# Patient Record
Sex: MALE | Race: WHITE | NOT HISPANIC OR LATINO | Employment: UNEMPLOYED | URBAN - METROPOLITAN AREA
[De-identification: names, ages, dates, MRNs, and addresses within clinical notes are randomized per-mention and may not be internally consistent; named-entity substitution may affect disease eponyms.]

---

## 2017-01-04 ENCOUNTER — OFFICE VISIT (OUTPATIENT)
Dept: BEHAVIORAL HEALTH | Facility: CLINIC | Age: 32
End: 2017-01-04
Payer: COMMERCIAL

## 2017-01-04 ENCOUNTER — OFFICE VISIT (OUTPATIENT)
Dept: FAMILY MEDICINE | Facility: CLINIC | Age: 32
End: 2017-01-04
Payer: COMMERCIAL

## 2017-01-04 VITALS
SYSTOLIC BLOOD PRESSURE: 138 MMHG | DIASTOLIC BLOOD PRESSURE: 94 MMHG | WEIGHT: 216 LBS | OXYGEN SATURATION: 100 % | TEMPERATURE: 98.5 F | HEART RATE: 78 BPM | BODY MASS INDEX: 26.86 KG/M2 | HEIGHT: 75 IN | RESPIRATION RATE: 20 BRPM

## 2017-01-04 DIAGNOSIS — F51.01 PRIMARY INSOMNIA: ICD-10-CM

## 2017-01-04 DIAGNOSIS — F43.22 ADJUSTMENT DISORDER WITH ANXIOUS MOOD: Primary | ICD-10-CM

## 2017-01-04 DIAGNOSIS — F11.20 UNCOMPLICATED OPIOID DEPENDENCE (H): ICD-10-CM

## 2017-01-04 DIAGNOSIS — K85.20 ALCOHOL-INDUCED ACUTE PANCREATITIS WITHOUT INFECTION OR NECROSIS: Primary | ICD-10-CM

## 2017-01-04 DIAGNOSIS — F33.1 MODERATE EPISODE OF RECURRENT MAJOR DEPRESSIVE DISORDER (H): ICD-10-CM

## 2017-01-04 PROCEDURE — 99215 OFFICE O/P EST HI 40 MIN: CPT | Performed by: FAMILY MEDICINE

## 2017-01-04 PROCEDURE — 90832 PSYTX W PT 30 MINUTES: CPT | Performed by: SOCIAL WORKER

## 2017-01-04 RX ORDER — BUPRENORPHINE AND NALOXONE 8; 2 MG/1; MG/1
2 FILM, SOLUBLE BUCCAL; SUBLINGUAL 2 TIMES DAILY
Qty: 84 FILM | Refills: 0 | Status: SHIPPED | OUTPATIENT
Start: 2017-01-04 | End: 2017-01-16

## 2017-01-04 RX ORDER — MIRTAZAPINE 7.5 MG/1
7.5 TABLET, FILM COATED ORAL AT BEDTIME
Qty: 30 TABLET | Refills: 3 | Status: SHIPPED | OUTPATIENT
Start: 2017-01-04 | End: 2017-08-05

## 2017-01-04 NOTE — PROGRESS NOTES
"  SUBJECTIVE:                                                    Frank Arguello is a 31 year old male who presents to clinic today for the following health issues:    Chronic Pain Follow-Up       Type / Location of Pain: pancreas  Analgesia/pain control:       Recent changes:  Same. Reports he has not had any drinks since the beer he had with his brother prior to last visit.       Overall control: poor  Activity level/function:      Daily activities:  Depends on \"how much I want to hurt,\" states it \"definitely flares up after doing things\"    Work:  Yes,states he is working when he can  Adverse effects:  day to day activity  Adherance    Taking medication as directed?  Yes    Participating in other treatments: had tried PT  Risk Factors:    Sleep:  Poor.      Mood/anxiety:  Worsened, states his anxiety is high. Pt states he has not had any panic attacks since last visit, but has experienced 5 or 6 panic attacks overall. Reports he has not noticed the metoprolol has helped with his anxiety. Pt states he avoids places and people to try to deal with his anxiety. Reports he tries to stay optimistic about the future.     Recent family or social stressors:  none noted    Other aggravating factors: physical activity  PHQ-9 SCORE 11/25/2016   Total Score 21   Some encounter information is confidential and restricted. Go to Review Flowsheets activity to see all data.     No flowsheet data found.  Encounter-Level CSA:     There are no encounter-level csa.             Amount of exercise or physical activity: depends on how patient wants to feel afterwards    Problems taking medications regularly: No    Medication side effects: none    Diet: eating small, healthy meals. Trying to eat 8 small meals a day.    Suboxone followup     Status since last visit:    Since last visit patient has been: struggling. Pt states nothing has improved with the increased dose. Reports same dull, annoying pain that radiates to his back that is " "always there. States activity wise he is able to do things, but experiences flares in pain when being active. Pt states he knows he can live with the chronic pain and is not expecting it to ever go away, however reports that it is affecting his daily life.   Intensity:     There has been: no craving.  N/A    Suboxone Dose: reports current dose is not effective for pain control. Reports he would like to get on a taper if the increased suboxone dose to 32 MG daily is still not helping his pain when pt returns for his next visit in 3 weeks.  Progression of Symptoms:     Cues to use and relapse None    Recovery program has been: active.  Pt states he is still talking to his sponsor and is trying to attend meetings. Pt reports he has sober friends that he can be around. States he is \"just trying to get into a routine\" to be able to deal with his pancreatitis and sobriety.   Accompanying Signs & Symptoms:    Side Effects: none.    Sobriety:   Status: no use since last visit.     Drug Screen: Not done, needs to be completed at next visit.  Precipitating factors:    Triggers have been: non-existent per pt  Alleviating factors:    Contact with sponsor has been: regular. Pt states he talks to a chemical dependency counselor as well.    Family and support system has been: neutral.   Other Therapies Tried :     Patient has been going to recovery meetings: sporadically ([facility] meetings).     Insomnia    Duration: Ongoing    Description (location/character/radiation): States pain is waking him up at night. States he is getting 2-4 hours of sleep a night. Reports the 100 MG seroquel is not helping his sleep very much. Pt reports he was on Remeron in the past, but did not find it helpful. Agrees to try Remeron and Seroquel together.    Intensity:  moderate    Accompanying signs and symptoms: None    History (similar episodes/previous evaluation): Pt has ongoing hx of insomnia associated with alcohol-induced pancreatitis and chronic " "pain    Precipitating or alleviating factors: Chronic pain wakes pt up as above    Therapies tried and outcome: Pt is currently on seroquel, but states it hasn't been effective. Agrees to try seroquel and remeron together    Depression Followup    Status since last visit: Stable. Pt states his depression goes up and down, but doesn't feel he is \"too depressed\". Pt reports he does have the energy and effort to have fun and be active. Pt states his dead brother's birthday was on [place]; reports his mother always takes it hard, but states overall it went fine. Reports recently there have been more days where he has felt down than normal. States sometimes he notices himself isolating more than usual.     See PHQ-9 for current symptoms.  Other associated symptoms: None    Complicating factors:   Significant life event:  Yes-   dead brother's birthday was on [place], as above   Current substance abuse:  None  Anxiety or Panic symptoms:  Yes-  As above    PHQ-9  English PHQ-9   Any Language        Problem list and histories reviewed & adjusted, as indicated.  Additional history: as documented    Patient Active Problem List   Diagnosis     Acute pancreatitis     Alcohol dependence (H)     Hepatic steatosis     Chronic abdominal pain     Opioid dependence s/p methadone treatment     Abnormal transaminases     Pancreatitis     Alcohol intoxication in active alcoholic (H)     Opioid use disorder, severe, dependence (H)     Opioid dependence with withdrawal (H)     Severe alcohol use disorder (H)     Drug abuse     Alcohol withdrawal (H)     Other secondary hypertension     Adjustment disorder with anxious mood     Past Surgical History   Procedure Laterality Date     Endoscopic retrograde cholangiopancreatogram  4/9/2014     Procedure: ENDOSCOPIC RETROGRADE CHOLANGIOPANCREATOGRAM;   Endoscopic Ultrasound with Fine needle aspiration, EsophagoGastroDuodenoscopy with stent placement into pseudeocyst, balloon dilation.  ;  " Surgeon: Jagjit Dow MD;  Location: [facility] OR     Esophagoscopy, gastroscopy, duodenoscopy (egd), combined  2014     Procedure: COMBINED ENDOSCOPIC ULTRASOUND, ESOPHAGOSCOPY, GASTROSCOPY, DUODENOSCOPY (EGD), FINE NEEDLE ASPIRATE/BIOPSY;;  Surgeon: Jagjit Dow MD;  Location: [facility] OR     Esophagoscopy, gastroscopy, duodenoscopy (egd), combined  2014     Procedure: COMBINED ESOPHAGOSCOPY, GASTROSCOPY, DUODENOSCOPY (EGD);  Surgeon: Jagjit Dow MD;  Location: [facility] OR       Social History   Substance Use Topics     Smoking status: Current Every Day Smoker -- 1.00 packs/day     Types: Cigarettes     Smokeless tobacco: Never Used     Alcohol Use: No      Comment: Used to but quit     Family History   Problem Relation Age of Onset     Alcohol/Drug Paternal Grandfather      Alcohol/Drug Father      Substance Abuse Father      Hypertension Mother      DIABETES No family hx of      CANCER No family hx of      Unknown/Adopted No family hx of      Depression No family hx of      Anxiety Disorder No family hx of      Schizophrenia No family hx of      Bipolar Disorder No family hx of      Suicide No family hx of      Dementia No family hx of      Keon Disease No family hx of      Parkinsonism No family hx of      Autism Spectrum Disorder No family hx of      Intellectual Disability (Mental Retardation) No family hx of      MENTAL ILLNESS No family hx of      Other - See Comments Brother       of heroin overdose     Substance Abuse Brother          Current Outpatient Prescriptions   Medication Sig Dispense Refill     buprenorphine HCl-naloxone HCl (SUBOXONE) 8-2 MG per film Place 1 Film under the tongue 3 times daily 42 Film 0     QUEtiapine (SEROQUEL) 50 MG tablet Take 1-2 tablets ( mg) by mouth nightly as needed 30 tablet 1     metoprolol (TOPROL-XL) 50 MG 24 hr tablet Take 1 tablet (50 mg) by mouth daily 90 tablet 3     gabapentin (NEURONTIN) 300 MG capsule Take 3 capsules (900 mg) by  "mouth 3 times daily 270 capsule 0     folic acid (FOLVITE) 1 MG tablet Take 1 tablet (1 mg) by mouth daily 30 tablet 11     busPIRone (BUSPAR) 15 MG tablet Take 15 mg by mouth 3 times daily       multivitamin, therapeutic with minerals (THERA-VIT-M) TABS Take 1 tablet by mouth daily 30 each 0     naloxone (NARCAN) 1 mg/mL for intranasal kit (2 syringes with 2 mucosal atomizer device) Give for opioid overdose.  Insert white cone into nostril and push plunger to spray one-half (1/2) of syringe contents into each nostril. 1 kit 1     Allergies   Allergen Reactions     Trazodone Other (See Comments)     erection     BP Readings from Last 3 Encounters:   01/04/17 154/99   12/21/16 133/86   12/06/16 148/98    Wt Readings from Last 3 Encounters:   01/04/17 97.977 kg (216 lb)   12/21/16 100.245 kg (221 lb)   12/06/16 99.111 kg (218 lb 8 oz)        Labs reviewed in EPIC  Problem list, Medication list, Allergies, and Medical/Social/Surgical histories reviewed in [facility] and updated as appropriate.    ROS:  Constitutional, HEENT, cardiovascular, pulmonary, , musculoskeletal, neuro, skin, and endocrine systems are negative, except as otherwise noted.  GI: Positive for alcohol-induced pancreatitis  Psych: As above.    This document serves as a record of the services and decisions personally performed and made by Amy Starkey MD. It was created on her behalf by Farhat Tejada, a trained medical scribe. The creation of this document is based the provider's statements to the medical scribe.    Arben Tejada 1:15 PM, January 4, 2017    OBJECTIVE:                                                    /99 mmHg  Pulse 78  Temp(Src) 98.5  F (36.9  C) (Oral)  Resp 20  Ht 1.905 m (6' 3\")  Wt 97.977 kg (216 lb)  BMI 27.00 kg/m2  SpO2 100%  Body mass index is 27 kg/(m^2).  GENERAL: Alert and no distress  PSYCH: MENTAL STATUS EXAM   Appearance: neat, well groomed, in casual clothing Attitude: cooperative Behavior: sitting " quietly in chair  Eye Contact: good  Speech: regular rate and volume  Orientation: to person, place, time  Mood: neutral  Affect: Mood congruent  Thought Process: logical, tight .  Pt wishes medical marijuana.  This provider chase check with Samantha KarenpeggyEranjinny to see if she is able to see pt.  He does not wish to be on suboxone long term and wants medical marijuana.  Insight: good  Judgement: good  Suicidal Ideation: none   Hallucination: None   Diagnostic Test Results:  none      ASSESSMENT/PLAN:                                                    (K85.20) Alcohol-induced acute pancreatitis without infection or necrosis  (primary encounter diagnosis)  Comment: Pt states 24 MG suboxone has been inadequate for pain management. Would like to be placed on taper if increased dose to 32 MG is not effective when pt returns to clinic in 3 weeks.  He really is interested in medical marijuana.  Plan: buprenorphine HCl-naloxone HCl (SUBOXONE) 8-2         MG per film        Will update pt on medical marijuana program status.     (F11.20) Uncomplicated opioid dependence (H)  Comment: As above  Plan: buprenorphine HCl-naloxone HCl (SUBOXONE) 8-2         MG per film            (F51.01) Primary insomnia  Comment: Pt states seroquel has not been helpful. Agrees to remeron and seroquel regimen after discussion  Plan: mirtazapine (REMERON) 7.5 MG TABS tablet          F33.1) Moderate major depression recurrent  Plan:  See instructions below.    Patient Instructions   Add mirtazapine (remeron) one at bedtime  Continue seroquel two at bedtime  Increase suboxone to 2 twice a day.  I will check with the medical marijuana provider  Schedule with the pain clinic.  I will coordinate with them.  Recheck in three weeks      The information in this document, created by the medical scribe for me, accurately reflects the services I personally performed and the decisions made by me. I have reviewed and approved this document for accuracy prior to  leaving the patient care area.  1:39 PM, 01/04/2017    Amy Starkey MD, MD  [facility]

## 2017-01-04 NOTE — PATIENT INSTRUCTIONS
Add mirtazapine (remeron) one at bedtime  Continue seroquel two at bedtime  Increase suboxone to 2 twice a day.  I will check with the medical marijuana provider  Schedule with the pain clinic.  I will coordinate with them.  Recheck in three weeks

## 2017-01-04 NOTE — MR AVS SNAPSHOT
After Visit Summary   1/4/2017    Frank Arguello    MRN: [id number]           Patient Information     Date Of Birth          1985        Visit Information        Provider Department      1/4/2017 1:00 PM Amy Starkey MD [facility]        Today's Diagnoses     Psychophysiological insomnia    -  1     Uncomplicated opioid dependence (H)         Alcohol-induced acute pancreatitis without infection or necrosis           Care Instructions    Add mirtazapine (remeron) one at bedtime  Continue seroquel two at bedtime  Increase suboxone to 2 twice a day.  I will check with the medical marijuana provider  Schedule with the pain clinic.  I will coordinate with them.  Recheck in three weeks        Follow-ups after your visit        Who to contact     If you have questions or need follow up information about today's clinic visit or your schedule please contact [facility] directly at [phone].  Normal or non-critical lab and imaging results will be communicated to you by MyChart, letter or phone within 4 business days after the clinic has received the results. If you do not hear from us within 7 days, please contact the clinic through [employer] or phone. If you have a critical or abnormal lab result, we will notify you by phone as soon as possible.  Submit refill requests through [employer] or call your pharmacy and they will forward the refill request to us. Please allow 3 business days for your refill to be completed.          Additional Information About Your Visit        MyChart Information     [employer] gives you secure access to your electronic health record. If you see a primary care provider, you can also send messages to your care team and make appointments. If you have questions, please call your primary care clinic.  If you do not have a primary care provider, please call [phone] and they will assist you.        Care EveryWhere  "ID     This is your Care EveryWhere ID. This could be used by other organizations to access your [facility] medical records  [id number]        Your Vitals Were     Pulse Temperature Respirations Height BMI (Body Mass Index) Pulse Oximetry    78 98.5  F (36.9  C) (Oral) 20 1.905 m (6' 3\") 27.00 kg/m2 100%       Blood Pressure from Last 3 Encounters:   01/04/17 154/99   12/21/16 133/86   12/06/16 148/98    Weight from Last 3 Encounters:   01/04/17 97.977 kg (216 lb)   12/21/16 100.245 kg (221 lb)   12/06/16 99.111 kg (218 lb 8 oz)              Today, you had the following     No orders found for display         Today's Medication Changes          These changes are accurate as of: 1/4/17  1:37 PM.  If you have any questions, ask your nurse or doctor.               Start taking these medicines.        Dose/Directions    mirtazapine 7.5 MG Tabs tablet   Commonly known as:  REMERON   Used for:  Psychophysiological insomnia   Started by:  Amy Starkey MD        Dose:  7.5 mg   Take 1 tablet (7.5 mg) by mouth At Bedtime   Quantity:  30 tablet   Refills:  3         These medicines have changed or have updated prescriptions.        Dose/Directions    buprenorphine HCl-naloxone HCl 8-2 MG per film   Commonly known as:  SUBOXONE   This may have changed:    - how much to take  - when to take this   Used for:  Uncomplicated opioid dependence (H), Alcohol-induced acute pancreatitis without infection or necrosis   Changed by:  Amy Starkey MD        Dose:  2 Film   Place 2 Film under the tongue 2 times daily for 21 days   Quantity:  84 Film   Refills:  0            Where to get your medicines      These medications were sent to [facility] [address] [place] [address] [address]     Phone:  [phone]    - mirtazapine 7.5 MG Tabs tablet      Some of these will need a paper prescription and others can be bought over the counter.  Ask your nurse if you have questions.     Bring " a paper prescription for each of these medications    - buprenorphine HCl-naloxone HCl 8-2 MG per film             Primary Care Provider Office Phone # Fax #    Amy Starkey [phone]       [facility] [address] [facility]  [facility] [address]        Thank you!     Thank you for choosing [facility]  for your care. Our goal is always to provide you with excellent care. Hearing back from our patients is one way we can continue to improve our services. Please take a few minutes to complete the written survey that you may receive in the mail after your visit with us. Thank you!             Your Updated Medication List - Protect others around you: Learn how to safely use, store and throw away your medicines at www.disposemymeds.org.          This list is accurate as of: 1/4/17  1:37 PM.  Always use your most recent med list.                   Brand Name Dispense Instructions for use    buprenorphine HCl-naloxone HCl 8-2 MG per film    SUBOXONE    84 Film    Place 2 Film under the tongue 2 times daily for 21 days       busPIRone 15 MG tablet    BUSPAR     Take 15 mg by mouth 3 times daily       folic acid 1 MG tablet    FOLVITE    30 tablet    Take 1 tablet (1 mg) by mouth daily       gabapentin 300 MG capsule    NEURONTIN    270 capsule    Take 3 capsules (900 mg) by mouth 3 times daily       metoprolol 50 MG 24 hr tablet    TOPROL-XL    90 tablet    Take 1 tablet (50 mg) by mouth daily       mirtazapine 7.5 MG Tabs tablet    REMERON    30 tablet    Take 1 tablet (7.5 mg) by mouth At Bedtime       multivitamin, therapeutic with minerals Tabs tablet     30 each    Take 1 tablet by mouth daily       naloxone 1 mg/mL for intranasal kit (2 syringes with 2 mucosal atomizer device)    NARCAN    1 kit    Give for opioid overdose.  Insert white cone into nostril and push plunger to spray one-half (1/2) of syringe contents into each nostril.        QUEtiapine 50 MG tablet    SEROQUEL    30 tablet    Take 1-2 tablets ( mg) by mouth nightly as needed

## 2017-01-04 NOTE — NURSING NOTE
"Chief Complaint   Patient presents with     Pain       Initial /99 mmHg  Pulse 78  Temp(Src) 98.5  F (36.9  C) (Oral)  Resp 20  Ht 6' 3\" (1.905 m)  Wt 216 lb (97.977 kg)  BMI 27.00 kg/m2  SpO2 100% Estimated body mass index is 27 kg/(m^2) as calculated from the following:    Height as of this encounter: 6' 3\" (1.905 m).    Weight as of this encounter: 216 lb (97.977 kg).  BP completed using cuff size: koby Morrell MA      "

## 2017-01-04 NOTE — NURSING NOTE
Rx was called into [facility] in [facility].    Lalitha Morrell MA

## 2017-01-04 NOTE — Clinical Note
"Luis Enrique Velazquez, I am referring Germain again.  I have reinforced with him the importance of a \"living with pain\" program that the pain clinic can provide.  How do we go about getting him scheduled at this point?"

## 2017-01-06 ENCOUNTER — COMMUNICATION - HEALTHEAST (OUTPATIENT)
Dept: FAMILY MEDICINE | Facility: CLINIC | Age: 32
End: 2017-01-06

## 2017-01-06 DIAGNOSIS — K86.0 ALCOHOL-INDUCED CHRONIC PANCREATITIS (H): ICD-10-CM

## 2017-01-06 NOTE — PROGRESS NOTES
[facility]   January 4, 2017    Behavioral Health Clinician Progress Note    Patient Name: Frank Arguello           Service Type: Individual      Service Location:   Face to Face in Clinic     Session Start Time: 1:14pm  Session End Time: 1:40pm    Session Length: 16 - 37      Attendees: Patient    Visit Activities (Refresh list every visit): [facility] [employer]    Diagnostic Assessment Date: not completed yet  Treatment Plan Review Date: not completed yet  See Flowsheets for today's PHQ-9 and ROCHELLE-7 results  Previous PHQ-9:   PHQ-9 SCORE 11/25/2016   Total Score 21   Some encounter information is confidential and restricted. Go to Review Flowsheets activity to see all data.     Previous ROCHELLE-7: No flowsheet data found.    STEVEN LEVEL:  No flowsheet data found.    DATA  Extended Session (60+ minutes): No  Interactive Complexity: No  Crisis: No    Treatment Objective(s) Addressed in This Session:  Target Behavior(s): disease management/lifestyle changes mental health    Depressed Mood: Increase interest, engagement, and pleasure in doing things  Decrease frequency and intensity of feeling down, depressed, hopeless  Improve quantity and quality of night time sleep / decrease daytime naps  Identify negative self-talk and behaviors: challenge core beliefs, myths, and actions  Improve concentration, focus, and mindfulness in daily activities   Anxiety: will experience a reduction in anxiety, will develop more effective coping skills to manage anxiety symptoms, will develop healthy cognitive patterns and beliefs and will increase ability to function adaptively  Alcohol / Substance Use: continue to make healthy choices regarding substance use and engage in activities / supportive services that promote sobriety  Psychological distress related to Pain    Current Stressors / Issues:  The patient reported that he has been sober since his last use (relapse) when he was drinking with his brother-he has history  of opiate dependence and continues to be sober of use.  He has history of chronic pain in the abdominal area and he expressed interest with the use of medical marijuana to address his pain with his PCP.  Regarding sleep the patient reported having difficulty falling asleep and that he has been waking during the night-he gets about 2 to 4 hours of deep sleep a night-he reported his appetite is okay.  He reported that he has a CD counselor and that he would let the clinic know if he needs more support.  Regarding his mood the patient reported that he has symptoms of depression-he isolate mare that he should-he avoids people, places and things to manage his anxiety-he reported that he has panic attacks-he worries a lot and he has difficulty controlling worrying.  The patient reported that he has sober friends.        Progress on Treatment Objective(s) / Homework:  No improvement - ACTION (Actively working towards change); Intervened by reinforcing change plan / affirming steps taken    Motivational Interviewing    MI Intervention: Expressed Empathy/Understanding, Supported Autonomy, Collaboration, Evocation, Permission to raise concern or advise, Open-ended questions, Reflections: simple and complex and Change talk (evoked)     Change Talk Expressed by the Patient: Desire to change Reasons to change Need to change Committment to change Taking steps    Provider Response to Change Talk: E - Evoked more info from patient about behavior change, A - Affirmed patient's thoughts, decisions, or attempts at behavior change, R - Reflected patient's change talk and S - Summarized patient's change talk statements      Care Plan review completed: No    Medication Review:  No changes to current psychiatric medication(s)    Medication Compliance:  NA    Changes in Health Issues:   Yes: Pain, Associated Psychological Distress    Chemical Use Review:   Substance Use: Chemical use reviewed, no active concerns identified      Tobacco  Use: No change in amount of tobacco use since last session.  Patient declined discussion at this time    Assessment: Current Emotional / Mental Status (status of significant symptoms):  Risk status (Self / Other harm or suicidal ideation)  Patient denies a history of suicidal ideation, suicide attempts, self-injurious behavior, homicidal ideation, homicidal behavior and and other safety concerns  Patient denies current fears or concerns for personal safety.  Patient denies current or recent suicidal ideation or behaviors.  Patient denies current or recent homicidal ideation or behaviors.  Patient denies current or recent self injurious behavior or ideation.  Patient denies other safety concerns.  A safety and risk management plan has not been developed at this time, however patient was encouraged to call [place] [id number] should there be a change in any of these risk factors.    Appearance:   Appropriate   Eye Contact:   Good   Psychomotor Behavior: Normal   Attitude:   Cooperative   Orientation:   All  Speech   Rate / Production: Normal    Volume:  Normal   Mood:    Anxious  Normal  Affect:    Appropriate   Thought Content:  Clear   Thought Form:  Coherent  Goal Directed  Logical   Insight:    Good     Diagnoses:  1. Adjustment disorder with anxious mood        Collateral Reports Completed:  Not Applicable    Plan: (Homework, other):  Patient was given information about behavioral services and encouraged to schedule a follow up appointment with the clinic [facility] as needed.  He was also given information about mental health symptoms and treatment options .  CD Recommendations: Maintain Sobriety.    SARANYA Romero, [facility]      ______________________________________________________________________

## 2017-01-09 ENCOUNTER — COMMUNICATION - HEALTHEAST (OUTPATIENT)
Dept: FAMILY MEDICINE | Facility: CLINIC | Age: 32
End: 2017-01-09

## 2017-01-09 DIAGNOSIS — F10.930 ALCOHOL WITHDRAWAL, UNCOMPLICATED (H): ICD-10-CM

## 2017-01-09 RX ORDER — QUETIAPINE FUMARATE 50 MG/1
50-100 TABLET, FILM COATED ORAL
Qty: 30 TABLET | Refills: 1 | Status: CANCELLED | OUTPATIENT
Start: 2017-01-09

## 2017-01-09 NOTE — TELEPHONE ENCOUNTER
Fax received 01/07/17    QUEtiapine (SEROQUEL) 50 MG     Last Written Prescription Date: 12/06/16  Last Fill Quantity: 30, # refills: 1  Last Office Visit with G, P or [facility] prescribing provider: 01/04/17   Next 5 appointments (look out 90 days)     Jan 25, 2017  2:30 PM   Return Visit with Amy Starkey MD   [facility] ([facility])    [address]   [address]  [facility] [address] [phone]                   BP Readings from Last 3 Encounters:   01/04/17 138/94   12/21/16 133/86   12/06/16 148/98     Pulse Readings from Last 2 Encounters:   01/04/17 78   12/21/16 103     GLC      119   11/9/2016  WBC      8.6   11/9/2016  RBC     4.24   11/9/2016  HGB     12.7   11/9/2016  HCT     38.3   11/9/2016  No components found with this name: mct  MCV       90   11/9/2016  MCH     30.0   11/9/2016  MCHC     33.2   11/9/2016  RDW     13.4   11/9/2016  PLT      189   11/9/2016  CHOL      121   5/30/2013  HDL       46   5/30/2013  LDL       63   5/30/2013  TRIG       63   5/30/2013  CHOLHDLRATIO      2.6   5/30/2013

## 2017-01-09 NOTE — TELEPHONE ENCOUNTER
Routing refill request to provider for review/approval because:  No Associated Dx.          Herbert Montelongo RN

## 2017-01-25 ENCOUNTER — OFFICE VISIT (OUTPATIENT)
Dept: FAMILY MEDICINE | Facility: CLINIC | Age: 32
End: 2017-01-25
Payer: COMMERCIAL

## 2017-01-25 VITALS
OXYGEN SATURATION: 100 % | WEIGHT: 213 LBS | RESPIRATION RATE: 12 BRPM | DIASTOLIC BLOOD PRESSURE: 98 MMHG | SYSTOLIC BLOOD PRESSURE: 134 MMHG | TEMPERATURE: 98.3 F | HEART RATE: 111 BPM | BODY MASS INDEX: 26.62 KG/M2

## 2017-01-25 DIAGNOSIS — F41.9 ANXIETY: ICD-10-CM

## 2017-01-25 DIAGNOSIS — F51.01 PRIMARY INSOMNIA: ICD-10-CM

## 2017-01-25 DIAGNOSIS — K85.20 ALCOHOL-INDUCED ACUTE PANCREATITIS WITHOUT INFECTION OR NECROSIS: ICD-10-CM

## 2017-01-25 DIAGNOSIS — R10.9 CHRONIC ABDOMINAL PAIN: ICD-10-CM

## 2017-01-25 DIAGNOSIS — F11.20 UNCOMPLICATED OPIOID DEPENDENCE (H): Primary | ICD-10-CM

## 2017-01-25 DIAGNOSIS — G89.29 CHRONIC ABDOMINAL PAIN: ICD-10-CM

## 2017-01-25 DIAGNOSIS — F19.20 CHEMICAL DEPENDENCY (H): ICD-10-CM

## 2017-01-25 LAB
AMPHETAMINES UR QL: NORMAL
BARBITURATES UR QL: NORMAL
BENZODIAZ UR QL: NORMAL
BUPRENORPHINE UR QL: POSITIVE
CANNABINOIDS UR QL: NORMAL
COCAINE UR QL: NORMAL
MDA UR QL SCN: NORMAL
METHADONE UR QL SCN: NORMAL
METHAMPHET UR QL SCN: NORMAL
OPIATES UR QL SCN: NORMAL
OXYCODONE UR QL: NORMAL
PCP UR QL SCN: NORMAL
TRICYCLICS UR QL SCN: NORMAL

## 2017-01-25 PROCEDURE — 80306 DRUG TEST PRSMV INSTRMNT: CPT | Performed by: FAMILY MEDICINE

## 2017-01-25 PROCEDURE — 99215 OFFICE O/P EST HI 40 MIN: CPT | Performed by: FAMILY MEDICINE

## 2017-01-25 PROCEDURE — 80305 DRUG TEST PRSMV DIR OPT OBS: CPT | Performed by: FAMILY MEDICINE

## 2017-01-25 RX ORDER — QUETIAPINE FUMARATE 50 MG/1
50-100 TABLET, FILM COATED ORAL
Qty: 30 TABLET | Refills: 1 | Status: SHIPPED | OUTPATIENT
Start: 2017-01-25 | End: 2017-03-05

## 2017-01-25 RX ORDER — GABAPENTIN 300 MG/1
900 CAPSULE ORAL 3 TIMES DAILY
Qty: 270 CAPSULE | Refills: 0 | Status: SHIPPED | OUTPATIENT
Start: 2017-01-25 | End: 2017-07-09

## 2017-01-25 RX ORDER — BUPRENORPHINE AND NALOXONE 8; 2 MG/1; MG/1
FILM, SOLUBLE BUCCAL; SUBLINGUAL
Qty: 75 FILM | Refills: 0 | Status: SHIPPED | OUTPATIENT
Start: 2017-01-25 | End: 2017-02-20

## 2017-01-25 NOTE — Clinical Note
Caroline, I think he is ready to engage in full scale program.  We have discussed it a lot.  Can he just reschedule?

## 2017-01-25 NOTE — MR AVS SNAPSHOT
After Visit Summary   1/25/2017    Frank Arguello    MRN: [id number]           Patient Information     Date Of Birth          1985        Visit Information        Provider Department      1/25/2017 2:30 PM Amy Starkey MD [facility]        Today's Diagnoses     Uncomplicated opioid dependence (H)    -  1     Alcohol-induced acute pancreatitis without infection or necrosis         Chronic abdominal pain         Anxiety         Primary insomnia            Follow-ups after your visit        Who to contact     If you have questions or need follow up information about today's clinic visit or your schedule please contact [facility] directly at [phone].  Normal or non-critical lab and imaging results will be communicated to you by MyChart, letter or phone within 4 business days after the clinic has received the results. If you do not hear from us within 7 days, please contact the clinic through [employer] or phone. If you have a critical or abnormal lab result, we will notify you by phone as soon as possible.  Submit refill requests through [employer] or call your pharmacy and they will forward the refill request to us. Please allow 3 business days for your refill to be completed.          Additional Information About Your Visit        MyChart Information     [employer] gives you secure access to your electronic health record. If you see a primary care provider, you can also send messages to your care team and make appointments. If you have questions, please call your primary care clinic.  If you do not have a primary care provider, please call [phone] and they will assist you.        Care EveryWhere ID     This is your Care EveryWhere ID. This could be used by other organizations to access your [facility] medical records  [id number]        Your Vitals Were     Pulse Temperature Respirations Pulse Oximetry          111 98.3  F (36.8   C) (Oral) 12 100%         Blood Pressure from Last 3 Encounters:   01/25/17 134/98   01/04/17 138/94   12/21/16 133/86    Weight from Last 3 Encounters:   01/25/17 96.616 kg (213 lb)   01/04/17 97.977 kg (216 lb)   12/21/16 100.245 kg (221 lb)              Today, you had the following     No orders found for display         Today's Medication Changes          These changes are accurate as of: 1/25/17  3:12 PM.  If you have any questions, ask your nurse or doctor.               These medicines have changed or have updated prescriptions.        Dose/Directions    buprenorphine HCl-naloxone HCl 8-2 MG per film   Commonly known as:  SUBOXONE   This may have changed:    - how much to take  - how to take this  - when to take this  - additional instructions   Used for:  Uncomplicated opioid dependence (H), Alcohol-induced acute pancreatitis without infection or necrosis   Changed by:  Amy Starkey MD        One in the morning, one in the afternoon, and 1/2 at bedtime   Quantity:  75 Film   Refills:  0            Where to get your medicines      These medications were sent to [place] [address] [place] [address]     Phone:  [phone]    - gabapentin 300 MG capsule  - QUEtiapine 50 MG tablet      Some of these will need a paper prescription and others can be bought over the counter.  Ask your nurse if you have questions.     Bring a paper prescription for each of these medications    - buprenorphine HCl-naloxone HCl 8-2 MG per film             Primary Care Provider Office Phone # Fax #    Amy Starkey [phone]       [facility] [address] [place] [address] [address] [place] [id number]        Thank you!     Thank you for choosing [facility]  for your care. Our goal is always to provide you with excellent care. Hearing back from our patients is one way we can continue to improve our services. Please take  a few minutes to complete the written survey that you may receive in the mail after your visit with us. Thank you!             Your Updated Medication List - Protect others around you: Learn how to safely use, store and throw away your medicines at www.disposemymeds.org.          This list is accurate as of: 1/25/17  3:12 PM.  Always use your most recent med list.                   Brand Name Dispense Instructions for use    buprenorphine HCl-naloxone HCl 8-2 MG per film    SUBOXONE    75 Film    One in the morning, one in the afternoon, and 1/2 at bedtime       busPIRone 15 MG tablet    BUSPAR     Take 15 mg by mouth 3 times daily       folic acid 1 MG tablet    FOLVITE    30 tablet    Take 1 tablet (1 mg) by mouth daily       gabapentin 300 MG capsule    NEURONTIN    270 capsule    Take 3 capsules (900 mg) by mouth 3 times daily       metoprolol 50 MG 24 hr tablet    TOPROL-XL    90 tablet    Take 1 tablet (50 mg) by mouth daily       mirtazapine 7.5 MG Tabs tablet    REMERON    30 tablet    Take 1 tablet (7.5 mg) by mouth At Bedtime       multivitamin, therapeutic with minerals Tabs tablet     30 each    Take 1 tablet by mouth daily       naloxone 1 mg/mL for intranasal kit (2 syringes with 2 mucosal atomizer device)    NARCAN    1 kit    Give for opioid overdose.  Insert white cone into nostril and push plunger to spray one-half (1/2) of syringe contents into each nostril.       omeprazole 20 MG CR capsule    priLOSEC         QUEtiapine 50 MG tablet    SEROQUEL    30 tablet    Take 1-2 tablets ( mg) by mouth nightly as needed

## 2017-01-25 NOTE — PROGRESS NOTES
"  SUBJECTIVE:                                                    Frank Arguello is a 31 year old male who presents to clinic today for the following health issues:    Chronic Pain Follow-Up       Type / Location of Pain: pancreas  Analgesia/pain control:        Recent changes:  Same. Pt reports his insurance no longer covers his gabapentin prescription. Discussed medical marijuana with pt.  Unable to find a certifier within [place], discussed with pt to check with state site.      Overall control: poor  Activity level/function:       Daily activities:  Depends on \"how much I want to hurt,\" states it \"definitely flares up after doing things\"    Work:  Yes, states he is working when he can  Adverse effects:  Constantly and radiates to his back  Adherance    Taking medication as directed?  Yes    Participating in other treatments: had tried PT  Risk Factors:    Sleep:  Poor. Pt states he is taking seroquel and remeron at bed time. Reports he goes to bed 10-11 PM and sometimes is able to fall asleep within 30 minutes, and other times \"tosses and turns\" for 4 hours. Reports he wakes up several times at night and doesn't feel like he is getting any sleep. States he gets anywhere from 45 minutes- 3 hours of sleep at a stretch before awakening, may or may not go back to sleep. Pt takes 100 MG seroquel. States it makes him groggy but doesn't put him to sleep. Reports he tried stopping the seroquel, but states he wasn't able to sleep at all. States he usually wakes up around 7-8 AM. States his amount of sleep isn't related to his activity during the day. He has been making an effort to avoid using a computer or a phone at night to avoid visual stimulation.     Mood/anxiety:  Worsened, states his anxiety is high and \"through the roof.\" Pt states he has been experiencing panic attacks.  Trying deep breathing and other things but doesn't seem to be working. He has not taken his seroquel when he experiences panic attacks. Is " "taking buspar 3 times a day.     Recent family or social stressors:  not really, however his grandma seems to be on her way \"out\"    Other aggravating factors: physical activity        Suboxone followup     Status since last visit:    Since last visit patient has been: struggling. Pt states his insurance denied the higher dose of 32 MG of suboxone. Reports he was experiencing some withdrawal symptoms for 3 days after taking the higher dose then moving back down to the lower 24 MG dose. Pt is requesting tapering suboxone to eventually be completely off of it. Overall pt reports he does not feel a benefit in regards to pain management on the 24 MG.He feels suboxone is another dependency and he would prefer to try another method of pain control.  Pt will look into medical marijuana. Discussed taper schedule with pt.   Pt states he  is interested in going to the pain clinic.   Intensity:     There has been: no craving.      Suboxone Dose: too little.  Pt states insurance denied higher dose of 32 MG.  Progression of Symptoms:     Cues to use and relapse None  Accompanying Signs & Symptoms:    Side Effects: none.    Sobriety:     Status: no use since last visit.    Drug Screen: obtained, no unexpected substances  Precipitating factors:    Triggers have been: non-existent.   Alleviating factors:    Contact with sponsor has been: no sponsor.     Family and support system has been: neutral.   Other Therapies Tried :     Patient has been going to recovery meetings: not at all.      Problem list and histories reviewed & adjusted, as indicated.  Additional history: as documented    Patient Active Problem List   Diagnosis     Acute pancreatitis     Alcohol dependence (H)     Hepatic steatosis     Chronic abdominal pain     Opioid dependence s/p methadone treatment     Abnormal transaminases     Pancreatitis     Alcohol intoxication in active alcoholic (H)     Opioid use disorder, severe, dependence (H)     Opioid dependence with " withdrawal (H)     Severe alcohol use disorder (H)     Drug abuse     Alcohol withdrawal (H)     Other secondary hypertension     Adjustment disorder with anxious mood     Past Surgical History   Procedure Laterality Date     Endoscopic retrograde cholangiopancreatogram  2014     Procedure: ENDOSCOPIC RETROGRADE CHOLANGIOPANCREATOGRAM;   Endoscopic Ultrasound with Fine needle aspiration, EsophagoGastroDuodenoscopy with stent placement into pseudeocyst, balloon dilation.  ;  Surgeon: Jagjit Dow MD;  Location: [facility] OR     Esophagoscopy, gastroscopy, duodenoscopy (egd), combined  2014     Procedure: COMBINED ENDOSCOPIC ULTRASOUND, ESOPHAGOSCOPY, GASTROSCOPY, DUODENOSCOPY (EGD), FINE NEEDLE ASPIRATE/BIOPSY;;  Surgeon: Jagjit Dow MD;  Location: [facility] OR     Esophagoscopy, gastroscopy, duodenoscopy (egd), combined  2014     Procedure: COMBINED ESOPHAGOSCOPY, GASTROSCOPY, DUODENOSCOPY (EGD);  Surgeon: Jagjit Dow MD;  Location: [facility] OR       Social History   Substance Use Topics     Smoking status: Current Every Day Smoker -- 1.00 packs/day     Types: Cigarettes     Smokeless tobacco: Never Used     Alcohol Use: No      Comment: Used to but quit     Family History   Problem Relation Age of Onset     Alcohol/Drug Paternal Grandfather      Alcohol/Drug Father      Substance Abuse Father      Hypertension Mother      DIABETES No family hx of      CANCER No family hx of      Unknown/Adopted No family hx of      Depression No family hx of      Anxiety Disorder No family hx of      Schizophrenia No family hx of      Bipolar Disorder No family hx of      Suicide No family hx of      Dementia No family hx of      Keon Disease No family hx of      Parkinsonism No family hx of      Autism Spectrum Disorder No family hx of      Intellectual Disability (Mental Retardation) No family hx of      MENTAL ILLNESS No family hx of      Other - See Comments Brother       of heroin overdose      Substance Abuse Brother          Current Outpatient Prescriptions   Medication Sig Dispense Refill     omeprazole (PRILOSEC) 20 MG CR capsule        buprenorphine HCl-naloxone HCl (SUBOXONE) 8-2 MG per film One in the morning, one in the afternoon, and 1/2 at bedtime 75 Film 0     mirtazapine (REMERON) 7.5 MG TABS tablet Take 1 tablet (7.5 mg) by mouth At Bedtime 30 tablet 3     QUEtiapine (SEROQUEL) 50 MG tablet Take 1-2 tablets ( mg) by mouth nightly as needed 30 tablet 1     metoprolol (TOPROL-XL) 50 MG 24 hr tablet Take 1 tablet (50 mg) by mouth daily 90 tablet 3     gabapentin (NEURONTIN) 300 MG capsule Take 3 capsules (900 mg) by mouth 3 times daily 270 capsule 0     folic acid (FOLVITE) 1 MG tablet Take 1 tablet (1 mg) by mouth daily 30 tablet 11     busPIRone (BUSPAR) 15 MG tablet Take 15 mg by mouth 3 times daily       multivitamin, therapeutic with minerals (THERA-VIT-M) TABS Take 1 tablet by mouth daily 30 each 0     naloxone (NARCAN) 1 mg/mL for intranasal kit (2 syringes with 2 mucosal atomizer device) Give for opioid overdose.  Insert white cone into nostril and push plunger to spray one-half (1/2) of syringe contents into each nostril. 1 kit 1     Allergies   Allergen Reactions     Trazodone Other (See Comments)     erection     BP Readings from Last 3 Encounters:   01/25/17 134/98   01/04/17 138/94   12/21/16 133/86    Wt Readings from Last 3 Encounters:   01/25/17 96.616 kg (213 lb)   01/04/17 97.977 kg (216 lb)   12/21/16 100.245 kg (221 lb)        Labs reviewed in EPIC  Problem list, Medication list, Allergies, and Medical/Social/Surgical histories reviewed in [facility] and updated as appropriate.    ROS:  Constitutional, HEENT, cardiovascular, pulmonary, , musculoskeletal, neuro, skin, endocrine and psych systems are negative, except as otherwise noted.  GI: Positive for pancreatitis    This document serves as a record of the services and decisions personally performed and made by Amy  MD Lalito. It was created on her behalf by Farhat Tejada, a trained medical scribe. The creation of this document is based the provider's statements to the medical scribe.    Arben Tejada 3:04 PM, January 25, 2017    OBJECTIVE:                                                    /98 mmHg  Pulse 111  Temp(Src) 98.3  F (36.8  C) (Oral)  Resp 12  Wt 96.616 kg (213 lb)  SpO2 100%  Body mass index is 26.62 kg/(m^2).  GENERAL: Alert and no distress  PSYCH: MENTAL STATUS EXAM   Appearance: neat, well groomed Attitude: cooperative Behavior: sitting quietly in chair  Eye Contact: good  Speech: regular rate and volume  Orientation: to person, place, time  Mood: neutral  Affect: Mood congruent  Thought Process: logical, tight associations  Insight: good  Judgement: good  Suicidal Ideation: none   Hallucination: None   Diagnostic Test Results:  none      ASSESSMENT/PLAN:                                                    (F11.20) Uncomplicated opioid dependence (H)  (primary encounter diagnosis)  Comment: pt wishes to be off of suboxone. Will begin to taper.  Discussed taper schedule with pt and is in agreement  Plan: buprenorphine HCl-naloxone HCl (SUBOXONE) 8-2         MG per film, one film two times a day, 1/2 film once a day.            (K85.20) Alcohol-induced acute pancreatitis without infection or necrosis  Comment:  Pt states he does not feel that suboxone at 24 MG is effective at managing his pain and would rather be tapered off of it and try other means of pain control  Plan: buprenorphine HCl-naloxone HCl (SUBOXONE) 8-2         MG per film            (R10.9,  G89.29) Chronic abdominal pain  Comment: Pt states his insurance no longer covers his gabapentin. Discussed prior authorization and pain clinic option with pt, who states is interested in going to the pain clinic.  Plan: gabapentin (NEURONTIN) 300 MG capsule, refill, will send prior auth if necessary.            (F41.9) Anxiety  Comment: Pt  states he is still experiencing panic attacks and his anxiety level is high. Reports he is taking buspar 3 times daily. Discussed use of seroquel after panic attack to help with symptoms  Plan: Take seroquel for a panic attack in order to manage symptoms and continue buspar daily.      (F51.01) Primary insomnia  Comment: Pt feels part of difficulty sleeping is pain which awakens him.  He is taking seroquel and remeron which help.  Plan: refill QUEtiapine (SEROQUEL) 50 MG tablet  Discussed followup with pain clinic.              The information in this document, created by the medical scribe for me, accurately reflects the services I personally performed and the decisions made by me. I have reviewed and approved this document for accuracy prior to leaving the patient care area.  3:14 PM, 01/25/2017    Amy Starkey MD, MD  [facility]

## 2017-02-14 PROBLEM — F41.9 ANXIETY: Status: ACTIVE | Noted: 2017-02-14

## 2017-02-20 ENCOUNTER — APPOINTMENT (OUTPATIENT)
Dept: CT IMAGING | Facility: CLINIC | Age: 32
DRG: 439 | End: 2017-02-20
Attending: EMERGENCY MEDICINE
Payer: COMMERCIAL

## 2017-02-20 ENCOUNTER — HOSPITAL ENCOUNTER (INPATIENT)
Facility: CLINIC | Age: 32
LOS: 2 days | Discharge: LEFT BEFORE DISCHARGE | DRG: 439 | End: 2017-02-22
Attending: EMERGENCY MEDICINE | Admitting: INTERNAL MEDICINE
Payer: COMMERCIAL

## 2017-02-20 LAB
ALBUMIN SERPL-MCNC: 3.8 G/DL (ref 3.4–5)
ALBUMIN SERPL-MCNC: 4.3 G/DL (ref 3.4–5)
ALBUMIN UR-MCNC: NEGATIVE MG/DL
ALP SERPL-CCNC: 71 U/L (ref 40–150)
ALP SERPL-CCNC: 77 U/L (ref 40–150)
ALT SERPL W P-5'-P-CCNC: 29 U/L (ref 0–70)
ALT SERPL W P-5'-P-CCNC: 35 U/L (ref 0–70)
AMPHETAMINES UR QL SCN: ABNORMAL
ANION GAP SERPL CALCULATED.3IONS-SCNC: 21 MMOL/L (ref 3–14)
ANION GAP SERPL CALCULATED.3IONS-SCNC: 8 MMOL/L (ref 3–14)
APPEARANCE UR: CLEAR
AST SERPL W P-5'-P-CCNC: 18 U/L (ref 0–45)
AST SERPL W P-5'-P-CCNC: 31 U/L (ref 0–45)
BARBITURATES UR QL: ABNORMAL
BASOPHILS # BLD AUTO: 0.1 10E9/L (ref 0–0.2)
BASOPHILS NFR BLD AUTO: 0.2 %
BENZODIAZ UR QL: ABNORMAL
BILIRUB SERPL-MCNC: 0.7 MG/DL (ref 0.2–1.3)
BILIRUB SERPL-MCNC: 1 MG/DL (ref 0.2–1.3)
BILIRUB UR QL STRIP: NEGATIVE
BUN SERPL-MCNC: 11 MG/DL (ref 7–30)
BUN SERPL-MCNC: 17 MG/DL (ref 7–30)
CALCIUM SERPL-MCNC: 8 MG/DL (ref 8.5–10.1)
CALCIUM SERPL-MCNC: 8.6 MG/DL (ref 8.5–10.1)
CANNABINOIDS UR QL SCN: ABNORMAL
CHLORIDE SERPL-SCNC: 102 MMOL/L (ref 94–109)
CHLORIDE SERPL-SCNC: 99 MMOL/L (ref 94–109)
CO2 BLDCOV-SCNC: 22 MMOL/L (ref 21–28)
CO2 SERPL-SCNC: 16 MMOL/L (ref 20–32)
CO2 SERPL-SCNC: 26 MMOL/L (ref 20–32)
COCAINE UR QL: ABNORMAL
COLOR UR AUTO: YELLOW
CREAT SERPL-MCNC: 0.89 MG/DL (ref 0.66–1.25)
CREAT SERPL-MCNC: 0.91 MG/DL (ref 0.66–1.25)
DIFFERENTIAL METHOD BLD: ABNORMAL
EOSINOPHIL # BLD AUTO: 0 10E9/L (ref 0–0.7)
EOSINOPHIL NFR BLD AUTO: 0.2 %
ERYTHROCYTE [DISTWIDTH] IN BLOOD BY AUTOMATED COUNT: 13.2 % (ref 10–15)
ERYTHROCYTE [DISTWIDTH] IN BLOOD BY AUTOMATED COUNT: 13.2 % (ref 10–15)
ETHANOL SERPL-MCNC: 0.03 G/DL
ETHANOL UR QL SCN: ABNORMAL
GFR SERPL CREATININE-BSD FRML MDRD: ABNORMAL ML/MIN/1.7M2
GFR SERPL CREATININE-BSD FRML MDRD: ABNORMAL ML/MIN/1.7M2
GLUCOSE SERPL-MCNC: 101 MG/DL (ref 70–99)
GLUCOSE SERPL-MCNC: 70 MG/DL (ref 70–99)
GLUCOSE UR STRIP-MCNC: NEGATIVE MG/DL
HCT VFR BLD AUTO: 39.7 % (ref 40–53)
HCT VFR BLD AUTO: 45.7 % (ref 40–53)
HGB BLD-MCNC: 13.7 G/DL (ref 13.3–17.7)
HGB BLD-MCNC: 16.1 G/DL (ref 13.3–17.7)
HGB UR QL STRIP: NEGATIVE
HYALINE CASTS #/AREA URNS LPF: 4 /LPF (ref 0–2)
IMM GRANULOCYTES # BLD: 0.1 10E9/L (ref 0–0.4)
IMM GRANULOCYTES NFR BLD: 0.5 %
INR PPP: 1.07 (ref 0.86–1.14)
KETONES UR STRIP-MCNC: 40 MG/DL
LACTATE BLD-SCNC: 2.5 MMOL/L (ref 0.7–2.1)
LACTATE BLD-SCNC: 5.9 MMOL/L (ref 0.7–2.1)
LACTATE SERPL-SCNC: 1.2 MMOL/L (ref 0.4–2)
LEUKOCYTE ESTERASE UR QL STRIP: NEGATIVE
LIPASE SERPL-CCNC: 660 U/L (ref 73–393)
LYMPHOCYTES # BLD AUTO: 4.4 10E9/L (ref 0.8–5.3)
LYMPHOCYTES NFR BLD AUTO: 18.9 %
MCH RBC QN AUTO: 29.8 PG (ref 26.5–33)
MCH RBC QN AUTO: 31 PG (ref 26.5–33)
MCHC RBC AUTO-ENTMCNC: 34.5 G/DL (ref 31.5–36.5)
MCHC RBC AUTO-ENTMCNC: 35.2 G/DL (ref 31.5–36.5)
MCV RBC AUTO: 86 FL (ref 78–100)
MCV RBC AUTO: 88 FL (ref 78–100)
MONOCYTES # BLD AUTO: 1.8 10E9/L (ref 0–1.3)
MONOCYTES NFR BLD AUTO: 7.6 %
MUCOUS THREADS #/AREA URNS LPF: PRESENT /LPF
NEUTROPHILS # BLD AUTO: 16.8 10E9/L (ref 1.6–8.3)
NEUTROPHILS NFR BLD AUTO: 72.6 %
NITRATE UR QL: NEGATIVE
NRBC # BLD AUTO: 0 10*3/UL
NRBC BLD AUTO-RTO: 0 /100
OPIATES UR QL SCN: ABNORMAL
PCO2 BLDV: 42 MM HG (ref 40–50)
PH BLDV: 7.33 PH (ref 7.32–7.43)
PH UR STRIP: 5.5 PH (ref 5–7)
PLATELET # BLD AUTO: 147 10E9/L (ref 150–450)
PLATELET # BLD AUTO: 234 10E9/L (ref 150–450)
PO2 BLDV: 35 MM HG (ref 25–47)
POTASSIUM SERPL-SCNC: 3.5 MMOL/L (ref 3.4–5.3)
POTASSIUM SERPL-SCNC: 3.7 MMOL/L (ref 3.4–5.3)
PROT SERPL-MCNC: 7.2 G/DL (ref 6.8–8.8)
PROT SERPL-MCNC: 8.3 G/DL (ref 6.8–8.8)
RBC # BLD AUTO: 4.6 10E12/L (ref 4.4–5.9)
RBC # BLD AUTO: 5.2 10E12/L (ref 4.4–5.9)
RBC #/AREA URNS AUTO: <1 /HPF (ref 0–2)
SAO2 % BLDV FROM PO2: 62 %
SODIUM SERPL-SCNC: 134 MMOL/L (ref 133–144)
SODIUM SERPL-SCNC: 136 MMOL/L (ref 133–144)
SP GR UR STRIP: 1.04 (ref 1–1.03)
SQUAMOUS #/AREA URNS AUTO: <1 /HPF (ref 0–1)
URN SPEC COLLECT METH UR: ABNORMAL
UROBILINOGEN UR STRIP-MCNC: NORMAL MG/DL (ref 0–2)
WBC # BLD AUTO: 14.1 10E9/L (ref 4–11)
WBC # BLD AUTO: 23.2 10E9/L (ref 4–11)
WBC #/AREA URNS AUTO: 1 /HPF (ref 0–2)

## 2017-02-20 PROCEDURE — 81001 URINALYSIS AUTO W/SCOPE: CPT | Performed by: EMERGENCY MEDICINE

## 2017-02-20 PROCEDURE — 83605 ASSAY OF LACTIC ACID: CPT | Performed by: PHYSICIAN ASSISTANT

## 2017-02-20 PROCEDURE — 82803 BLOOD GASES ANY COMBINATION: CPT

## 2017-02-20 PROCEDURE — 85027 COMPLETE CBC AUTOMATED: CPT | Performed by: PHYSICIAN ASSISTANT

## 2017-02-20 PROCEDURE — 25000128 H RX IP 250 OP 636: Performed by: EMERGENCY MEDICINE

## 2017-02-20 PROCEDURE — 96376 TX/PRO/DX INJ SAME DRUG ADON: CPT | Performed by: EMERGENCY MEDICINE

## 2017-02-20 PROCEDURE — 74177 CT ABD & PELVIS W/CONTRAST: CPT

## 2017-02-20 PROCEDURE — 99207 ZZC APP CREDIT; MD BILLING SHARED VISIT: CPT | Performed by: PHYSICIAN ASSISTANT

## 2017-02-20 PROCEDURE — 25500064 ZZH RX 255 OP 636: Performed by: EMERGENCY MEDICINE

## 2017-02-20 PROCEDURE — 12000001 ZZH R&B MED SURG/OB UMMC

## 2017-02-20 PROCEDURE — 80053 COMPREHEN METABOLIC PANEL: CPT | Performed by: PHYSICIAN ASSISTANT

## 2017-02-20 PROCEDURE — 96374 THER/PROPH/DIAG INJ IV PUSH: CPT | Performed by: EMERGENCY MEDICINE

## 2017-02-20 PROCEDURE — 36415 COLL VENOUS BLD VENIPUNCTURE: CPT | Performed by: PHYSICIAN ASSISTANT

## 2017-02-20 PROCEDURE — 99223 1ST HOSP IP/OBS HIGH 75: CPT | Mod: AI | Performed by: INTERNAL MEDICINE

## 2017-02-20 PROCEDURE — 96375 TX/PRO/DX INJ NEW DRUG ADDON: CPT | Performed by: EMERGENCY MEDICINE

## 2017-02-20 PROCEDURE — 85025 COMPLETE CBC W/AUTO DIFF WBC: CPT | Performed by: EMERGENCY MEDICINE

## 2017-02-20 PROCEDURE — 25800025 ZZH RX 258: Performed by: PHYSICIAN ASSISTANT

## 2017-02-20 PROCEDURE — 83690 ASSAY OF LIPASE: CPT | Performed by: EMERGENCY MEDICINE

## 2017-02-20 PROCEDURE — 85610 PROTHROMBIN TIME: CPT | Performed by: EMERGENCY MEDICINE

## 2017-02-20 PROCEDURE — 83605 ASSAY OF LACTIC ACID: CPT

## 2017-02-20 PROCEDURE — 83605 ASSAY OF LACTIC ACID: CPT | Performed by: EMERGENCY MEDICINE

## 2017-02-20 PROCEDURE — 99285 EMERGENCY DEPT VISIT HI MDM: CPT | Performed by: EMERGENCY MEDICINE

## 2017-02-20 PROCEDURE — 80307 DRUG TEST PRSMV CHEM ANLYZR: CPT | Performed by: EMERGENCY MEDICINE

## 2017-02-20 PROCEDURE — 96361 HYDRATE IV INFUSION ADD-ON: CPT | Performed by: EMERGENCY MEDICINE

## 2017-02-20 PROCEDURE — 80053 COMPREHEN METABOLIC PANEL: CPT | Performed by: EMERGENCY MEDICINE

## 2017-02-20 PROCEDURE — 99285 EMERGENCY DEPT VISIT HI MDM: CPT | Mod: Z6 | Performed by: EMERGENCY MEDICINE

## 2017-02-20 PROCEDURE — 25000125 ZZHC RX 250: Performed by: EMERGENCY MEDICINE

## 2017-02-20 PROCEDURE — 80320 DRUG SCREEN QUANTALCOHOLS: CPT | Performed by: EMERGENCY MEDICINE

## 2017-02-20 PROCEDURE — 25000132 ZZH RX MED GY IP 250 OP 250 PS 637: Performed by: PHYSICIAN ASSISTANT

## 2017-02-20 RX ORDER — ONDANSETRON 4 MG/1
4 TABLET, ORALLY DISINTEGRATING ORAL EVERY 6 HOURS PRN
Status: DISCONTINUED | OUTPATIENT
Start: 2017-02-20 | End: 2017-02-22 | Stop reason: HOSPADM

## 2017-02-20 RX ORDER — HYDROMORPHONE HYDROCHLORIDE 2 MG/ML
2 INJECTION, SOLUTION INTRAMUSCULAR; INTRAVENOUS; SUBCUTANEOUS ONCE
Status: COMPLETED | OUTPATIENT
Start: 2017-02-20 | End: 2017-02-20

## 2017-02-20 RX ORDER — SODIUM CHLORIDE 9 MG/ML
1000 INJECTION, SOLUTION INTRAVENOUS CONTINUOUS
Status: DISCONTINUED | OUTPATIENT
Start: 2017-02-20 | End: 2017-02-20

## 2017-02-20 RX ORDER — NICOTINE 21 MG/24HR
1 PATCH, TRANSDERMAL 24 HOURS TRANSDERMAL DAILY
Status: DISCONTINUED | OUTPATIENT
Start: 2017-02-21 | End: 2017-02-20

## 2017-02-20 RX ORDER — AMOXICILLIN 250 MG
1-2 CAPSULE ORAL 2 TIMES DAILY
Status: DISCONTINUED | OUTPATIENT
Start: 2017-02-20 | End: 2017-02-22 | Stop reason: HOSPADM

## 2017-02-20 RX ORDER — BISACODYL 5 MG
5-15 TABLET, DELAYED RELEASE (ENTERIC COATED) ORAL DAILY PRN
Status: DISCONTINUED | OUTPATIENT
Start: 2017-02-20 | End: 2017-02-22 | Stop reason: HOSPADM

## 2017-02-20 RX ORDER — LORAZEPAM 2 MG/ML
1 INJECTION INTRAMUSCULAR ONCE
Status: COMPLETED | OUTPATIENT
Start: 2017-02-20 | End: 2017-02-20

## 2017-02-20 RX ORDER — IOPAMIDOL 755 MG/ML
100 INJECTION, SOLUTION INTRAVASCULAR ONCE
Status: COMPLETED | OUTPATIENT
Start: 2017-02-20 | End: 2017-02-20

## 2017-02-20 RX ORDER — LIDOCAINE 50 MG/G
1 PATCH TOPICAL
Status: DISCONTINUED | OUTPATIENT
Start: 2017-02-20 | End: 2017-02-22 | Stop reason: HOSPADM

## 2017-02-20 RX ORDER — NALOXONE HYDROCHLORIDE 0.4 MG/ML
.1-.4 INJECTION, SOLUTION INTRAMUSCULAR; INTRAVENOUS; SUBCUTANEOUS
Status: DISCONTINUED | OUTPATIENT
Start: 2017-02-20 | End: 2017-02-22 | Stop reason: HOSPADM

## 2017-02-20 RX ORDER — ONDANSETRON 2 MG/ML
4 INJECTION INTRAMUSCULAR; INTRAVENOUS EVERY 30 MIN PRN
Status: DISCONTINUED | OUTPATIENT
Start: 2017-02-20 | End: 2017-02-20 | Stop reason: DRUGHIGH

## 2017-02-20 RX ORDER — QUETIAPINE FUMARATE 100 MG/1
100 TABLET, FILM COATED ORAL AT BEDTIME
Status: DISCONTINUED | OUTPATIENT
Start: 2017-02-20 | End: 2017-02-22 | Stop reason: HOSPADM

## 2017-02-20 RX ORDER — BUPRENORPHINE AND NALOXONE 8; 2 MG/1; MG/1
.5-1 FILM, SOLUBLE BUCCAL; SUBLINGUAL 3 TIMES DAILY
COMMUNITY
End: 2018-05-29

## 2017-02-20 RX ORDER — PROCHLORPERAZINE MALEATE 5 MG
5-10 TABLET ORAL EVERY 6 HOURS PRN
Status: DISCONTINUED | OUTPATIENT
Start: 2017-02-20 | End: 2017-02-22 | Stop reason: HOSPADM

## 2017-02-20 RX ORDER — NICOTINE 21 MG/24HR
1 PATCH, TRANSDERMAL 24 HOURS TRANSDERMAL DAILY
Status: DISCONTINUED | OUTPATIENT
Start: 2017-02-20 | End: 2017-02-22 | Stop reason: HOSPADM

## 2017-02-20 RX ORDER — NALOXONE HYDROCHLORIDE 0.4 MG/ML
.1-.4 INJECTION, SOLUTION INTRAMUSCULAR; INTRAVENOUS; SUBCUTANEOUS
Status: DISCONTINUED | OUTPATIENT
Start: 2017-02-20 | End: 2017-02-20

## 2017-02-20 RX ORDER — HYDROXYZINE HYDROCHLORIDE 25 MG/1
25-50 TABLET, FILM COATED ORAL 2 TIMES DAILY
COMMUNITY
End: 2018-05-29

## 2017-02-20 RX ORDER — PROCHLORPERAZINE 25 MG
25 SUPPOSITORY, RECTAL RECTAL EVERY 12 HOURS PRN
Status: DISCONTINUED | OUTPATIENT
Start: 2017-02-20 | End: 2017-02-22 | Stop reason: HOSPADM

## 2017-02-20 RX ORDER — ONDANSETRON 2 MG/ML
4 INJECTION INTRAMUSCULAR; INTRAVENOUS EVERY 6 HOURS PRN
Status: DISCONTINUED | OUTPATIENT
Start: 2017-02-20 | End: 2017-02-22 | Stop reason: HOSPADM

## 2017-02-20 RX ORDER — SODIUM CHLORIDE, SODIUM LACTATE, POTASSIUM CHLORIDE, CALCIUM CHLORIDE 600; 310; 30; 20 MG/100ML; MG/100ML; MG/100ML; MG/100ML
INJECTION, SOLUTION INTRAVENOUS CONTINUOUS
Status: DISCONTINUED | OUTPATIENT
Start: 2017-02-20 | End: 2017-02-22 | Stop reason: HOSPADM

## 2017-02-20 RX ADMIN — SODIUM CHLORIDE 1000 ML: 9 INJECTION, SOLUTION INTRAVENOUS at 12:19

## 2017-02-20 RX ADMIN — HYDROMORPHONE HYDROCHLORIDE 1 MG: 1 INJECTION, SOLUTION INTRAMUSCULAR; INTRAVENOUS; SUBCUTANEOUS at 12:40

## 2017-02-20 RX ADMIN — HYDROMORPHONE HYDROCHLORIDE 2 MG: 2 INJECTION, SOLUTION INTRAMUSCULAR; INTRAVENOUS; SUBCUTANEOUS at 17:18

## 2017-02-20 RX ADMIN — LORAZEPAM 1 MG: 2 INJECTION INTRAMUSCULAR; INTRAVENOUS at 15:27

## 2017-02-20 RX ADMIN — HYDROMORPHONE HYDROCHLORIDE 1 MG: 1 INJECTION, SOLUTION INTRAMUSCULAR; INTRAVENOUS; SUBCUTANEOUS at 11:30

## 2017-02-20 RX ADMIN — HYDROMORPHONE HYDROCHLORIDE 1 MG: 1 INJECTION, SOLUTION INTRAMUSCULAR; INTRAVENOUS; SUBCUTANEOUS at 11:14

## 2017-02-20 RX ADMIN — SODIUM CHLORIDE 1000 ML: 9 INJECTION, SOLUTION INTRAVENOUS at 13:38

## 2017-02-20 RX ADMIN — HYDROMORPHONE HYDROCHLORIDE: 10 INJECTION, SOLUTION INTRAMUSCULAR; INTRAVENOUS; SUBCUTANEOUS at 19:25

## 2017-02-20 RX ADMIN — HYDROMORPHONE HYDROCHLORIDE 1 MG: 1 INJECTION, SOLUTION INTRAMUSCULAR; INTRAVENOUS; SUBCUTANEOUS at 13:04

## 2017-02-20 RX ADMIN — RANITIDINE HYDROCHLORIDE 50 MG: 25 INJECTION INTRAMUSCULAR; INTRAVENOUS at 12:20

## 2017-02-20 RX ADMIN — HYDROMORPHONE HYDROCHLORIDE 2 MG: 2 INJECTION, SOLUTION INTRAMUSCULAR; INTRAVENOUS; SUBCUTANEOUS at 15:55

## 2017-02-20 RX ADMIN — HYDROMORPHONE HYDROCHLORIDE 1 MG: 1 INJECTION, SOLUTION INTRAMUSCULAR; INTRAVENOUS; SUBCUTANEOUS at 11:59

## 2017-02-20 RX ADMIN — LORAZEPAM 1 MG: 2 INJECTION INTRAMUSCULAR; INTRAVENOUS at 14:05

## 2017-02-20 RX ADMIN — HYDROMORPHONE HYDROCHLORIDE 1 MG: 1 INJECTION, SOLUTION INTRAMUSCULAR; INTRAVENOUS; SUBCUTANEOUS at 13:28

## 2017-02-20 RX ADMIN — SODIUM CHLORIDE 1000 ML: 9 INJECTION, SOLUTION INTRAVENOUS at 11:14

## 2017-02-20 RX ADMIN — QUETIAPINE FUMARATE 100 MG: 100 TABLET, FILM COATED ORAL at 22:18

## 2017-02-20 RX ADMIN — ONDANSETRON 4 MG: 2 INJECTION INTRAMUSCULAR; INTRAVENOUS at 11:16

## 2017-02-20 RX ADMIN — SODIUM CHLORIDE, POTASSIUM CHLORIDE, SODIUM LACTATE AND CALCIUM CHLORIDE: 600; 310; 30; 20 INJECTION, SOLUTION INTRAVENOUS at 20:01

## 2017-02-20 RX ADMIN — NICOTINE 1 PATCH: 21 PATCH, EXTENDED RELEASE TRANSDERMAL at 21:44

## 2017-02-20 RX ADMIN — IOPAMIDOL 99 ML: 755 INJECTION, SOLUTION INTRAVENOUS at 14:41

## 2017-02-20 RX ADMIN — ONDANSETRON 4 MG: 2 INJECTION INTRAMUSCULAR; INTRAVENOUS at 18:23

## 2017-02-20 RX ADMIN — SODIUM CHLORIDE 66 ML: 9 INJECTION, SOLUTION INTRAVENOUS at 14:40

## 2017-02-20 ASSESSMENT — ENCOUNTER SYMPTOMS
VOMITING: 1
BLOOD IN STOOL: 0
BACK PAIN: 1
SHORTNESS OF BREATH: 0
ROS GI COMMENTS: NEGATIVE FOR HEMATEMESIS.
NAUSEA: 1
ANAL BLEEDING: 0
FEVER: 0
ABDOMINAL PAIN: 1

## 2017-02-20 NOTE — IP AVS SNAPSHOT
MRN:[id number]                      After Visit Summary   2/20/2017    Frank Arguello    MRN: [id number]           Thank you!     Thank you for choosing [facility] for your care. Our goal is always to provide you with excellent care. Hearing back from our patients is one way we can continue to improve our services. Please take a few minutes to complete the written survey that you may receive in the mail after you visit with us. Thank you!        Patient Information     Date Of Birth          1985        About your hospital stay     You were admitted on:  February 20, 2017 You last received care in the:  [address] [facility]    You were discharged on:  February 22, 2017        Reason for your hospital stay       Mild Pancreatitis resolved                  Who to Call     For medical emergencies, please call 911.  For non-urgent questions about your medical care, please call your primary care provider or clinic, [phone]          Attending Provider     Provider Specialty    Jaydon Diaz MD Emergency Medicine    Irlanda, Umm Brannon MD Emergency Medicine    Joby Patel MD Internal Medicine    Mickey Jang MD Internal Medicine       Primary Care Provider Office Phone # Fax #    Amy Starkey [phone]        [facility] [address] [address]         When to contact your care team       Call your primary doctor if you have any of the following: temperature greater than 38.1 or abdominal pain.                  After Care Instructions     Diet       Follow this diet upon discharge: Regular            Discharge Instructions                 Follow-up Appointments     Follow Up and recommended labs and tests       Follow up with primary care provider, Amy Starkey MD, within 7 days for hospital follow- up.    Followup with suboxone clinic in 2-5 days                  Your next 10 appointments already scheduled      Feb 28, 2017  1:30 PM CST   Return Visit with Amy Starkey MD   [facility] ([facility])    [address]  [address]  [facility] [id number] [phone]              Pending Results     No orders found from 2/18/2017 to 2/21/2017.            Admission Information     Date & Time Provider Department Dept. Phone    2/20/2017 Mickey Jang MD [address] [facility] [phone]      Your Vitals Were     Blood Pressure Pulse Temperature Respirations Weight Pulse Oximetry    162/106 (BP Location: Left arm) 65 98.1  F (36.7  C) (Oral) 18 97.5 kg (214 lb 14.4 oz) 100%    BMI (Body Mass Index)                   26.86 kg/m2           MyChart Information     [employer] gives you secure access to your electronic health record. If you see a primary care provider, you can also send messages to your care team and make appointments. If you have questions, please call your primary care clinic.  If you do not have a primary care provider, please call [phone] and they will assist you.        Care EveryWhere ID     This is your Care EveryWhere ID. This could be used by other organizations to access your [facility] medical records  [id number]           Review of your medicines      START taking        Dose / Directions    traMADol 50 MG tablet   Commonly known as:  ULTRAM   Used for:  Pancreatitis, recurrent (H)        Dose:  25 mg   Take 0.5 tablets (25 mg) by mouth every 6 hours as needed for pain maximum 3 tablet(s) per day   Quantity:  20 tablet   Refills:  0         CONTINUE these medicines which may have CHANGED, or have new prescriptions. If we are uncertain of the size of tablets/capsules you have at home, strength may be listed as something that might have changed.        Dose / Directions    QUEtiapine 50 MG tablet   Commonly known as:  SEROQUEL   This may have changed:    - how much to take  - when to take this   Used for:  Primary insomnia,  Uncomplicated opioid dependence (H), Alcohol-induced acute pancreatitis without infection or necrosis, Chronic abdominal pain, Anxiety        Dose:   mg   Take 1-2 tablets ( mg) by mouth nightly as needed   Quantity:  30 tablet   Refills:  1         CONTINUE these medicines which have NOT CHANGED        Dose / Directions    buprenorphine HCl-naloxone HCl 8-2 MG per film   Commonly known as:  SUBOXONE   Indication:  pain        Dose:  0.5-1 Film   Place 0.5-1 Film under the tongue 3 times daily Dose is 8 mg-2 mg in morning and early afternoon, and 4 mg-1 mg at night   Refills:  0       busPIRone 15 MG tablet   Commonly known as:  BUSPAR        Dose:  15 mg   Take 15 mg by mouth 3 times daily   Refills:  0       folic acid 1 MG tablet   Commonly known as:  FOLVITE   Used for:  Alcohol dependence with intoxication with complication (H)        Dose:  1 mg   Take 1 tablet (1 mg) by mouth daily   Quantity:  30 tablet   Refills:  11       gabapentin 300 MG capsule   Commonly known as:  NEURONTIN   Used for:  Chronic abdominal pain, Uncomplicated opioid dependence (H), Alcohol-induced acute pancreatitis without infection or necrosis, Anxiety, Primary insomnia        Dose:  900 mg   Take 3 capsules (900 mg) by mouth 3 times daily   Quantity:  270 capsule   Refills:  0       hydrOXYzine 25 MG tablet   Commonly known as:  ATARAX        Dose:  25-50 mg   Take 25-50 mg by mouth 2 times daily   Refills:  0       metoprolol 50 MG 24 hr tablet   Commonly known as:  TOPROL-XL   Used for:  Benign essential hypertension        Dose:  50 mg   Take 1 tablet (50 mg) by mouth daily   Quantity:  90 tablet   Refills:  3       mirtazapine 7.5 MG Tabs tablet   Commonly known as:  REMERON        Dose:  7.5 mg   Take 1 tablet (7.5 mg) by mouth At Bedtime   Quantity:  30 tablet   Refills:  3       multivitamin, therapeutic with minerals Tabs tablet   Used for:  Alcohol dependence with intoxication with complication (H)        Dose:   1 tablet   Take 1 tablet by mouth daily   Quantity:  30 each   Refills:  0       omeprazole 20 MG CR capsule   Commonly known as:  priLOSEC   Used for:  Uncomplicated opioid dependence (H), Alcohol-induced acute pancreatitis without infection or necrosis, Chronic abdominal pain, Anxiety, Primary insomnia        Dose:  20 mg   Take 20 mg by mouth daily   Refills:  0            Where to get your medicines      Some of these will need a paper prescription and others can be bought over the counter. Ask your nurse if you have questions.     Bring a paper prescription for each of these medications     traMADol 50 MG tablet                Protect others around you: Learn how to safely use, store and throw away your medicines at www.disposemymeds.org.             Medication List: This is a list of all your medications and when to take them. Check marks below indicate your daily home schedule. Keep this list as a reference.      Medications           Morning Afternoon Evening Bedtime As Needed    buprenorphine HCl-naloxone HCl 8-2 MG per film   Commonly known as:  SUBOXONE   Place 0.5-1 Film under the tongue 3 times daily Dose is 8 mg-2 mg in morning and early afternoon, and 4 mg-1 mg at night                                busPIRone 15 MG tablet   Commonly known as:  BUSPAR   Take 15 mg by mouth 3 times daily                                folic acid 1 MG tablet   Commonly known as:  FOLVITE   Take 1 tablet (1 mg) by mouth daily                                gabapentin 300 MG capsule   Commonly known as:  NEURONTIN   Take 3 capsules (900 mg) by mouth 3 times daily                                hydrOXYzine 25 MG tablet   Commonly known as:  ATARAX   Take 25-50 mg by mouth 2 times daily                                metoprolol 50 MG 24 hr tablet   Commonly known as:  TOPROL-XL   Take 1 tablet (50 mg) by mouth daily                                mirtazapine 7.5 MG Tabs tablet   Commonly known as:  REMERON   Take 1 tablet  (7.5 mg) by mouth At Bedtime                                multivitamin, therapeutic with minerals Tabs tablet   Take 1 tablet by mouth daily                                omeprazole 20 MG CR capsule   Commonly known as:  priLOSEC   Take 20 mg by mouth daily                                QUEtiapine 50 MG tablet   Commonly known as:  SEROQUEL   Take 1-2 tablets ( mg) by mouth nightly as needed   Last time this was given:  100 mg on 2/21/2017 10:25 PM                                traMADol 50 MG tablet   Commonly known as:  ULTRAM   Take 0.5 tablets (25 mg) by mouth every 6 hours as needed for pain maximum 3 tablet(s) per day

## 2017-02-20 NOTE — ED PROVIDER NOTES
History     Chief Complaint   Patient presents with     Abdominal Pain     upper abdomin radiating into back,  history of pancreatitis     HPI  Frank Arguello is a 31-year-old male who presents to the Emergency Department with complaints of recurrent epigastric pain that he believes is consistent with his history of chronic recurrent pancreatitis.  Patient states that he has had many episodes over the last 2 years and has had a previous stent placed.  Patient states that over the past few days he has had increasing pain associated with nausea and vomiting, no blood in his stool, no hematemesis and no fevers.  Patient states the pain is in his epigastrium and radiates into his back consistent with his previous pancreatitis pain.  The patient presents here to the ER for evaluation stating that he has been in the pain clinic and that they tried to put him on Suboxone for his pain.  He states that that has not worked however.    This part of the document was transcribed by Pita Rae Medical Scribe.  Past Medical History   Diagnosis Date     Anxiety      Depressive disorder      Pancreatitis      Pseudocyst of pancreas      Substance abuse        Past Surgical History   Procedure Laterality Date     Endoscopic retrograde cholangiopancreatogram  4/9/2014     Procedure: ENDOSCOPIC RETROGRADE CHOLANGIOPANCREATOGRAM;   Endoscopic Ultrasound with Fine needle aspiration, EsophagoGastroDuodenoscopy with stent placement into pseudeocyst, balloon dilation.  ;  Surgeon: Jagjit Dow MD;  Location: [facility] OR     Esophagoscopy, gastroscopy, duodenoscopy (egd), combined  4/9/2014     Procedure: COMBINED ENDOSCOPIC ULTRASOUND, ESOPHAGOSCOPY, GASTROSCOPY, DUODENOSCOPY (EGD), FINE NEEDLE ASPIRATE/BIOPSY;;  Surgeon: Jagjit Dow MD;  Location: [facility] OR     Esophagoscopy, gastroscopy, duodenoscopy (egd), combined  6/24/2014     Procedure: COMBINED ESOPHAGOSCOPY, GASTROSCOPY, DUODENOSCOPY (EGD);  Surgeon: Jagjit Dow  MD [place];  Location:  OR       Family History   Problem Relation Age of Onset     Alcohol/Drug Paternal Grandfather      Alcohol/Drug Father      Substance Abuse Father      Hypertension Mother      DIABETES No family hx of      CANCER No family hx of      Unknown/Adopted No family hx of      Depression No family hx of      Anxiety Disorder No family hx of      Schizophrenia No family hx of      Bipolar Disorder No family hx of      Suicide No family hx of      Dementia No family hx of      Keon Disease No family hx of      Parkinsonism No family hx of      Autism Spectrum Disorder No family hx of      Intellectual Disability (Mental Retardation) No family hx of      MENTAL ILLNESS No family hx of      Other - See Comments Brother       of heroin overdose     Substance Abuse Brother        Social History   Substance Use Topics     Smoking status: Current Every Day Smoker     Packs/day: 1.00     Types: Cigarettes     Smokeless tobacco: Never Used     Alcohol use No      Comment: Used to but quit       Previous Medications    BUSPIRONE (BUSPAR) 15 MG TABLET    Take 15 mg by mouth 3 times daily    FOLIC ACID (FOLVITE) 1 MG TABLET    Take 1 tablet (1 mg) by mouth daily    GABAPENTIN (NEURONTIN) 300 MG CAPSULE    Take 3 capsules (900 mg) by mouth 3 times daily    METOPROLOL (TOPROL-XL) 50 MG 24 HR TABLET    Take 1 tablet (50 mg) by mouth daily    MIRTAZAPINE (REMERON) 7.5 MG TABS TABLET    Take 1 tablet (7.5 mg) by mouth At Bedtime    MULTIVITAMIN, THERAPEUTIC WITH MINERALS (THERA-VIT-M) TABS    Take 1 tablet by mouth daily    OMEPRAZOLE (PRILOSEC) 20 MG CR CAPSULE        QUETIAPINE (SEROQUEL) 50 MG TABLET    Take 1-2 tablets ( mg) by mouth nightly as needed        Allergies   Allergen Reactions     Trazodone Other (See Comments)     erection     I have reviewed the Medications, Allergies, Past Medical and Surgical History, and Social History in the Epic system.    Review of Systems   Constitutional:  Negative for fever.   Respiratory: Negative for shortness of breath.    Cardiovascular: Negative for chest pain.   Gastrointestinal: Positive for abdominal pain, nausea and vomiting. Negative for anal bleeding and blood in stool.        Negative for hematemesis.   Musculoskeletal: Positive for back pain.   All other systems reviewed and are negative.      Physical Exam   BP: (!) 129/114  Pulse: 128  Temp: 97.9  F (36.6  C)  Resp: 18  Weight: 97.2 kg (214 lb 4 oz)  SpO2: 98 %  Physical Exam   Constitutional: He is oriented to person, place, and time. He appears distressed (secondary to epigastric pain).   Alert and conversant   HENT:   Head: Atraumatic.   Eyes: EOM are normal. Pupils are equal, round, and reactive to light.   Neck: Neck supple.   Cardiovascular: Normal heart sounds.    Pulmonary/Chest: Breath sounds normal.   Abdominal: There is tenderness (diffuse tenderness but maximal in the epigastrium.). There is guarding. There is no rebound.   Musculoskeletal: He exhibits no edema or tenderness.   Neurological: He is alert and oriented to person, place, and time. No cranial nerve deficit.   Grossly intact and symmetric   Skin: Skin is warm.   Psychiatric: He has a normal mood and affect.       ED Course     ED Course     Procedures        Patient IV placed for blood draw and medication administration.    Results for orders placed or performed during the hospital encounter of 02/20/17   CT Abdomen Pelvis w Contrast    Narrative    CT ABDOMEN AND PELVIS WITH CONTRAST   2/20/2017 2:46 PM     HISTORY: Pancreatitis, rule out hemorrhage or necrosis.    TECHNIQUE: CT abdomen and pelvis with 99 mL Isovue-370, 50 mL  Omnipaque 140 IV. Radiation dose for this scan was reduced using  automated exposure control, adjustment of the mA and/or kV according  to patient size, or iterative reconstruction technique.    COMPARISON: CT abdomen and pelvis 11/8/2016.    FINDINGS: There is diffuse inflammatory edema involving the  pancreatic  parenchyma. The enhancement pattern of the pancreatic parenchyma  appears similar to the expected residual parenchyma when compared to  11/8/2016. There are no convincing new areas of necrosis identified,  though the pancreas is diffusely involved with edema that is new.  There is mild peripancreatic fluid that does not appear loculated at  this time.    The liver, gallbladder, adrenals, spleen, and kidneys show no acute  findings. Unremarkable gallbladder. No acute bowel finding. No bowel  obstruction. No abscess is currently seen.      Impression    IMPRESSION:  1. New moderate acute edematous pancreatitis noted diffusely. The  enhancement pattern of the pancreatic parenchyma appears similar to  the expected parenchyma demonstrated on 11/8/2016. As such, no  convincing new areas of pancreatic necrosis are seen, though the  pancreas parenchyma appears atrophic. There is small peripancreatic  fluid.  2. No other acute finding.    ARUN MANZANARES MD   CBC with platelets differential   Result Value Ref Range    WBC 23.2 (H) 4.0 - 11.0 10e9/L    RBC Count 5.20 4.4 - 5.9 10e12/L    Hemoglobin 16.1 13.3 - 17.7 g/dL    Hematocrit 45.7 40.0 - 53.0 %    MCV 88 78 - 100 fl    MCH 31.0 26.5 - 33.0 pg    MCHC 35.2 31.5 - 36.5 g/dL    RDW 13.2 10.0 - 15.0 %    Platelet Count 234 150 - 450 10e9/L    Diff Method Automated Method     % Neutrophils 72.6 %    % Lymphocytes 18.9 %    % Monocytes 7.6 %    % Eosinophils 0.2 %    % Basophils 0.2 %    % Immature Granulocytes 0.5 %    Nucleated RBCs 0 0 /100    Absolute Neutrophil 16.8 (H) 1.6 - 8.3 10e9/L    Absolute Lymphocytes 4.4 0.8 - 5.3 10e9/L    Absolute Monocytes 1.8 (H) 0.0 - 1.3 10e9/L    Absolute Eosinophils 0.0 0.0 - 0.7 10e9/L    Absolute Basophils 0.1 0.0 - 0.2 10e9/L    Abs Immature Granulocytes 0.1 0 - 0.4 10e9/L    Absolute Nucleated RBC 0.0    INR   Result Value Ref Range    INR 1.07 0.86 - 1.14   Comprehensive metabolic panel   Result Value Ref Range    Sodium  136 133 - 144 mmol/L    Potassium 3.7 3.4 - 5.3 mmol/L    Chloride 99 94 - 109 mmol/L    Carbon Dioxide 16 (L) 20 - 32 mmol/L    Anion Gap 21 (H) 3 - 14 mmol/L    Glucose 70 70 - 99 mg/dL    Urea Nitrogen 17 7 - 30 mg/dL    Creatinine 0.91 0.66 - 1.25 mg/dL    GFR Estimate >90  Non  GFR Calc   >60 mL/min/1.7m2    GFR Estimate If Black >90   GFR Calc   >60 mL/min/1.7m2    Calcium 8.6 8.5 - 10.1 mg/dL    Bilirubin Total 0.7 0.2 - 1.3 mg/dL    Albumin 4.3 3.4 - 5.0 g/dL    Protein Total 8.3 6.8 - 8.8 g/dL    Alkaline Phosphatase 77 40 - 150 U/L    ALT 35 0 - 70 U/L    AST 31 0 - 45 U/L   Lipase   Result Value Ref Range    Lipase 660 (H) 73 - 393 U/L   Lactic acid   Result Value Ref Range    Lactic Acid 5.9 (HH) 0.7 - 2.1 mmol/L   UA with Microscopic reflex to Culture   Result Value Ref Range    Color Urine Yellow     Appearance Urine Clear     Glucose Urine Negative NEG mg/dL    Bilirubin Urine Negative NEG    Ketones Urine 40 (A) NEG mg/dL    Specific Gravity Urine 1.036 (H) 1.003 - 1.035    Blood Urine Negative NEG    pH Urine 5.5 5.0 - 7.0 pH    Protein Albumin Urine Negative NEG mg/dL    Urobilinogen mg/dL Normal 0.0 - 2.0 mg/dL    Nitrite Urine Negative NEG    Leukocyte Esterase Urine Negative NEG    Source Midstream Urine     WBC Urine 1 0 - 2 /HPF    RBC Urine <1 0 - 2 /HPF    Squamous Epithelial /HPF Urine <1 0 - 1 /HPF    Mucous Urine Present (A) NEG /LPF    Hyaline Casts 4 (H) 0 - 2 /LPF   Alcohol ethyl   Result Value Ref Range    Ethanol g/dL 0.03 (H) <0.01 g/dL   ISTAT gases lactate darline POCT   Result Value Ref Range    Ph Venous 7.33 7.32 - 7.43 pH    PCO2 Venous 42 40 - 50 mm Hg    PO2 Venous 35 25 - 47 mm Hg    Bicarbonate Venous 22 21 - 28 mmol/L    O2 Sat Venous 62 %    Lactic Acid 2.5 (H) 0.7 - 2.1 mmol/L       Assessments & Plan (with Medical Decision Making)     I have reviewed the nursing notes.    Patient was found to have a lactate of almost 6 initially and received  2 L of IV fluid followed by continuous infusion of IV fluid.  Patient also received 6 mg of Dilaudid and still did not have adequate pain control.  With his lactate elevated and his lipase elevated, the patient underwent CT scanning which revealed acute edema around his pancreas without evidence of necrosis or hemorrhage.  At this point the patient's pulse has come down into the 80s and his lactate has come down into the twos.  The patient will be started on a PCA Dilaudid pump as his requirements for pain control are significant.    Medications   sodium chloride (PF) 0.9% PF flush 3 mL (not administered)   sodium chloride (PF) 0.9% PF flush 3 mL (not administered)   0.9% sodium chloride BOLUS (0 mLs Intravenous Stopped 2/20/17 1218)     Followed by   0.9% sodium chloride BOLUS (0 mLs Intravenous Stopped 2/20/17 1339)     Followed by   0.9% sodium chloride infusion (1,000 mLs Intravenous Rate/Dose Verify 2/20/17 1555)   ondansetron (ZOFRAN) injection 4 mg (4 mg Intravenous Given 2/20/17 1116)   sodium chloride 0.9 % for CT scan flush dose 50 mL (66 mLs As instructed Given 2/20/17 1440)   HYDROmorphone (DILAUDID) injection 1 mg (1 mg Intravenous Given 2/20/17 1159)   ranitidine (ZANTAC) injection 50 mg (50 mg Intravenous Given 2/20/17 1220)   HYDROmorphone (DILAUDID) injection 1 mg (1 mg Intravenous Given 2/20/17 1328)   iohexol (OMNIPAQUE) solution 25 mL (25 mLs Oral Given 2/20/17 1330)   LORazepam (ATIVAN) injection 1 mg (1 mg Intravenous Given 2/20/17 1405)   iopamidol (ISOVUE-370) solution 100 mL (99 mLs Intravenous Given 2/20/17 1441)   LORazepam (ATIVAN) injection 1 mg (1 mg Intravenous Given 2/20/17 1527)   HYDROmorphone (DILAUDID) injection 2 mg (2 mg Intravenous Given 2/20/17 1555)       Patient will be admitted to the [facility] where gastroenterology will be available for consultation as needed.    I have reviewed the findings, diagnosis, and plan with the patient.    Final diagnoses:   Pancreatitis,  recurrent (H) - acute/chronic     Jaydon Diaz MD    2/20/2017   [facility], [place], EMERGENCY DEPARTMENT     Jaydon Diaz MD  02/20/17 [id number]

## 2017-02-20 NOTE — IP AVS SNAPSHOT
[address] [address]    Phone:  [phone]                                       After Visit Summary   2/20/2017    Frank Arguello    MRN: [id number]           After Visit Summary Signature Page     I have received my discharge instructions, and my questions have been answered. I have discussed any challenges I see with this plan with the nurse or doctor.    ..........................................................................................................................................  Patient/Patient Representative Signature      ..........................................................................................................................................  Patient Representative Print Name and Relationship to Patient    ..................................................               ................................................  Date                                            Time    ..........................................................................................................................................  Reviewed by Signature/Title    ...................................................              ..............................................  Date                                                            Time

## 2017-02-20 NOTE — LETTER
Transition Communication Hand-off for Care Transitions to Next Level of Care Provider    Name: Frank Arguello  MRN #: [id number]  Primary Care Provider: Amy Starkey MD     Primary Clinic: [facility] [address] [place] [address]  [facility] [address]     Reason for Hospitalization:  Pancreatitis, recurrent (H) [K86.1]  Admit Date/Time: 2/20/2017 10:46 AM  Discharge Date: 2/22/2017   Payor Source: Payor: MEDICA / Plan: MEDICA [place] / Product Type: HMO /          Reason for Communication Hand-off Referral: Fragility  Follow-up plan:  Future Appointments  Date Time Provider Department Center   2/28/2017 1:30 PM Amy Satrkey MD [facility]     Follow with PCP for pain management and post hospital dc follow up.      Mini Ansari, RNCC, BSN    [facility]    Medicine Group  [address] [place] [id number]    [email or web address]  [email or web address]    Office: [phone] Pager: [phone]       AVS/Discharge Summary is the source of truth; this is a helpful guide for improved communication of patient story

## 2017-02-20 NOTE — PHARMACY-ADMISSION MEDICATION HISTORY
Admission Medication History status for the 2/20/2017 admission is complete.  See EPIC admission navigator for Prior to Admission medications.    Medication history sources:  patient, [facility], chart review     Medication history source reliability: Good. Patient report matches with pharmacy and chart review    Medication adherence:  Moderate.   -suboxone: patient says he stopped it 2 days ago. He is not sure why.  -mirtazapine: patient stopped a week ago, lack of efficacy  -he reports last took all other medications yesterday    Changes made to PTA medication list (reason)  Added:   -suboxone  -hydroxyzine  Deleted:   none  Changed:   -omeprazole (added dose/directions)    Additional medication history information (including reliability of information, actions taken by pharmacist): None    Time spent in this activity: 30 minutes    Medication history completed by: Eulogio Murrell, PharmD, BCPP    Prior to Admission medications    Medication Sig Last Dose Taking? Auth Provider   buprenorphine HCl-naloxone HCl (SUBOXONE) 8-2 MG per film Place 0.5-1 Film under the tongue 3 times daily Dose is 8 mg-2 mg in morning and early afternoon, and 4 mg-1 mg at night 2/18/2017 at pm Yes Unknown, Entered By History   hydrOXYzine (ATARAX) 25 MG tablet Take 25-50 mg by mouth 2 times daily Past Month Yes Unknown, Entered By History   omeprazole (PRILOSEC) 20 MG CR capsule Take 20 mg by mouth daily  2/19/2017 at am Yes Reported, Patient   gabapentin (NEURONTIN) 300 MG capsule Take 3 capsules (900 mg) by mouth 3 times daily 2/19/2017 at pm Yes Amy Starkey MD   QUEtiapine (SEROQUEL) 50 MG tablet Take 1-2 tablets ( mg) by mouth nightly as needed  Patient taking differently: Take 100 mg by mouth At Bedtime  2/19/2017 at hs Yes Amy Starkey MD   metoprolol (TOPROL-XL) 50 MG 24 hr tablet Take 1 tablet (50 mg) by mouth daily 2/19/2017 at am Yes Amy Starkey MD   busPIRone (BUSPAR) 15 MG tablet Take 15 mg  by mouth 3 times daily Past Week Yes Reported, Patient   multivitamin, therapeutic with minerals (THERA-VIT-M) TABS Take 1 tablet by mouth daily 2/19/2017 at Unknown time Yes Gautam Jimenez MD   mirtazapine (REMERON) 7.5 MG TABS tablet Take 1 tablet (7.5 mg) by mouth At Bedtime >1 week ago  Amy Starkey MD   folic acid (FOLVITE) 1 MG tablet Take 1 tablet (1 mg) by mouth daily Unknown  Mickey Jang MD

## 2017-02-21 LAB
ALBUMIN SERPL-MCNC: 3.2 G/DL (ref 3.4–5)
ALP SERPL-CCNC: 60 U/L (ref 40–150)
ALT SERPL W P-5'-P-CCNC: 19 U/L (ref 0–70)
ANION GAP SERPL CALCULATED.3IONS-SCNC: 9 MMOL/L (ref 3–14)
AST SERPL W P-5'-P-CCNC: 15 U/L (ref 0–45)
BILIRUB SERPL-MCNC: 1.1 MG/DL (ref 0.2–1.3)
BUN SERPL-MCNC: 8 MG/DL (ref 7–30)
CALCIUM SERPL-MCNC: 8 MG/DL (ref 8.5–10.1)
CHLORIDE SERPL-SCNC: 106 MMOL/L (ref 94–109)
CO2 SERPL-SCNC: 24 MMOL/L (ref 20–32)
CREAT SERPL-MCNC: 0.9 MG/DL (ref 0.66–1.25)
ERYTHROCYTE [DISTWIDTH] IN BLOOD BY AUTOMATED COUNT: 13.1 % (ref 10–15)
GFR SERPL CREATININE-BSD FRML MDRD: ABNORMAL ML/MIN/1.7M2
GLUCOSE SERPL-MCNC: 101 MG/DL (ref 70–99)
HCT VFR BLD AUTO: 35.7 % (ref 40–53)
HGB BLD-MCNC: 12.4 G/DL (ref 13.3–17.7)
LIPASE SERPL-CCNC: 244 U/L (ref 73–393)
MCH RBC QN AUTO: 29.7 PG (ref 26.5–33)
MCHC RBC AUTO-ENTMCNC: 34.7 G/DL (ref 31.5–36.5)
MCV RBC AUTO: 86 FL (ref 78–100)
PLATELET # BLD AUTO: 133 10E9/L (ref 150–450)
POTASSIUM SERPL-SCNC: 3.6 MMOL/L (ref 3.4–5.3)
PROT SERPL-MCNC: 6.3 G/DL (ref 6.8–8.8)
RBC # BLD AUTO: 4.17 10E12/L (ref 4.4–5.9)
SODIUM SERPL-SCNC: 139 MMOL/L (ref 133–144)
WBC # BLD AUTO: 6.5 10E9/L (ref 4–11)

## 2017-02-21 PROCEDURE — 25000132 ZZH RX MED GY IP 250 OP 250 PS 637: Performed by: INTERNAL MEDICINE

## 2017-02-21 PROCEDURE — 83690 ASSAY OF LIPASE: CPT | Performed by: PHYSICIAN ASSISTANT

## 2017-02-21 PROCEDURE — 36415 COLL VENOUS BLD VENIPUNCTURE: CPT | Performed by: PHYSICIAN ASSISTANT

## 2017-02-21 PROCEDURE — 99222 1ST HOSP IP/OBS MODERATE 55: CPT | Performed by: NURSE PRACTITIONER

## 2017-02-21 PROCEDURE — 80053 COMPREHEN METABOLIC PANEL: CPT | Performed by: PHYSICIAN ASSISTANT

## 2017-02-21 PROCEDURE — 12000001 ZZH R&B MED SURG/OB UMMC

## 2017-02-21 PROCEDURE — 25000128 H RX IP 250 OP 636: Performed by: INTERNAL MEDICINE

## 2017-02-21 PROCEDURE — 85027 COMPLETE CBC AUTOMATED: CPT | Performed by: PHYSICIAN ASSISTANT

## 2017-02-21 PROCEDURE — 99233 SBSQ HOSP IP/OBS HIGH 50: CPT | Performed by: INTERNAL MEDICINE

## 2017-02-21 PROCEDURE — 25800025 ZZH RX 258: Performed by: PHYSICIAN ASSISTANT

## 2017-02-21 PROCEDURE — 40000556 ZZH STATISTIC PERIPHERAL IV START W US GUIDANCE

## 2017-02-21 PROCEDURE — 25000132 ZZH RX MED GY IP 250 OP 250 PS 637: Performed by: PHYSICIAN ASSISTANT

## 2017-02-21 RX ORDER — KETOROLAC TROMETHAMINE 30 MG/ML
30 INJECTION, SOLUTION INTRAMUSCULAR; INTRAVENOUS ONCE
Status: COMPLETED | OUTPATIENT
Start: 2017-02-21 | End: 2017-02-21

## 2017-02-21 RX ORDER — KETOROLAC TROMETHAMINE 15 MG/ML
15 INJECTION, SOLUTION INTRAMUSCULAR; INTRAVENOUS 3 TIMES DAILY PRN
Status: DISCONTINUED | OUTPATIENT
Start: 2017-02-21 | End: 2017-02-22 | Stop reason: HOSPADM

## 2017-02-21 RX ORDER — HYDROMORPHONE HYDROCHLORIDE 2 MG/1
2 TABLET ORAL
Status: DISCONTINUED | OUTPATIENT
Start: 2017-02-21 | End: 2017-02-22 | Stop reason: HOSPADM

## 2017-02-21 RX ADMIN — SODIUM CHLORIDE, POTASSIUM CHLORIDE, SODIUM LACTATE AND CALCIUM CHLORIDE: 600; 310; 30; 20 INJECTION, SOLUTION INTRAVENOUS at 09:35

## 2017-02-21 RX ADMIN — SENNOSIDES AND DOCUSATE SODIUM 2 TABLET: 8.6; 5 TABLET ORAL at 07:54

## 2017-02-21 RX ADMIN — SODIUM CHLORIDE, POTASSIUM CHLORIDE, SODIUM LACTATE AND CALCIUM CHLORIDE: 600; 310; 30; 20 INJECTION, SOLUTION INTRAVENOUS at 15:56

## 2017-02-21 RX ADMIN — HYDROMORPHONE HYDROCHLORIDE 2 MG: 2 TABLET ORAL at 19:03

## 2017-02-21 RX ADMIN — HYDROMORPHONE HYDROCHLORIDE 1 MG: 1 INJECTION, SOLUTION INTRAMUSCULAR; INTRAVENOUS; SUBCUTANEOUS at 02:46

## 2017-02-21 RX ADMIN — HYDROMORPHONE HYDROCHLORIDE 2 MG: 2 TABLET ORAL at 15:57

## 2017-02-21 RX ADMIN — NICOTINE 1 PATCH: 21 PATCH, EXTENDED RELEASE TRANSDERMAL at 07:54

## 2017-02-21 RX ADMIN — QUETIAPINE FUMARATE 100 MG: 100 TABLET, FILM COATED ORAL at 22:25

## 2017-02-21 RX ADMIN — SODIUM CHLORIDE, POTASSIUM CHLORIDE, SODIUM LACTATE AND CALCIUM CHLORIDE: 600; 310; 30; 20 INJECTION, SOLUTION INTRAVENOUS at 22:39

## 2017-02-21 RX ADMIN — SODIUM CHLORIDE, POTASSIUM CHLORIDE, SODIUM LACTATE AND CALCIUM CHLORIDE: 600; 310; 30; 20 INJECTION, SOLUTION INTRAVENOUS at 02:50

## 2017-02-21 RX ADMIN — HYDROMORPHONE HYDROCHLORIDE 2 MG: 2 TABLET ORAL at 22:15

## 2017-02-21 RX ADMIN — LIDOCAINE 1 PATCH: 50 PATCH CUTANEOUS at 01:25

## 2017-02-21 RX ADMIN — KETOROLAC TROMETHAMINE 30 MG: 30 INJECTION, SOLUTION INTRAMUSCULAR at 01:26

## 2017-02-21 NOTE — PLAN OF CARE
Problem: Goal Outcome Summary  Goal: Goal Outcome Summary  Outcome: No Change  Pt A/O, VSS on RA with slight elevation in BP. Pt arrived to unit early with evening with abdominal pain radiating to back. Pt has h/o recurrent pancreatitis. PCA Diluadid initiated upon admission. Pt states PCA dose not effective for pain management. Provider aware, dose maintained. Warm pack in use as well. LR ringers to LPIV at 150ml/hr. Pt NPO. Nursing to monitor, continue POC.

## 2017-02-21 NOTE — PROVIDER NOTIFICATION
Gold team notified again of pt's report of intolerable pain. Very upset. MD came to talk with patient. One time dose of IV dilaudid ordered. Pt reported it wouldn't do anything, but was agreeable to have it. Will continue to monitor.

## 2017-02-21 NOTE — H&P
[facility]   History & Physical    Frank Arguello MRN# [id number]   Age: 31 year old YOB: 1985     Date of Admission: 2/20/2017  Service: Gold Night    Primary Care Provider: Amy Starkey         Assessment and Plan:   Frank Arguello is a 31 year old male with PMH of Etoh dependence, A/c Etoh pancreatitis, Etoh hepatitis, Adjustment disorder with anxiety, H/o of heroin abuse, Tobacco abuse disorder, opioid abuse/dependence s/p methadone treatment, currently on suboxone who presented to [place] ED with complaints of epigastric abdominal pain.     Acute Abdominal pain- likely 2/2 A/c Pancreatitis: Reports numerous episodes in past year. Most recently 11/7/17. Presented to  ED with complaints of acute onset epigastric abdominal pain with radiation to the back, and 2 episodes of non-bloody emesis. ED course revealed an elevated lactate-  LA initially 5.9 --> 2.5. Leukocytosis of 23.2, Lipase 660, Bicarb 16, AG 21. LFT's wnl. CT A/P with new moderate, acute, diffuse edematous pancreatitis. No new areas of necrosis seen. Small peripancreatic fluid. Drug screen positive for opiates. Etoh 0.03. Patient denies recent use. UA with ketones at 40 and Specific [place] 1.036. Patient received 2L NS fluid bolus and IV dilaudid. Pain not controled, so PCA ordered prior to [place] arrival. Suspect that patient is experiencing pancreatitis flare with elevated lipase, CT results and epigastric abdominal pain with radiation to the back. Currently, LA 1.2, AG 8, WBC 14.1.   - NPO  - Continue PCA. Cautious use of opioids as patient with hx of opioid dependence.  - LR at 150 cc/hr  - Repeat CMP, LA, CBC now and in am  - IV protonix  - GI consult if patient not improving     Opioid Dependence: Per chart review. Was previously maintained on Methadone, though reports more recently Suboxone ( last dose reported 3 days ago). Follows with Dr. Amy Ac for pain management.  Appears that he may have had trouble with insurance coverage of higher dose and was planned to taper off ( most recent clinic note 1/25/17). Utox positive for opiates and requiring large doses of dilaudid. Placed on PCA in  ED.   - Cautious use of opiates in patient with hx as above      Etoh Dependence: Per chart review, patient with Etoh history since age of 16. Hx of 1.5 Liter/day, though denies use in pat 2-3 months. Etoh in ED 0.03- denies use.   - MSSA if patient with s/s w/d  - Continuous pulse ox    Anxiety/Depression: with adjustment disorder and hx of panic attacks. PTA Seroquel, Buspar, metoprolol, Gabapentin.  - Continue when tolerating PO intake    Tobacco Abuse Disorder: Current pack/day smoker.   - Nicotine patch  - Encourage cessation    Discussed with Dr. Patel.     FEN: NPO, LR  Prophylaxis: PPI, Mechanical  Code Status: Full Code    Whitney Dunn PA-C  Hospitalist Medicine  Pager: [phone]         Chief Complaint:   Epigastric abdominal pain         History of Present Illness:   Frank Arguello is a 31 year old male with PMH of polysubstance abuse, Etoh abuse, recurrent Etoh pancreatitis, and anxiety, who presented to [place] ED with complaints of ~ 18 hour hx of acute epigastric abdominal pain with radiation to the back and 2 episodes of non-bloody emesis. No fever, CP, diarrhea, or urinary sx. No reports of sick contacts, changes in medications, Etoh, opiates, or illicit drug use. Patient states that movement made pain worse. Eating and drinking did not seem to worsen or make sx better. Epigastric pain described as sharp, intermittent, with radiation to the back. Sx are described as similar to previous episodes. Most recent pancreatitis flare reported as 11/7/17- admission.     Currently, patient with reports of uncontrolled pain, though received large doses of dilaudid in ED and placed on a PCA. Patient upset about his pain control.     Patient does not endorse: headaches, changes in  vision, chest pain, palpitations, upper respiratory symptoms of rhinorrhea or congestion, shortness of breath, cough, sputum production, wheezing,  nausea, emesis, constipation, diarrhea, dysuria, edema, rashes, weakness, focal neurologic deficits, recent travel, illness, fever, chills.             Review of Systems:   A 10 point review of systems was performed and is negative unless otherwise noted in HPI.          Past Medical History:     Past Medical History   Diagnosis Date     Anxiety      Depressive disorder      Pancreatitis      Pseudocyst of pancreas      Substance abuse              Past Surgical History:      Past Surgical History   Procedure Laterality Date     Endoscopic retrograde cholangiopancreatogram  4/9/2014     Procedure: ENDOSCOPIC RETROGRADE CHOLANGIOPANCREATOGRAM;   Endoscopic Ultrasound with Fine needle aspiration, EsophagoGastroDuodenoscopy with stent placement into pseudeocyst, balloon dilation.  ;  Surgeon: Jagjit Dow MD;  Location: [facility] OR     Esophagoscopy, gastroscopy, duodenoscopy (egd), combined  4/9/2014     Procedure: COMBINED ENDOSCOPIC ULTRASOUND, ESOPHAGOSCOPY, GASTROSCOPY, DUODENOSCOPY (EGD), FINE NEEDLE ASPIRATE/BIOPSY;;  Surgeon: Jagjit Dow MD;  Location: [facility] OR     Esophagoscopy, gastroscopy, duodenoscopy (egd), combined  6/24/2014     Procedure: COMBINED ESOPHAGOSCOPY, GASTROSCOPY, DUODENOSCOPY (EGD);  Surgeon: Jagjit Dow MD;  Location: [facility] OR             Family History:     Family History   Problem Relation Age of Onset     Alcohol/Drug Paternal Grandfather      Alcohol/Drug Father      Substance Abuse Father      Hypertension Mother      DIABETES No family hx of      CANCER No family hx of      Unknown/Adopted No family hx of      Depression No family hx of      Anxiety Disorder No family hx of      Schizophrenia No family hx of      Bipolar Disorder No family hx of      Suicide No family hx of      Dementia No family hx of      [place]  Disease No family hx of      Parkinsonism No family hx of      Autism Spectrum Disorder No family hx of      Intellectual Disability (Mental Retardation) No family hx of      MENTAL ILLNESS No family hx of      Other - See Comments Brother       of heroin overdose     Substance Abuse Brother              Social History:     Social History   Substance Use Topics     Smoking status: Current Every Day Smoker     Packs/day: 1.00     Types: Cigarettes     Smokeless tobacco: Never Used     Alcohol use No      Comment: Used to but quit             Medications:     No current facility-administered medications on file prior to encounter.   Current Outpatient Prescriptions on File Prior to Encounter:  omeprazole (PRILOSEC) 20 MG CR capsule Take 20 mg by mouth daily    gabapentin (NEURONTIN) 300 MG capsule Take 3 capsules (900 mg) by mouth 3 times daily   QUEtiapine (SEROQUEL) 50 MG tablet Take 1-2 tablets ( mg) by mouth nightly as needed (Patient taking differently: Take 100 mg by mouth At Bedtime )   metoprolol (TOPROL-XL) 50 MG 24 hr tablet Take 1 tablet (50 mg) by mouth daily   busPIRone (BUSPAR) 15 MG tablet Take 15 mg by mouth 3 times daily   multivitamin, therapeutic with minerals (THERA-VIT-M) TABS Take 1 tablet by mouth daily   mirtazapine (REMERON) 7.5 MG TABS tablet Take 1 tablet (7.5 mg) by mouth At Bedtime   folic acid (FOLVITE) 1 MG tablet Take 1 tablet (1 mg) by mouth daily            Allergies:     Allergies   Allergen Reactions     Trazodone Other (See Comments)     erection             Physical Exam:   BP (!) 135/99 (BP Location: Right arm)  Pulse 83  Temp 97.2  F (36.2  C) (Oral)  Resp 18  Wt 98.4 kg (216 lb 14.4 oz)  SpO2 99%  BMI 27.11 kg/m2   GENERAL: Male sitting on bed. Alert and oriented x 3. NAD. Appears upset and fixated on pain medication   HEENT: Anicteric sclera. PERRL. Mucous membranes moist and without lesions.   CV: RRR. S1, S2. No murmurs appreciated.   RESPIRATORY: Effort normal on  RA. Lungs CTAB with no wheezing, rales, rhonchi.   GI: Abdomen soft and non distended with normoactive bowel sounds present in all quadrants. Mild RUQ and epigastric abdominal tenderness. No rebound, guarding.   MUSCULOSKELETAL: No joint swelling or tenderness. Moves all extremities.   NEUROLOGICAL: No focal deficits. Strength 5/5 bilaterally in upper and lower extremities.   EXTREMITIES: No peripheral edema. Intact bilateral pedal pulses.   SKIN: No jaundice. No rashes to exposed skin areas.          Labs:   CBC:  Recent Labs   Lab Test  02/20/17   1118   WBC  23.2*   RBC  5.20   HGB  16.1   HCT  45.7   MCV  88   MCH  31.0   MCHC  35.2   RDW  13.2   PLT  234       CMP:  Recent Labs   Lab Test  02/20/17   1118   NA  136   POTASSIUM  3.7   CHLORIDE  99   ALEYDA  8.6   CO2  16*   BUN  17   CR  0.91   GLC  70   AST  31   ALT  35   BILITOTAL  0.7   ALBUMIN  4.3   PROTTOTAL  8.3   ALKPHOS  77       INR:   Recent Labs   Lab Test  02/20/17   1118   INR  1.07            Imaging:   CT A/P: 2/20/17       New moderate acute edematous pancreatitis noted diffusely. The  enhancement pattern of the pancreatic parenchyma appears similar to  the expected parenchyma demonstrated on 11/8/2016. As such, no  convincing new areas of pancreatic necrosis are seen, though the  pancreas parenchyma appears atrophic. There is small peripancreatic  fluid.

## 2017-02-21 NOTE — CONSULTS
"Inpatient Pain Management Service: Consultation      DATE OF CONSULT: February 21, 2017      REASON FOR PAIN CONSULTATION:  Frank Arguello is a 31 year old male I am seeing in consultation at the request of Dr. Joby Jang for evaluation and recommendations for his acute on chronic abdominal pain.       CHIEF PAIN COMPLAINT: abdominal pain      ASSESSMENT:   1. Acute on Chronic abdominal pain, admitted on 02/20/17 with increased epigastric pain that radiates to his back and episodes of nausea with emesis. Lipase was 660 upon admission, improving today, CT of abdomen from 02/20/17 shows new acute moderate edematous pancrease with no new necrosis. Patient was place on Dilaudid PCA last night, and  he reports today his pain is improving.   2. History of recurrent pancreatitis, secondary to alcohol use, with chronic abdominal pain. Patient reports chronic abdominal pain waxes and wanes at baseline, and is exacerbated by strenuous physical activity, and with eating at times.   3. History of anxiety  4. History of depression  5. History of polysubstance abuse, patient reports a history of opioid abuse for which he has undergone chemical dependency treatment, was on methadone maintenance treatment that he reports he weaned off several years ago. He reports a history of ETOH abuse and has undergone detox in 2016, and continues to follow with sponsor and he reports, he attends AA meetings but \"not as often as I probably should\". He reports he last used alcohol on Friday 02/17/17 ([employer]). Patient reports a remote history of illicit drug use including heroin and marijuana, that he reports was (years ago) and denies any current use.   6. While it appears to be concerning the patient had a positive urine drug screen for opiates in the ED, while not receiving opioids outpatient, It appears per chart review, that the patient had the Urine Drug Screen collected on 02/20/17 at 03:12PM After receiving IV HYDROmorphone in the ED. " Patient actually received 5 mg of IV HYDROmorphone prior to the Urine Drug screen. Also of note: Suboxone should not trigger a false positive for opiates on a UDS per the [employer] (NAABT.org).  7. On Suboxone treatment managed by Dr. Amy Starkey at [facility]. Per Dr. Starkey's last note 01/25/17 Plan is for patient to taper off Suboxone and establish care with [facility]. Patient reports he does not yet have a Pain Clinic appointment. His next appointment with Dr. Starkey is on 02/28/17.    Outpatient medications related to pain prior to admission:   --Suboxone 8-2 SL films, 1 film in AM, 1 film in afternoon and 1/2 film at bedtime last filled on 02/01/17, # 63 for a 26 day supply. (patient reports he was trying to taper off this as soon as possible prior to admission and reports he was taking 1/2 SL film 2-3 times per day prior to admission, and reports the last time he used Suboxone was Saturday 02/18/17.     Outpatient Suboxone prescribed by:  Amy Starkey  PRIMARY CARE PROVIDER: Amy Starkey  PAIN SPECIALIST: NONE, patient is supposed to establish care with [facility] per Dr. Amy Starkey's last clinic note on 01/25/17    [facility] database reviewed: Reviewed      TREATMENT RECOMMENDATIONS/PLAN:   1. When primary team ready to transition to orals:     Discontinue HYDROmorphone PCA    Start HYDROmorphone 2-4 mg PO Q 3 hours PRN    Taper HYDROmorphone to off prior to discharge, Do not recommend prescribing opioids upon discharge.    Upon discharge patient to resume home Suboxone and follow up with Dr. Starkey for further management of taper.  2. Would not recommend re-starting Suboxone at this time as the patient has been off Suboxone for 2-3 days now (per patient's report) and he has been receiving opioids in the inpatient setting for treatment of his acute abdominal pain. Re-initiation of  Suboxone, while on greater than 30-80 OME/day will precipitate acute withdrawal.  3. Start acetaminophen 975 mg PO Q 8 hours, continue upon discharge x 7 days then change to PRN  4. Start ketorolac 30 mg IV Q 6 hours scheduled x 24-48 hours. Then change to oral NSAID of primary teams choice.  5. Start gabapentin 300mg PO Q 8 hours, patient reports this helped with his abdominal pain with previous trial.  6. Discontinue lidocaine patches, patient reports these do not help  7. Patient declines menthol patches, and reports these have not helped with previous trials.  8. No place for muscle relaxant as he denies muscle spasms and there does not appear to be a myofascial component.   9. Bowel regimen for opioid induced constipation while on opioids.   10. I have concerns for this patient stopping his Suboxone treatment given his history of chemical dependency, periods of sobriety and relapses that include using opioids and heroin.  Patient also has a history of being on methadone maintenance, and reason for stopping this is not quite clear as patient states he tapered himself off. Patient is at significant increased risk for relapse of opioid abuse, without some sort of maintenance therapy, his risk for relapse is significantly increased in the setting of recurrent pancreatitis episodes that require frequent hospitalizations and opioids to treat his pain. Would recommend patient follow up with Dr. Starkey outpatient for further mangement of his Suboxone. However, this is a difficult situation in that the patient is quite determined that he wants to taper off Suboxone. It is also concerning the patient stopped taking his Suboxone 2-3 days prior to coming into the ED.   11. Agree with the recommendation from Dr. Amy Starkey that the patient establish care with the [facility], Patient does not yet have an appointment scheduled.   12. Pain service will sign off at this time.       ASSESSMENT AND  RECOMMENDATIONS DISCUSSED WITH: Mickey Jang MD, plan discussed with Dr. Thaddeus Chavarria MD from the pain service.       Thank you for consulting the Inpatient Pain Management Service.   The above recommendations are to be acted upon at the primary team s discretion.    To reach us:  Mon - Friday 8 AM - 3 PM: Pager [phone]   After hours, weekends and holidays: Primary service should call [phone] for the on-call pain specialist    HISTORY OF PRESENT ILLNESS: Frank Arguello is a 31 year old male with history of re-current pancreatitis, anxiety, depression, admitted on 02/20/17 with increased epigastric pain that radiates to his back and episodes of nausea with emesis. Lipase was 660 upon admission, improving today, CT of abdomen from 02/20/17 shows new acute moderate edematous pancrease with no new necrosis. Patient was place on Dilaudid PCA last night, and  he reports today his pain is improving.   Today patient reports his pain is located in his entire abdomen and radiates to his through to his back. He reports his pain is tolerable with discomfort, a dull ache, that is constant, worse with activity, better with rest and when he uses the PCA button. He reports he is ambulating in the room independently. His last bowel movement was on 02/19/17. He denies any current use of illicit drugs and denies any marijuana use, He reports his last drink if alcohol was on Friday 02/17/17 (two beers).     PAIN HISTORY:   Location: abdomen  Duration: constant  Quality of the pain: dull pain  Aggravating factors: activity  Relieving factors : rest and the PCA helps somewhat today, not so much last night.     CAPA (Clinically Aligned Pain Assessment)  Comfort (How is your pain?): Tolerable with discomfort  Change in Pain (Since your last medication/intervention?): Getting better  Pain Control (How are your pain treatments working?): Partially effective pain control  Functioning (Are you able to do activities to get better?) :  Pain keeps me from doing most of what I need to do  Sleep (Does your pain management allow you to sleep or rest?): Awake with pain most of the night       FUNCTIONAL STATUS:  Change:      improving  Oral intake:     NPO and tolerating some oral meds with sips of water  Bowel function:    No bowel movements since 02/19/17  Activity level:     Up ambulating independently in room  Sleep:      Poor sleep last night due to pain  Mood:      flat      REVIEW OF 10 BODY SYSTEMS: 10 point ROS of systems including Constitutional, Eyes, Respiratory, Cardiovascular, Gastroenterology, Genitourinary, Integumentary, Musculoskeletal, Psychiatric were all negative except for pertinent positives noted in my HPI.       INPATIENT MEDICATIONS PERTINENT TO PAIN CONSULT:   Opiates  Hydromorphone pca 0.3mg IV q10min (used 16.4mg over ~ 12 hours)    Adjuvants / Co Analgesics  Lidoderm5% patch 1 patch topically q24h, 12 hr on, 12 hr off    Antidepressants / Antianxiety  None    Laxatives / Stool Softners  Senna S 1-2 tabs po bid    Other  Nicotine patch 21mg topically q24h  Quetiapine 100mg po qhs    PAST PAIN TREATMENTS:   --gabapentin- helps        CURRENT MEDICATIONS:   Current Facility-Administered Medications Ordered in Epic   Medication Dose Route Frequency Provider Last Rate Last Dose     sodium chloride (PF) 0.9% PF flush 3 mL  3 mL Intracatheter Q1H PRN Jaydon Diaz MD         sodium chloride (PF) 0.9% PF flush 3 mL  3 mL Intracatheter Q8H Jaydon Diaz MD   3 mL at 02/20/17 1906     naloxone (NARCAN) injection 0.1-0.4 mg  0.1-0.4 mg Intravenous Q2 Min PRN Jaydon Diaz MD         HYDROmorphone (DILAUDID) PCA 1 mg/mL   Intravenous PCA Jaydon Diaz MD         senna-docusate (SENOKOT-S;PERICOLACE) 8.6-50 MG per tablet 1-2 tablet  1-2 tablet Oral BID Whitney Dunn PA-C   2 tablet at 02/21/17 0754     bisacodyl (DULCOLAX) EC tablet 5-15 mg  5-15 mg Oral Daily PRN Whitney Dunn  JOEY Dos Santos         ondansetron (ZOFRAN-ODT) ODT tab 4 mg  4 mg Oral Q6H PRN Whitney Dunn PA-C        Or     ondansetron (ZOFRAN) injection 4 mg  4 mg Intravenous Q6H PRN Whitney Dunn PA-C         prochlorperazine (COMPAZINE) injection 5-10 mg  5-10 mg Intravenous Q6H PRN Whitney Dunn PA-C        Or     prochlorperazine (COMPAZINE) tablet 5-10 mg  5-10 mg Oral Q6H PRN Whitney Dunn PA-C        Or     prochlorperazine (COMPAZINE) Suppository 25 mg  25 mg Rectal Q12H PRN Whitney Dunn PA-C         lactated ringers infusion   Intravenous Continuous Whitney Dunn PA-C 150 mL/hr at 02/21/17 0935       nicotine Patch in Place   Transdermal Q8H hWitney Dunn PA-C         nicotine patch REMOVAL   Transdermal Daily Whitney Dunn PA-C   Stopped at 02/20/17 2146     nicotine (NICODERM CQ) 21 MG/24HR 24 hr patch 1 patch  1 patch Transdermal Daily Whitney Dunn PA-C   1 patch at 02/21/17 0754     QUEtiapine (SEROquel) tablet 100 mg  100 mg Oral At Bedtime Whitney Dunn PA-C   100 mg at 02/20/17 2218     lidocaine (LIDODERM) 5 % Patch 1 patch  1 patch Transdermal Q24h Whitney Dunn PA-C   1 patch at 02/21/17 0125    And     lidocaine (LIDODERM) patch REMOVAL   Transdermal Q24H Whitney Dunn PA-C        And     lidocaine (LIDODERM) Patch in Place   Transdermal Q8H Whitney Dunn PA-C         No current Epic-ordered outpatient prescriptions on file.           HOME/PREVIOUS MEDICATIONS:   Prior to Admission medications    Medication Sig Start Date End Date Taking? Authorizing Provider   buprenorphine HCl-naloxone HCl (SUBOXONE) 8-2 MG per film Place 0.5-1 Film under the tongue 3 times daily Dose is 8 mg-2 mg in morning and early afternoon, and 4 mg-1 mg at night   Yes Unknown, Entered By History   hydrOXYzine (ATARAX) 25 MG tablet Take 25-50 mg by mouth 2 times daily   Yes Unknown, Entered By History   omeprazole (PRILOSEC) 20  MG CR capsule Take 20 mg by mouth daily  1/8/17  Yes Reported, Patient   gabapentin (NEURONTIN) 300 MG capsule Take 3 capsules (900 mg) by mouth 3 times daily 1/25/17  Yes Amy Starkey MD   QUEtiapine (SEROQUEL) 50 MG tablet Take 1-2 tablets ( mg) by mouth nightly as needed  Patient taking differently: Take 100 mg by mouth At Bedtime  1/25/17  Yes Amy Starkey MD   metoprolol (TOPROL-XL) 50 MG 24 hr tablet Take 1 tablet (50 mg) by mouth daily 12/6/16  Yes Amy Starkey MD   busPIRone (BUSPAR) 15 MG tablet Take 15 mg by mouth 3 times daily   Yes Reported, Patient   multivitamin, therapeutic with minerals (THERA-VIT-M) TABS Take 1 tablet by mouth daily 8/22/16  Yes Gautam Jimenez MD   mirtazapine (REMERON) 7.5 MG TABS tablet Take 1 tablet (7.5 mg) by mouth At Bedtime 1/4/17   Amy Starkey MD   folic acid (FOLVITE) 1 MG tablet Take 1 tablet (1 mg) by mouth daily 11/9/16   Mickey Jang MD         ALLERGIES:    Allergies   Allergen Reactions     Trazodone Other (See Comments)     erection            PAST MEDICAL AND PSYCHIATRIC HISTORY:    Past Medical History   Diagnosis Date     Anxiety      Depressive disorder      Pancreatitis      Pseudocyst of pancreas      Substance abuse            PAST SURGICAL HISTORY:   Past Surgical History   Procedure Laterality Date     Endoscopic retrograde cholangiopancreatogram  4/9/2014     Procedure: ENDOSCOPIC RETROGRADE CHOLANGIOPANCREATOGRAM;   Endoscopic Ultrasound with Fine needle aspiration, EsophagoGastroDuodenoscopy with stent placement into pseudeocyst, balloon dilation.  ;  Surgeon: Jagjit Dow MD;  Location: [facility] OR     Esophagoscopy, gastroscopy, duodenoscopy (egd), combined  4/9/2014     Procedure: COMBINED ENDOSCOPIC ULTRASOUND, ESOPHAGOSCOPY, GASTROSCOPY, DUODENOSCOPY (EGD), FINE NEEDLE ASPIRATE/BIOPSY;;  Surgeon: Jagjit Dow MD;  Location: [facility] OR     Esophagoscopy, gastroscopy, duodenoscopy (egd),  combined  6/24/2014     Procedure: COMBINED ESOPHAGOSCOPY, GASTROSCOPY, DUODENOSCOPY (EGD);  Surgeon: Jagjit Dow MD;  Location:  OR           FAMILY HISTORY: family history includes Alcohol/Drug in his father and paternal grandfather; Hypertension in his mother; Other - See Comments in his brother; Substance Abuse in his brother and father. There is no history of DIABETES, CANCER, Unknown/Adopted, Depression, Anxiety Disorder, Schizophrenia, Bipolar Disorder, Suicide, Dementia, Berne Disease, Parkinsonism, Autism Spectrum Disorder, Intellectual Disability (Mental Retardation), or MENTAL ILLNESS.      HEALTH & LIFESTYLE PRACTICES:   Tobacco:  reports that he has been smoking Cigarettes.  He has been smoking about 1.00 pack per day. He has never used smokeless tobacco.  Alcohol:  reports that he does not drink alcohol.  Illicit drugs:  reports that he does not use illicit drugs.       SOCIAL HISTORY:  Lives in [place], [place]      LABORATORY VALUES:   Last Basic Metabolic Panel:  Lab Results   Component Value Date     02/21/2017      Lab Results   Component Value Date    POTASSIUM 3.6 02/21/2017     Lab Results   Component Value Date    CHLORIDE 106 02/21/2017     Lab Results   Component Value Date    ALEYDA 8.0 02/21/2017     Lab Results   Component Value Date    CO2 24 02/21/2017     Lab Results   Component Value Date    BUN 8 02/21/2017     Lab Results   Component Value Date    CR 0.90 02/21/2017     Lab Results   Component Value Date     02/21/2017       CBC results:  Lab Results   Component Value Date    WBC 6.5 02/21/2017     Lab Results   Component Value Date    HGB 12.4 02/21/2017     Lab Results   Component Value Date    HCT 35.7 02/21/2017     Lab Results   Component Value Date     02/21/2017       LFT results:  Lab Results   Component Value Date    AST 15 02/21/2017     Lab Results   Component Value Date    ALT 19 02/21/2017     Lab Results   Component Value Date    ALKPHOS 60  02/21/2017         Lab Results   Component Value Date    ALBUMIN 3.2 02/21/2017         Lab Results   Component Value Date    INR 1.07 02/20/2017         DIAGNOSTIC TESTS:   CT abdomen 02/20/17:  IMPRESSION:  1. New moderate acute edematous pancreatitis noted diffusely. The  enhancement pattern of the pancreatic parenchyma appears similar to  the expected parenchyma demonstrated on 11/8/2016. As such, no  convincing new areas of pancreatic necrosis are seen, though the  pancreas parenchyma appears atrophic. There is small peripancreatic  fluid.  2. No other acute finding.      Labs above reviewed as well as additional relevant diagnostic studies from the EPIC record.       PHYSICAL EXAMINATION:  VITAL SIGNS:  B/P: 127/80, T: 98, P: 79, R: 16    CONSTITUTIONAL/GENERAL APPEARANCE: Alert and Oriented x3  HEAD:Normocephalic  NECK: free range of motion  ENT: moist mucous membranes  EYES: PERRLA and Pupils 4mm  PULMONARY:non-labored  CHEST WALL:symetrical chest wall expansion  CARDIOVASCULAR:acyanotic and peripheral pulses +2 all extremities  ABDOMINAL:round, non-tender to light palpation, some increased tenderness to deeper palpation to the upper left and right quadrants. No increased pain with sit-up or straight leg raises  MUSCULOSKELETAL/BACK/SPINE/EXTREMITIES: gross motor function intact all extremities   GAIT: not assessed, patient resting in bed  NEURO:  SILT all extremities  SKIN:  normal, warm, dry, no rashes on exposed skin  PSYCHIATRIC/BEHAVIORAL/OBSERVATIONS:     Judgment/Insight - Intact   Orientation - Oriented x 3   Memory - intact   Mood and affect - flat, emotional at times during our visit related to frustration with abdominal pain.      TIME SPENT: 60 minutes including 45 minutes of face-to-face time counseling him  about his pain management treatment options, and coordinating care with the primary team.    Star Cardenas CNP  February 21, 2017, 11:50 AM  Inpatient Pain Management Service

## 2017-02-21 NOTE — PLAN OF CARE
Problem: Goal Outcome Summary  Goal: Goal Outcome Summary  Outcome: No Change  VSS on RA. A & O. Up ad laurie. Reports intolerable pain in abdomen that radiates to back. See previous notes. PCA pump continues with dose unchanged. Unhappy with pain control thus far. Will have pain consult sometime this AM. PIV hurting. New PIV placed. Will continue to monitor.

## 2017-02-21 NOTE — PLAN OF CARE
Problem: Goal Outcome Summary  Goal: Goal Outcome Summary  Outcome: No Change  VSS. A&Ox4. Up ad laurie. NPO. LR @ 150 ml/hr. Reporting abdominal pain; PCA 9.3 mg shift total. Continue to monitor.

## 2017-02-21 NOTE — PROGRESS NOTES
Care Coordinator Progress Note     Admission Date/Time:  2/20/2017  Attending MD:  Mickey Jang MD     Data  Chart reviewed, discussed with interdisciplinary team.   Patient was admitted for: Pancreatitis, recurrent (H).    Concerns with insurance coverage for discharge needs: None.  Current Living Situation: Patient lives with family.  Support System: Involved  Services Involved: none  Transportation: [place] transportation,  [employer] [facility] [phone]  Barriers to Discharge: chronically ill     Assessment  Frank is a 32 yo male with a hx of multiple admissions for pancreatitis rt ETOH use.  Per md team pt ETOH level was 0.03 upon admission, however pt denies any etoh use.  Pt sees MD at [facility]  Amy Starkey for both PCP and Pain management.  Per md note pt receiving suboxone for pain management out pt.  Per chart review pt has not needed HHC or agency placement.  RNCC will continue to follow pt through dc for any dc needs.      Plan  Dc plan pending medical clearance.       Mini Ansari, OTILIOCC, BSN    [facility]  [address] [place] [id number]    [email or web address]    Office: [phone] Pager: [phone]

## 2017-02-21 NOTE — PROGRESS NOTES
Progress Note  Date of Service: February 21, 2017    Frank Arguello MRN# [id number]   YOB: 1985 Age: 31 year old   Admit Date: 2/20/2017         Addition-  1) patient requesting regular meals, will adat.  2) switch pain meds to oral and ketorolac.    Assessment/Plan:  Frank Arguello is a 31 year old male with PMH of Etoh dependence, A/c Etoh pancreatitis, Etoh hepatitis, Adjustment disorder with anxiety, H/o of heroin abuse, Tobacco abuse disorder, opioid abuse/dependence s/p methadone treatment, currently on suboxone who presented to [place] ED with complaints of epigastric abdominal pain.      Acute Abdominal pain- likely 2/2 A/c Pancreatitis: Reports numerous episodes in past year. Most recently 11/7/17. Presented to  ED with complaints of acute onset epigastric abdominal pain with radiation to the back, and 2 episodes of non-bloody emesis. ED course revealed an elevated lactate-  LA initially 5.9 --> 2.5. Leukocytosis of 23.2, Lipase 660, Bicarb 16, AG 21. LFT's wnl. CT A/P with new moderate, acute, diffuse edematous pancreatitis. No new areas of necrosis seen. Small peripancreatic fluid. Drug screen positive for opiates. Etoh 0.03. Patient denies recent use. UA with ketones at 40 and Specific [place] 1.036. Patient received 2L NS fluid bolus and IV dilaudid. Pain not controled, so PCA ordered prior to Gobles arrival. Suspect that patient is experiencing pancreatitis flare with elevated lipase, CT results and epigastric abdominal pain with radiation to the back. Currently, LA 1.2, AG 8, WBC 14.1.   - advance diet  - LR at 150 cc/hr  - Repeat CMP, LA, CBC now and in am  - IV protonix       Opioid Dependence: Per chart review. Was previously maintained on Methadone, though reports more recently Suboxone ( last dose reported 3 days ago). Follows with Dr. Amy Ac for pain management. Appears that he may have had trouble with insurance coverage of higher dose and was planned to taper off  ( most recent clinic note 1/25/17). Utox positive for opiates and requiring large doses of dilaudid. Placed on PCA in [facility] ED.   - Cautious use of opiates in patient with hx as above  Etoh Dependence: Per chart review, patient with Etoh history since age of 16. Hx of 1.5 Liter/day, though denies use in pat 2-3 months. Etoh in ED 0.03- denies use.   - MSSA if patient with s/s w/d  - Continuous pulse ox     Anxiety/Depression: with adjustment disorder and hx of panic attacks. PTA Seroquel, Buspar, metoprolol, Gabapentin.  - Continue when tolerating PO intake     Tobacco Abuse Disorder: Current pack/day smoker.   - Nicotine patch  - Encourage cessation     Discussed with Dr. Patel.      FEN: NPO, LR  Prophylaxis: PPI, Mechanical  Code Status: Full Code    Subjective:  No new complaubts    Review of systems is otherwise negative.    Objective:  BP (!) 145/99 (BP Location: Left arm)  Pulse 74  Temp 98.5  F (36.9  C) (Oral)  Resp 16  Wt 98.4 kg (216 lb 14.4 oz)  SpO2 99%  BMI 27.11 kg/m2  Gen: alert and oriented, in NAD  Chest: CTAB, no crackles, no wheezes  Abdomen: soft, NT, ND, NABS  Extremities: distal pulses 2+, no edema noted    Labs:    Lab Results   Component Value Date    WBC 6.5 02/21/2017    HGB 12.4 (L) 02/21/2017    HCT 35.7 (L) 02/21/2017     (L) 02/21/2017     02/21/2017    POTASSIUM 3.6 02/21/2017    CHLORIDE 106 02/21/2017    CO2 24 02/21/2017    BUN 8 02/21/2017    CR 0.90 02/21/2017     (H) 02/21/2017    AST 15 02/21/2017    ALT 19 02/21/2017    GGT 29 08/20/2016    ALKPHOS 60 02/21/2017    BILITOTAL 1.1 02/21/2017    INR 1.07 02/20/2017        Meds    Current Facility-Administered Medications   Medication     sodium chloride (PF) 0.9% PF flush 3 mL     sodium chloride (PF) 0.9% PF flush 3 mL     naloxone (NARCAN) injection 0.1-0.4 mg     HYDROmorphone (DILAUDID) PCA 1 mg/mL     senna-docusate (SENOKOT-S;PERICOLACE) 8.6-50 MG per tablet 1-2 tablet     bisacodyl (DULCOLAX) EC tablet  5-15 mg     ondansetron (ZOFRAN-ODT) ODT tab 4 mg    Or     ondansetron (ZOFRAN) injection 4 mg     prochlorperazine (COMPAZINE) injection 5-10 mg    Or     prochlorperazine (COMPAZINE) tablet 5-10 mg    Or     prochlorperazine (COMPAZINE) Suppository 25 mg     lactated ringers infusion     nicotine Patch in Place     nicotine patch REMOVAL     nicotine (NICODERM CQ) 21 MG/24HR 24 hr patch 1 patch     QUEtiapine (SEROquel) tablet 100 mg     lidocaine (LIDODERM) 5 % Patch 1 patch    And     lidocaine (LIDODERM) patch REMOVAL    And     lidocaine (LIDODERM) Patch in Place         Imaging:  No new      Mickey Jang MD,MS  [phone]

## 2017-02-21 NOTE — PROVIDER NOTIFICATION
"Paged Gold team about pt's request for more pain medication. Reports that the PCA pump is not sufficient. Pain reported to be intolerable. Agreed to try lidocaine patch. \"I'll try anything at this point\", he stated. MD ordered one time dose of toradol.. Pt refusing essential oils, hot packs, etc. Will continue to monitor.   "

## 2017-02-22 VITALS
HEART RATE: 65 BPM | WEIGHT: 214.9 LBS | BODY MASS INDEX: 26.86 KG/M2 | TEMPERATURE: 98.1 F | RESPIRATION RATE: 18 BRPM | SYSTOLIC BLOOD PRESSURE: 162 MMHG | OXYGEN SATURATION: 100 % | DIASTOLIC BLOOD PRESSURE: 106 MMHG

## 2017-02-22 LAB
ALBUMIN SERPL-MCNC: 3.1 G/DL (ref 3.4–5)
ALP SERPL-CCNC: 61 U/L (ref 40–150)
ALT SERPL W P-5'-P-CCNC: 18 U/L (ref 0–70)
ANION GAP SERPL CALCULATED.3IONS-SCNC: 7 MMOL/L (ref 3–14)
AST SERPL W P-5'-P-CCNC: 15 U/L (ref 0–45)
BILIRUB SERPL-MCNC: 0.4 MG/DL (ref 0.2–1.3)
BUN SERPL-MCNC: 5 MG/DL (ref 7–30)
CALCIUM SERPL-MCNC: 8.2 MG/DL (ref 8.5–10.1)
CHLORIDE SERPL-SCNC: 107 MMOL/L (ref 94–109)
CO2 SERPL-SCNC: 27 MMOL/L (ref 20–32)
CREAT SERPL-MCNC: 0.71 MG/DL (ref 0.66–1.25)
ERYTHROCYTE [DISTWIDTH] IN BLOOD BY AUTOMATED COUNT: 13.3 % (ref 10–15)
GFR SERPL CREATININE-BSD FRML MDRD: ABNORMAL ML/MIN/1.7M2
GLUCOSE SERPL-MCNC: 103 MG/DL (ref 70–99)
HCT VFR BLD AUTO: 34.6 % (ref 40–53)
HGB BLD-MCNC: 12.1 G/DL (ref 13.3–17.7)
MCH RBC QN AUTO: 30.3 PG (ref 26.5–33)
MCHC RBC AUTO-ENTMCNC: 35 G/DL (ref 31.5–36.5)
MCV RBC AUTO: 87 FL (ref 78–100)
PLATELET # BLD AUTO: 123 10E9/L (ref 150–450)
POTASSIUM SERPL-SCNC: 3.7 MMOL/L (ref 3.4–5.3)
PROT SERPL-MCNC: 6.4 G/DL (ref 6.8–8.8)
RBC # BLD AUTO: 4 10E12/L (ref 4.4–5.9)
SODIUM SERPL-SCNC: 142 MMOL/L (ref 133–144)
WBC # BLD AUTO: 4 10E9/L (ref 4–11)

## 2017-02-22 PROCEDURE — 25000128 H RX IP 250 OP 636: Performed by: INTERNAL MEDICINE

## 2017-02-22 PROCEDURE — 25000132 ZZH RX MED GY IP 250 OP 250 PS 637: Performed by: INTERNAL MEDICINE

## 2017-02-22 PROCEDURE — 80053 COMPREHEN METABOLIC PANEL: CPT | Performed by: INTERNAL MEDICINE

## 2017-02-22 PROCEDURE — 36415 COLL VENOUS BLD VENIPUNCTURE: CPT | Performed by: INTERNAL MEDICINE

## 2017-02-22 PROCEDURE — 99238 HOSP IP/OBS DSCHRG MGMT 30/<: CPT | Performed by: INTERNAL MEDICINE

## 2017-02-22 PROCEDURE — 85027 COMPLETE CBC AUTOMATED: CPT | Performed by: INTERNAL MEDICINE

## 2017-02-22 PROCEDURE — 25800025 ZZH RX 258: Performed by: PHYSICIAN ASSISTANT

## 2017-02-22 PROCEDURE — 25000132 ZZH RX MED GY IP 250 OP 250 PS 637: Performed by: PHYSICIAN ASSISTANT

## 2017-02-22 RX ORDER — METOPROLOL SUCCINATE 50 MG/1
50 TABLET, EXTENDED RELEASE ORAL DAILY
Status: DISCONTINUED | OUTPATIENT
Start: 2017-02-22 | End: 2017-02-22 | Stop reason: HOSPADM

## 2017-02-22 RX ORDER — TRAMADOL HYDROCHLORIDE 50 MG/1
25 TABLET ORAL EVERY 6 HOURS PRN
Qty: 20 TABLET | Refills: 0 | Status: SHIPPED | OUTPATIENT
Start: 2017-02-22 | End: 2017-02-26

## 2017-02-22 RX ADMIN — HYDROMORPHONE HYDROCHLORIDE 2 MG: 2 TABLET ORAL at 01:24

## 2017-02-22 RX ADMIN — HYDROMORPHONE HYDROCHLORIDE 2 MG: 2 TABLET ORAL at 14:58

## 2017-02-22 RX ADMIN — HYDROMORPHONE HYDROCHLORIDE 2 MG: 2 TABLET ORAL at 04:17

## 2017-02-22 RX ADMIN — METOPROLOL SUCCINATE 50 MG: 50 TABLET, FILM COATED, EXTENDED RELEASE ORAL at 16:13

## 2017-02-22 RX ADMIN — HYDROMORPHONE HYDROCHLORIDE 2 MG: 2 TABLET ORAL at 10:52

## 2017-02-22 RX ADMIN — SODIUM CHLORIDE, POTASSIUM CHLORIDE, SODIUM LACTATE AND CALCIUM CHLORIDE: 600; 310; 30; 20 INJECTION, SOLUTION INTRAVENOUS at 12:06

## 2017-02-22 RX ADMIN — NICOTINE 1 PATCH: 21 PATCH, EXTENDED RELEASE TRANSDERMAL at 07:59

## 2017-02-22 RX ADMIN — KETOROLAC TROMETHAMINE 15 MG: 15 INJECTION, SOLUTION INTRAMUSCULAR; INTRAVENOUS at 02:39

## 2017-02-22 RX ADMIN — HYDROMORPHONE HYDROCHLORIDE 2 MG: 2 TABLET ORAL at 07:41

## 2017-02-22 RX ADMIN — KETOROLAC TROMETHAMINE 15 MG: 15 INJECTION, SOLUTION INTRAMUSCULAR; INTRAVENOUS at 09:23

## 2017-02-22 NOTE — PLAN OF CARE
Problem: Goal Outcome Summary  Goal: Goal Outcome Summary  Outcome: No Change  VSS with exception of elevated diastolic BP this AM. MD paged. AAOX4. Ind with cares. PIV running LR at 150cc/hr. C/o of abdominal pain; prn Toradol and Dilaudid given, partially effective per pt. Awake through out the night.  R: Continue to monitor pain status and implement POC.

## 2017-02-22 NOTE — PLAN OF CARE
Problem: Goal Outcome Summary  Goal: Goal Outcome Summary  Outcome: Improving  Pt on RA, diastolic BPs have been running in the low 100's. MD has been aware. Oral dilaudid given x 3 for abdominal pain, IV toradol given x 1. Nicotine patch applied to right arm. Pt independent, makes needs known. LR running at 150cc/hr through right PIV. Pt diastolic BP high, MD wanting to keep pt another night. Pt leaving AMA, form signed and placed in chart. Next nurse to give metoprolol dose before pt leaves.

## 2017-02-22 NOTE — DISCHARGE SUMMARY
Physician Discharge Summary     Patient ID:  Frank Arguello  [id number]  31 year old  1985    Admit date: 2/20/2017    Discharge date and time: 2/22/2017     Admitting Physician: Joby Patel MD     Discharge Physician: Mickey Jang    Admission Diagnoses: Pancreatitis, recurrent (H) [K86.1]    Discharge Diagnoses: Mild acute pancreatitis resolved    Admission Condition: stable    Discharged Condition: serious    Indication for Admission: Frank was admitted for acute pancreatitis and discharged    Hospital Course: Hydrated, made NPO transiently and pain treated with IV narcotics. On the second day patient felt better and was able to eat and tolerate an oral diet. Wanted to leave today. Counselled on ETOH (denied use)  2) Transient Htn: no evidence of EtOH withdrawal , rechecked prior to discharge, should be checked in a couple of day in clinic if the patients is abstinent from drinking.          Significant Diagnostic Studies:   Results for orders placed or performed during the hospital encounter of 02/20/17   CT Abdomen Pelvis w Contrast    Narrative    CT ABDOMEN AND PELVIS WITH CONTRAST   2/20/2017 2:46 PM     HISTORY: Pancreatitis, rule out hemorrhage or necrosis.    TECHNIQUE: CT abdomen and pelvis with 99 mL Isovue-370, 50 mL  Omnipaque 140 IV. Radiation dose for this scan was reduced using  automated exposure control, adjustment of the mA and/or kV according  to patient size, or iterative reconstruction technique.    COMPARISON: CT abdomen and pelvis 11/8/2016.    FINDINGS: There is diffuse inflammatory edema involving the pancreatic  parenchyma. The enhancement pattern of the pancreatic parenchyma  appears similar to the expected residual parenchyma when compared to  11/8/2016. There are no convincing new areas of necrosis identified,  though the pancreas is diffusely involved with edema that is new.  There is mild peripancreatic fluid that does not appear loculated at  this time.    The liver,  gallbladder, adrenals, spleen, and kidneys show no acute  findings. Unremarkable gallbladder. No acute bowel finding. No bowel  obstruction. No abscess is currently seen.      Impression    IMPRESSION:  1. New moderate acute edematous pancreatitis noted diffusely. The  enhancement pattern of the pancreatic parenchyma appears similar to  the expected parenchyma demonstrated on 11/8/2016. As such, no  convincing new areas of pancreatic necrosis are seen, though the  pancreas parenchyma appears atrophic. There is small peripancreatic  fluid.  2. No other acute finding.    ARUN MANZANARES MD         Treatments: IV hydration    Discharge Exam:  Alert oriented X 3  Abdomen soft NT  Lungs CTAB    Disposition: home    Patient Instructions:   Current Discharge Medication List      CONTINUE these medications which have NOT CHANGED    Details   buprenorphine HCl-naloxone HCl (SUBOXONE) 8-2 MG per film Place 0.5-1 Film under the tongue 3 times daily Dose is 8 mg-2 mg in morning and early afternoon, and 4 mg-1 mg at night      hydrOXYzine (ATARAX) 25 MG tablet Take 25-50 mg by mouth 2 times daily      omeprazole (PRILOSEC) 20 MG CR capsule Take 20 mg by mouth daily     Associated Diagnoses: Uncomplicated opioid dependence (H); Alcohol-induced acute pancreatitis without infection or necrosis; Chronic abdominal pain; Anxiety; Primary insomnia      gabapentin (NEURONTIN) 300 MG capsule Take 3 capsules (900 mg) by mouth 3 times daily  Qty: 270 capsule, Refills: 0    Associated Diagnoses: Chronic abdominal pain; Uncomplicated opioid dependence (H); Alcohol-induced acute pancreatitis without infection or necrosis; Anxiety; Primary insomnia      QUEtiapine (SEROQUEL) 50 MG tablet Take 1-2 tablets ( mg) by mouth nightly as needed  Qty: 30 tablet, Refills: 1    Associated Diagnoses: Primary insomnia; Uncomplicated opioid dependence (H); Alcohol-induced acute pancreatitis without infection or necrosis; Chronic abdominal pain; Anxiety       metoprolol (TOPROL-XL) 50 MG 24 hr tablet Take 1 tablet (50 mg) by mouth daily  Qty: 90 tablet, Refills: 3    Associated Diagnoses: Benign essential hypertension      busPIRone (BUSPAR) 15 MG tablet Take 15 mg by mouth 3 times daily      multivitamin, therapeutic with minerals (THERA-VIT-M) TABS Take 1 tablet by mouth daily  Qty: 30 each, Refills: 0    Associated Diagnoses: Alcohol dependence with intoxication with complication (H)      mirtazapine (REMERON) 7.5 MG TABS tablet Take 1 tablet (7.5 mg) by mouth At Bedtime  Qty: 30 tablet, Refills: 3      folic acid (FOLVITE) 1 MG tablet Take 1 tablet (1 mg) by mouth daily  Qty: 30 tablet, Refills: 11    Associated Diagnoses: Alcohol dependence with intoxication with complication (H)           Activity: activity as tolerated  Diet: regular diet  Wound Care: none needed    Follow-up with PCP in 5-7 days    Signed:  Mickey Jang  2/22/2017  12:55 PM

## 2017-02-22 NOTE — PROGRESS NOTES
Pt discharged at 1615 with own ride.  PIV removed. All belongings sent with pt. Discharge instructions reviewed and AMA papers signed with no further questions.

## 2017-02-22 NOTE — PLAN OF CARE
Problem: Goal Outcome Summary  Goal: Goal Outcome Summary  Outcome: No Change  VSS.  Pain controlled with PO dilaudid Q3 hours.  Tolerating regular diet, good appetite. Voiding adequately.  Continue to monitor per POC.

## 2017-02-23 ENCOUNTER — CARE COORDINATION (OUTPATIENT)
Dept: CARDIOLOGY | Facility: CLINIC | Age: 32
End: 2017-02-23

## 2017-02-23 ENCOUNTER — TELEPHONE (OUTPATIENT)
Dept: FAMILY MEDICINE | Facility: CLINIC | Age: 32
End: 2017-02-23

## 2017-02-23 NOTE — TELEPHONE ENCOUNTER
ED / Discharge Outreach Protocol    Patient Contact    Attempt # 1    Was call answered?  No.  Left message on voicemail with information to call me back.    Herbert Montelongo RN

## 2017-02-24 NOTE — TELEPHONE ENCOUNTER
"Hospital/TCU/ED for chronic condition Discharge Protocol    \"Hi, my name is Marcia Ladd, a registered nurse, and I am calling from [facility].  I am calling to follow up and see how things are going for you after your recent emergency visit/hospital/TCU stay.\"    Tell me how you are doing now that you are home?\" Fine, I am feeling better, not completely better, but better.       Discharge Instructions    \"Let's review your discharge instructions.  What is/are the follow-up recommendations?  Pt. Response: No changes.     \"Has an appointment with your primary care provider been scheduled?\"   Yes. (confirm)    \"When you see the provider, I would recommend that you bring your medications with you.\"    Medications    \"Tell me what changed about your medicines when you discharged?\"    Changes to chronic meds?    0-1    \"What questions do you have about your medications?\"    None     New diagnoses of heart failure, COPD, diabetes, or MI?    No              Medication reconciliation completed? Yes  Was MTM referral placed (*Make sure to put transitions as reason for referral)?   No    Call Summary    \"What questions or concerns do you have about your recent visit and your follow-up care?\"     none    \"If you have questions or things don't continue to improve, we encourage you contact us through the main clinic number (give number).  Even if the clinic is not open, triage nurses are available 24/7 to help you.     We would like you to know that our clinic has extended hours (provide information).  We also have urgent care (provide details on closest location and hours/contact info)\"      \"Thank you for your time and take care!\"  Marcia Ladd RN              "

## 2017-03-07 ENCOUNTER — COMMUNICATION - HEALTHEAST (OUTPATIENT)
Dept: FAMILY MEDICINE | Facility: CLINIC | Age: 32
End: 2017-03-07

## 2017-03-07 DIAGNOSIS — K86.0 ALCOHOL-INDUCED CHRONIC PANCREATITIS (H): ICD-10-CM

## 2017-04-01 DIAGNOSIS — K85.20 ALCOHOL-INDUCED ACUTE PANCREATITIS WITHOUT INFECTION OR NECROSIS: ICD-10-CM

## 2017-04-01 DIAGNOSIS — F41.9 ANXIETY: ICD-10-CM

## 2017-04-01 DIAGNOSIS — F11.20 UNCOMPLICATED OPIOID DEPENDENCE (H): ICD-10-CM

## 2017-04-01 DIAGNOSIS — F51.01 PRIMARY INSOMNIA: ICD-10-CM

## 2017-04-01 DIAGNOSIS — R10.9 CHRONIC ABDOMINAL PAIN: ICD-10-CM

## 2017-04-01 DIAGNOSIS — G89.29 CHRONIC ABDOMINAL PAIN: ICD-10-CM

## 2017-04-03 RX ORDER — QUETIAPINE FUMARATE 50 MG/1
TABLET, FILM COATED ORAL
Qty: 30 TABLET | Refills: 1 | Status: SHIPPED | OUTPATIENT
Start: 2017-04-03 | End: 2017-06-12

## 2017-04-03 NOTE — TELEPHONE ENCOUNTER
Prescription approved per [facility] Refill Protocol.    Marissa Singer, MARCIAN, RN  [facility]

## 2017-05-11 ENCOUNTER — TELEPHONE (OUTPATIENT)
Dept: FAMILY MEDICINE | Facility: CLINIC | Age: 32
End: 2017-05-11

## 2017-05-11 NOTE — TELEPHONE ENCOUNTER
Panel Management Review      Patient has the following on his problem list:     Hypertension   Last three blood pressure readings:  BP Readings from Last 3 Encounters:   02/22/17 (!) 162/106   01/25/17 (!) 134/98   01/04/17 (!) 138/94     Blood pressure: FAILED    HTN Guidelines:  Age 18-59 BP range:  Less than 140/90  Age 60-85 with Diabetes:  Less than 140/90  Age 60-85 without Diabetes:  less than 150/90      Composite cancer screening  Chart review shows that this patient is due/due soon for the following None  Summary:    Patient is due/failing the following:   BP CHECK    Action needed:   Patient needs office visit for Med check, bp check.    Type of outreach:    Will contact patient    Questions for provider review:    None                                                                                                                                    Gail Mosher,    Care Management Coordinator   [facility]             Chart routed to Care Team .

## 2017-05-30 ENCOUNTER — AMBULATORY - RIVER FALLS (OUTPATIENT)
Dept: FAMILY MEDICINE | Facility: CLINIC | Age: 32
End: 2017-05-30

## 2017-06-27 DIAGNOSIS — R10.9 CHRONIC ABDOMINAL PAIN: ICD-10-CM

## 2017-06-27 DIAGNOSIS — K85.20 ALCOHOL-INDUCED ACUTE PANCREATITIS WITHOUT INFECTION OR NECROSIS: ICD-10-CM

## 2017-06-27 DIAGNOSIS — G89.29 CHRONIC ABDOMINAL PAIN: ICD-10-CM

## 2017-06-27 DIAGNOSIS — F11.20 UNCOMPLICATED OPIOID DEPENDENCE (H): ICD-10-CM

## 2017-06-27 DIAGNOSIS — F51.01 PRIMARY INSOMNIA: ICD-10-CM

## 2017-06-27 DIAGNOSIS — F41.9 ANXIETY: ICD-10-CM

## 2017-06-27 RX ORDER — QUETIAPINE FUMARATE 50 MG/1
TABLET, FILM COATED ORAL
Qty: 30 TABLET | Refills: 0 | Status: SHIPPED | OUTPATIENT
Start: 2017-06-27 | End: 2017-07-09

## 2017-06-27 NOTE — TELEPHONE ENCOUNTER
Refilled until office visit on 7/11/17    QUEtiapine (SEROQUEL) 50 MG tablet    Last Written Prescription Date: 6/13/17  Last Fill Quantity: 30, # refills: 0  Last Office Visit with G, P or [facility] prescribing provider: 1/25/17  Next 5 appointments (look out 90 days)     Jul 11, 2017  2:30 PM CDT   Return Visit with Amy Starkey MD   [facility]    [address]  [address]  [facility] [address]   [phone]            Jul 11, 2017  2:30 PM CDT   Return Visit with SARANYA Ogden   [facility] ([facility]    [address]  [address]  [facility] [address]   [phone]                   BP Readings from Last 3 Encounters:   02/22/17 (!) 162/106   01/25/17 (!) 134/98   01/04/17 (!) 138/94     Pulse Readings from Last 2 Encounters:   02/22/17 65   01/25/17 111     Lab Results   Component Value Date     02/22/2017     Lab Results   Component Value Date    WBC 4.0 02/22/2017     Lab Results   Component Value Date    RBC 4.00 02/22/2017     Lab Results   Component Value Date    HGB 12.1 02/22/2017     Lab Results   Component Value Date    HCT 34.6 02/22/2017     No components found for: MCT  Lab Results   Component Value Date    MCV 87 02/22/2017     Lab Results   Component Value Date    MCH 30.3 02/22/2017     Lab Results   Component Value Date    MCHC 35.0 02/22/2017     Lab Results   Component Value Date    RDW 13.3 02/22/2017     Lab Results   Component Value Date     02/22/2017     Lab Results   Component Value Date    CHOL 121 05/30/2013     Lab Results   Component Value Date    HDL 46 05/30/2013     Lab Results   Component Value Date    LDL 63 05/30/2013     Lab Results   Component Value Date    TRIG 63 05/30/2013     Lab Results   Component Value Date    CHOLHDLRATIO 2.6 05/30/2013     Herbert Montelongo RN

## 2017-07-22 DIAGNOSIS — K85.20 ALCOHOL-INDUCED ACUTE PANCREATITIS WITHOUT INFECTION OR NECROSIS: ICD-10-CM

## 2017-07-22 DIAGNOSIS — F41.9 ANXIETY: ICD-10-CM

## 2017-07-22 DIAGNOSIS — G89.29 CHRONIC ABDOMINAL PAIN: ICD-10-CM

## 2017-07-22 DIAGNOSIS — R10.9 CHRONIC ABDOMINAL PAIN: ICD-10-CM

## 2017-07-22 DIAGNOSIS — F51.01 PRIMARY INSOMNIA: ICD-10-CM

## 2017-07-22 DIAGNOSIS — F11.20 UNCOMPLICATED OPIOID DEPENDENCE (H): ICD-10-CM

## 2017-07-24 RX ORDER — QUETIAPINE FUMARATE 50 MG/1
TABLET, FILM COATED ORAL
Qty: 30 TABLET | Refills: 0 | Status: SHIPPED | OUTPATIENT
Start: 2017-07-24 | End: 2021-06-10

## 2017-07-26 DIAGNOSIS — K85.20 ALCOHOL-INDUCED ACUTE PANCREATITIS WITHOUT INFECTION OR NECROSIS: ICD-10-CM

## 2017-07-26 DIAGNOSIS — R10.9 CHRONIC ABDOMINAL PAIN: ICD-10-CM

## 2017-07-26 DIAGNOSIS — F11.20 UNCOMPLICATED OPIOID DEPENDENCE (H): ICD-10-CM

## 2017-07-26 DIAGNOSIS — F41.9 ANXIETY: ICD-10-CM

## 2017-07-26 DIAGNOSIS — F51.01 PRIMARY INSOMNIA: ICD-10-CM

## 2017-07-26 DIAGNOSIS — G89.29 CHRONIC ABDOMINAL PAIN: ICD-10-CM

## 2017-07-27 RX ORDER — GABAPENTIN 300 MG/1
CAPSULE ORAL
Qty: 126 CAPSULE | Refills: 0 | OUTPATIENT
Start: 2017-07-27

## 2017-07-27 NOTE — TELEPHONE ENCOUNTER
Called patient to inform he needs appt to receive any further refills and assist with scheduling appt. No answer, left VM requesting call back.  Marcia Ladd RN

## 2017-08-05 DIAGNOSIS — G47.00 INSOMNIA, UNSPECIFIED TYPE: Primary | ICD-10-CM

## 2017-08-06 ENCOUNTER — COMMUNICATION - HEALTHEAST (OUTPATIENT)
Dept: FAMILY MEDICINE | Facility: CLINIC | Age: 32
End: 2017-08-06

## 2017-08-07 RX ORDER — MIRTAZAPINE 7.5 MG/1
TABLET, FILM COATED ORAL
Qty: 30 TABLET | Refills: 0 | Status: SHIPPED | OUTPATIENT
Start: 2017-08-07 | End: 2018-05-29

## 2017-08-08 ENCOUNTER — COMMUNICATION - HEALTHEAST (OUTPATIENT)
Dept: FAMILY MEDICINE | Facility: CLINIC | Age: 32
End: 2017-08-08

## 2017-08-08 DIAGNOSIS — K86.0 ALCOHOL-INDUCED CHRONIC PANCREATITIS (H): ICD-10-CM

## 2017-10-19 ENCOUNTER — TELEPHONE (OUTPATIENT)
Dept: FAMILY MEDICINE | Facility: CLINIC | Age: 32
End: 2017-10-19

## 2017-10-19 NOTE — TELEPHONE ENCOUNTER
Writer attempted to contact pt no answer, left VM for pt to all back clinic for an appt.      Faisal Ceballos

## 2017-10-19 NOTE — TELEPHONE ENCOUNTER
Panel Management Review      Patient has the following on his problem list:     Hypertension   Last three blood pressure readings:  BP Readings from Last 3 Encounters:   02/22/17 (!) 162/106   01/25/17 (!) 134/98   01/04/17 (!) 138/94     Blood pressure: FAILED    HTN Guidelines:  Age 18-59 BP range:  Less than 140/90  Age 60-85 with Diabetes:  Less than 140/90  Age 60-85 without Diabetes:  less than 150/90        Composite cancer screening  Chart review shows that this patient is due/due soon for the following None  Summary:    Patient is due/failing the following:   BP CHECK    Action needed:   Patient needs office visit for BP CHECK.    Type of outreach:    Will contact patient    Questions for provider review:    None                                                                                                                                    Gail Mosher,    Care Management Coordinator   [facility]             Chart routed to Care Team .

## 2017-10-23 NOTE — TELEPHONE ENCOUNTER
Second attempted to contact pt no answer, Left VM for pt to call back clinic.        Faisal Ceballos

## 2017-10-25 NOTE — TELEPHONE ENCOUNTER
Writer made last attempt to contact pt no answer, LVM for pt to call back clinic for an appt.        Faisal Ceballos

## 2018-02-28 ENCOUNTER — OFFICE VISIT - HEALTHEAST (OUTPATIENT)
Dept: FAMILY MEDICINE | Facility: CLINIC | Age: 33
End: 2018-02-28

## 2018-02-28 ENCOUNTER — RECORDS - HEALTHEAST (OUTPATIENT)
Dept: GENERAL RADIOLOGY | Facility: CLINIC | Age: 33
End: 2018-02-28

## 2018-02-28 DIAGNOSIS — M25.552 HIP PAIN, ACUTE, LEFT: ICD-10-CM

## 2018-02-28 DIAGNOSIS — R07.81 RIB PAIN ON LEFT SIDE: ICD-10-CM
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
## 2018-02-28 DIAGNOSIS — M25.552 PAIN IN LEFT HIP: ICD-10-CM

## 2018-02-28 DIAGNOSIS — R07.81 PLEURODYNIA: ICD-10-CM

## 2018-03-06 ENCOUNTER — COMMUNICATION - HEALTHEAST (OUTPATIENT)
Dept: FAMILY MEDICINE | Facility: CLINIC | Age: 33
End: 2018-03-06

## 2018-05-29 ENCOUNTER — HOSPITAL ENCOUNTER (EMERGENCY)
Facility: CLINIC | Age: 33
Discharge: HOME | End: 2018-05-30
Attending: EMERGENCY MEDICINE | Admitting: EMERGENCY MEDICINE
Payer: COMMERCIAL

## 2018-05-29 DIAGNOSIS — R10.13 ABDOMINAL PAIN, EPIGASTRIC: ICD-10-CM

## 2018-05-29 LAB
BASOPHILS # BLD AUTO: 0 10E9/L (ref 0–0.2)
BASOPHILS NFR BLD AUTO: 0.2 %
DIFFERENTIAL METHOD BLD: NORMAL
EOSINOPHIL # BLD AUTO: 0.1 10E9/L (ref 0–0.7)
EOSINOPHIL NFR BLD AUTO: 1.2 %
ERYTHROCYTE [DISTWIDTH] IN BLOOD BY AUTOMATED COUNT: 12 % (ref 10–15)
HCT VFR BLD AUTO: 42.7 % (ref 40–53)
HGB BLD-MCNC: 14.9 G/DL (ref 13.3–17.7)
IMM GRANULOCYTES # BLD: 0 10E9/L (ref 0–0.4)
IMM GRANULOCYTES NFR BLD: 0.2 %
LYMPHOCYTES # BLD AUTO: 2.4 10E9/L (ref 0.8–5.3)
LYMPHOCYTES NFR BLD AUTO: 27.3 %
MCH RBC QN AUTO: 31.4 PG (ref 26.5–33)
MCHC RBC AUTO-ENTMCNC: 34.9 G/DL (ref 31.5–36.5)
MCV RBC AUTO: 90 FL (ref 78–100)
MONOCYTES # BLD AUTO: 1.1 10E9/L (ref 0–1.3)
MONOCYTES NFR BLD AUTO: 12.3 %
NEUTROPHILS # BLD AUTO: 5.1 10E9/L (ref 1.6–8.3)
NEUTROPHILS NFR BLD AUTO: 58.8 %
NRBC # BLD AUTO: 0 10*3/UL
NRBC BLD AUTO-RTO: 0 /100
PLATELET # BLD AUTO: 194 10E9/L (ref 150–450)
RBC # BLD AUTO: 4.74 10E12/L (ref 4.4–5.9)
WBC # BLD AUTO: 8.6 10E9/L (ref 4–11)

## 2018-05-29 PROCEDURE — 83690 ASSAY OF LIPASE: CPT | Performed by: EMERGENCY MEDICINE

## 2018-05-29 PROCEDURE — 99285 EMERGENCY DEPT VISIT HI MDM: CPT | Mod: Z6 | Performed by: EMERGENCY MEDICINE

## 2018-05-29 PROCEDURE — 96361 HYDRATE IV INFUSION ADD-ON: CPT | Performed by: EMERGENCY MEDICINE

## 2018-05-29 PROCEDURE — 96374 THER/PROPH/DIAG INJ IV PUSH: CPT | Performed by: EMERGENCY MEDICINE

## 2018-05-29 PROCEDURE — 85025 COMPLETE CBC W/AUTO DIFF WBC: CPT | Performed by: EMERGENCY MEDICINE

## 2018-05-29 PROCEDURE — 25000128 H RX IP 250 OP 636: Performed by: EMERGENCY MEDICINE

## 2018-05-29 PROCEDURE — 99285 EMERGENCY DEPT VISIT HI MDM: CPT | Mod: 25 | Performed by: EMERGENCY MEDICINE

## 2018-05-29 PROCEDURE — 80053 COMPREHEN METABOLIC PANEL: CPT | Performed by: EMERGENCY MEDICINE

## 2018-05-29 RX ORDER — KETOROLAC TROMETHAMINE 30 MG/ML
30 INJECTION, SOLUTION INTRAMUSCULAR; INTRAVENOUS ONCE
Status: COMPLETED | OUTPATIENT
Start: 2018-05-29 | End: 2018-05-29

## 2018-05-29 RX ORDER — SODIUM CHLORIDE 9 MG/ML
1000 INJECTION, SOLUTION INTRAVENOUS CONTINUOUS
Status: DISCONTINUED | OUTPATIENT
Start: 2018-05-29 | End: 2018-05-30 | Stop reason: HOSPADM

## 2018-05-29 RX ORDER — MORPHINE SULFATE 4 MG/ML
4 INJECTION, SOLUTION INTRAMUSCULAR; INTRAVENOUS
Status: COMPLETED | OUTPATIENT
Start: 2018-05-29 | End: 2018-05-30

## 2018-05-29 RX ADMIN — KETOROLAC TROMETHAMINE 30 MG: 30 INJECTION, SOLUTION INTRAMUSCULAR at 23:54

## 2018-05-29 RX ADMIN — SODIUM CHLORIDE 1000 ML: 9 INJECTION, SOLUTION INTRAVENOUS at 23:54

## 2018-05-29 NOTE — ED AVS SNAPSHOT
[facility], Emergency Department    [address]    [facility] [address]    Phone:  [phone]    Fax:  [phone]                                       Frank Arguello   MRN: [id number]    Department:  [facility], Emergency Department   Date of Visit:  5/29/2018           After Visit Summary Signature Page     I have received my discharge instructions, and my questions have been answered. I have discussed any challenges I see with this plan with the nurse or doctor.    ..........................................................................................................................................  Patient/Patient Representative Signature      ..........................................................................................................................................  Patient Representative Print Name and Relationship to Patient    ..................................................               ................................................  Date                                            Time    ..........................................................................................................................................  Reviewed by Signature/Title    ...................................................              ..............................................  Date                                                            Time

## 2018-05-29 NOTE — ED AVS SNAPSHOT
[facility], Emergency Department    [address]    [place] [address]    Phone:  [phone]    Fax:  [phone]                                       Frank Arguello   MRN: [id number]    Department:  [facility], Emergency Department   Date of Visit:  5/29/2018           Patient Information     Date Of Birth          1985        Your diagnoses for this visit were:     Abdominal pain, epigastric        You were seen by Justin Cifuentes MD.        Discharge Instructions       You should have a primary care provider, see below  Please make an appointment to follow up with [facility] (phone: [phone]) as soon as possible.  Please make an appointment to follow up with GI Clinic (phone: [phone]) as soon as possible.    24 Hour Appointment Hotline       To make an appointment at any [facility] clinic, call [phone] ([phone]). If you don't have a family doctor or clinic, we will help you find one. [facility] clinics are conveniently located to serve the needs of you and your family.             Review of your medicines      Our records show that you are taking the medicines listed below. If these are incorrect, please call your family doctor or clinic.        Dose / Directions Last dose taken    gabapentin 300 MG capsule   Commonly known as:  NEURONTIN   Quantity:  126 capsule        TAKE 3 CAPSULES BY MOUTH 3 TIMES DAILY   Refills:  0        metoprolol succinate 50 MG 24 hr tablet   Commonly known as:  TOPROL-XL   Dose:  50 mg   Quantity:  90 tablet        Take 1 tablet (50 mg) by mouth daily   Refills:  3        multivitamin, therapeutic with minerals Tabs tablet   Dose:  1 tablet   Quantity:  30 each        Take 1 tablet by mouth daily   Refills:  0        omeprazole 20 MG CR capsule   Commonly known as:  priLOSEC   Dose:  20 mg        Take 20 mg by mouth daily   Refills:  0        QUEtiapine 50 MG tablet   Commonly known as:  SEROquel   Quantity:   30 tablet        TAKE 1 TO 2 TABLETS BY MOUTH ONCE NIGHTLY AS NEEDED   Refills:  0                Procedures and tests performed during your visit     CBC with platelets differential    Comprehensive metabolic panel    Lipase      Orders Needing Specimen Collection     None      Pending Results     No orders found for last 3 day(s).            Pending Culture Results     No orders found for last 3 day(s).            Pending Results Instructions     If you had any lab results that were not finalized at the time of your Discharge, you can call the ED Lab Result RN at [phone]. You will be contacted by this team for any positive Lab results or changes in treatment. The nurses are available 7 days a week from 10A to 6:30P.  You can leave a message 24 hours per day and they will return your call.        Thank you for choosing [facility]       Thank you for choosing [facility] for your care. Our goal is always to provide you with excellent care. Hearing back from our patients is one way we can continue to improve our services. Please take a few minutes to complete the written survey that you may receive in the mail after you visit with us. Thank you!        Cancer Prevention [employer] Information     [employer] gives you secure access to your electronic health record. If you see a primary care provider, you can also send messages to your care team and make appointments. If you have questions, please call your primary care clinic.  If you do not have a primary care provider, please call [phone] and they will assist you.        Care EveryWhere ID     This is your Care EveryWhere ID. This could be used by other organizations to access your [facility] medical records  [id number]        Equal Access to Services     CORINA OMALLEY : Marco Chacon, claude santoyo, sanket guidoalchevy honeycutt. So [facility] [phone].    ATENCIÓN: Si habla español, tiene a mckeon disposición servicios gratuitos de  asistencia lingüística. Charles al [phone].    We comply with applicable federal civil rights laws and [place] laws. We do not discriminate on the basis of race, color, national origin, age, disability, sex, sexual orientation, or gender identity.            After Visit Summary       This is your record. Keep this with you and show to your community pharmacist(s) and doctor(s) at your next visit.

## 2018-05-30 VITALS
BODY MASS INDEX: 26.5 KG/M2 | TEMPERATURE: 98 F | OXYGEN SATURATION: 99 % | SYSTOLIC BLOOD PRESSURE: 133 MMHG | WEIGHT: 213.1 LBS | RESPIRATION RATE: 18 BRPM | HEIGHT: 75 IN | HEART RATE: 75 BPM | DIASTOLIC BLOOD PRESSURE: 106 MMHG

## 2018-05-30 LAB
ALBUMIN SERPL-MCNC: 4 G/DL (ref 3.4–5)
ALP SERPL-CCNC: 56 U/L (ref 40–150)
ALT SERPL W P-5'-P-CCNC: 30 U/L (ref 0–70)
ANION GAP SERPL CALCULATED.3IONS-SCNC: 12 MMOL/L (ref 3–14)
AST SERPL W P-5'-P-CCNC: 24 U/L (ref 0–45)
BILIRUB SERPL-MCNC: 0.4 MG/DL (ref 0.2–1.3)
BUN SERPL-MCNC: 15 MG/DL (ref 7–30)
CALCIUM SERPL-MCNC: 8.8 MG/DL (ref 8.5–10.1)
CHLORIDE SERPL-SCNC: 105 MMOL/L (ref 94–109)
CO2 SERPL-SCNC: 24 MMOL/L (ref 20–32)
CREAT SERPL-MCNC: 0.89 MG/DL (ref 0.66–1.25)
GFR SERPL CREATININE-BSD FRML MDRD: >90 ML/MIN/1.7M2
GLUCOSE SERPL-MCNC: 118 MG/DL (ref 70–99)
LIPASE SERPL-CCNC: 471 U/L (ref 73–393)
POTASSIUM SERPL-SCNC: 3.6 MMOL/L (ref 3.4–5.3)
PROT SERPL-MCNC: 8.1 G/DL (ref 6.8–8.8)
SODIUM SERPL-SCNC: 141 MMOL/L (ref 133–144)

## 2018-05-30 PROCEDURE — 96376 TX/PRO/DX INJ SAME DRUG ADON: CPT | Performed by: EMERGENCY MEDICINE

## 2018-05-30 PROCEDURE — 96375 TX/PRO/DX INJ NEW DRUG ADDON: CPT | Performed by: EMERGENCY MEDICINE

## 2018-05-30 PROCEDURE — 25000128 H RX IP 250 OP 636: Performed by: EMERGENCY MEDICINE

## 2018-05-30 RX ORDER — SODIUM CHLORIDE 9 MG/ML
INJECTION, SOLUTION INTRAVENOUS CONTINUOUS
Status: DISCONTINUED | OUTPATIENT
Start: 2018-05-30 | End: 2018-05-30 | Stop reason: HOSPADM

## 2018-05-30 RX ORDER — HYDROMORPHONE HYDROCHLORIDE 1 MG/ML
0.5 INJECTION, SOLUTION INTRAMUSCULAR; INTRAVENOUS; SUBCUTANEOUS
Status: COMPLETED | OUTPATIENT
Start: 2018-05-30 | End: 2018-05-30

## 2018-05-30 RX ADMIN — MORPHINE SULFATE 4 MG: 4 INJECTION, SOLUTION INTRAMUSCULAR; INTRAVENOUS at 01:08

## 2018-05-30 RX ADMIN — HYDROMORPHONE HYDROCHLORIDE 0.5 MG: 1 INJECTION, SOLUTION INTRAMUSCULAR; INTRAVENOUS; SUBCUTANEOUS at 01:35

## 2018-05-30 RX ADMIN — MORPHINE SULFATE 4 MG: 4 INJECTION, SOLUTION INTRAMUSCULAR; INTRAVENOUS at 00:25

## 2018-05-30 RX ADMIN — MORPHINE SULFATE 4 MG: 4 INJECTION, SOLUTION INTRAMUSCULAR; INTRAVENOUS at 00:00

## 2018-05-30 ASSESSMENT — ENCOUNTER SYMPTOMS
CARDIOVASCULAR NEGATIVE: 1
DIARRHEA: 0
CONSTIPATION: 0
VOMITING: 1
ABDOMINAL PAIN: 1
ABDOMINAL DISTENTION: 0
BLOOD IN STOOL: 0
FEVER: 0
RESPIRATORY NEGATIVE: 1

## 2018-05-30 NOTE — ED TRIAGE NOTES
Upper abdominal pain started yesterday at 0200. Increasing pain to now severe. Hx pancreatitis with stent placement about 1 year ago the last time.

## 2018-05-30 NOTE — ED PROVIDER NOTES
History     Chief Complaint   Patient presents with     Abdominal Pain     Upper abdominal pain started yesterday at 0200. Increasing pain to now severe. Hx pancreatitis with stent placement about 1 year ago the last time.      HPI  Frank Arguello is a 33 year old male with a history of acute pancreatitis, polysubstance abuse, hepatic steatosis, anxiety, and chronic abdominal pain who presents to the Emergency Department today with the chief complaint of abdominal pain. Patient reports his pain started yesterday at approximately 1:00 am; he describes the pain in the upper quadrants of his abdomen and radiates to his back. He states the pain he is experiencing is similar to previous episodes of acute pancreatitis. His last episode of pancreatitis was approximately 10 months ago. He reports having dark but not black stool and vomiting with no sign of blood. He has consistently high lipases. Patient has had a history of a pancreatic stent. He does not have an established primary care physician.     I have reviewed the Medications, Allergies, Past Medical and Surgical History, and Social History in the [employer] system.  Past Medical History:   Diagnosis Date     Anxiety      Depressive disorder      Pancreatitis      Pseudocyst of pancreas      Substance abuse        Past Surgical History:   Procedure Laterality Date     ENDOSCOPIC RETROGRADE CHOLANGIOPANCREATOGRAM  4/9/2014    Procedure: ENDOSCOPIC RETROGRADE CHOLANGIOPANCREATOGRAM;   Endoscopic Ultrasound with Fine needle aspiration, EsophagoGastroDuodenoscopy with stent placement into pseudeocyst, balloon dilation.  ;  Surgeon: Jagjit Dow MD;  Location: [facility] OR     ESOPHAGOSCOPY, GASTROSCOPY, DUODENOSCOPY (EGD), COMBINED  4/9/2014    Procedure: COMBINED ENDOSCOPIC ULTRASOUND, ESOPHAGOSCOPY, GASTROSCOPY, DUODENOSCOPY (EGD), FINE NEEDLE ASPIRATE/BIOPSY;;  Surgeon: Jagjit Dow MD;  Location: [facility] OR     ESOPHAGOSCOPY, GASTROSCOPY, DUODENOSCOPY (EGD),  COMBINED  2014    Procedure: COMBINED ESOPHAGOSCOPY, GASTROSCOPY, DUODENOSCOPY (EGD);  Surgeon: Jagjit Dow MD;  Location:  OR       Family History   Problem Relation Age of Onset     Alcohol/Drug Paternal Grandfather      Alcohol/Drug Father      Substance Abuse Father      Hypertension Mother      DIABETES No family hx of      CANCER No family hx of      Unknown/Adopted No family hx of      Depression No family hx of      Anxiety Disorder No family hx of      Schizophrenia No family hx of      Bipolar Disorder No family hx of      Suicide No family hx of      Dementia No family hx of      [place] Disease No family hx of      Parkinsonism No family hx of      Autism Spectrum Disorder No family hx of      Intellectual Disability (Mental Retardation) No family hx of      MENTAL ILLNESS No family hx of      Other - See Comments Brother       of heroin overdose     Substance Abuse Brother        Social History   Substance Use Topics     Smoking status: Current Every Day Smoker     Packs/day: 1.00     Types: Cigarettes     Smokeless tobacco: Never Used     Alcohol use No      Comment: Used to but quit       No current facility-administered medications for this encounter.      Current Outpatient Prescriptions   Medication     metoprolol (TOPROL-XL) 50 MG 24 hr tablet     multivitamin, therapeutic with minerals (THERA-VIT-M) TABS     omeprazole (PRILOSEC) 20 MG CR capsule     gabapentin (NEURONTIN) 300 MG capsule     QUEtiapine (SEROQUEL) 50 MG tablet        Allergies   Allergen Reactions     Trazodone Other (See Comments)     erection       Review of Systems   Constitutional: Negative for fever.   Respiratory: Negative.    Cardiovascular: Negative.    Gastrointestinal: Positive for abdominal pain and vomiting. Negative for abdominal distention, blood in stool, constipation and diarrhea.        Positive - dark stool   All other systems reviewed and are negative.      Physical Exam   BP: (!)  "152/109  Pulse: 75  Heart Rate: 94  Temp: 98.3  F (36.8  C)  Resp: 16  Height: 190.5 cm (6' 3\")  Weight: 96.7 kg (213 lb 1.6 oz)  SpO2: 98 %      Physical Exam   Constitutional: He is oriented to person, place, and time. He appears well-developed and well-nourished. No distress.   HENT:   Head: Normocephalic and atraumatic.   Eyes: Conjunctivae and EOM are normal. Pupils are equal, round, and reactive to light. No scleral icterus.   Neck: Neck supple.   Cardiovascular: Normal rate, regular rhythm and normal heart sounds.    Pulmonary/Chest: Effort normal and breath sounds normal. No respiratory distress. He has no wheezes.   Abdominal: There is tenderness.   Neurological: He is alert and oriented to person, place, and time. No cranial nerve deficit.   Skin: Skin is warm and dry.   Psychiatric: He has a normal mood and affect. His behavior is normal.   Nursing note and vitals reviewed.      ED Course   11:52 PM  The patient was seen and examined by Kalyan Cifuentes MD in Room 2.     ED Course     Procedures        Medications   0.9% sodium chloride BOLUS (0 mLs Intravenous Stopped 5/30/18 0202)   ketorolac (TORADOL) injection 30 mg (30 mg Intravenous Given 5/29/18 2354)   morphine (PF) injection 4 mg (4 mg Intravenous Given 5/30/18 0108)   HYDROmorphone (PF) (DILAUDID) injection 0.5 mg (0.5 mg Intravenous Given 5/30/18 0135)            Labs Ordered and Resulted from Time of ED Arrival Up to the Time of Departure from the ED   COMPREHENSIVE METABOLIC PANEL - Abnormal; Notable for the following:        Result Value    Glucose 118 (*)     All other components within normal limits   LIPASE - Abnormal; Notable for the following:     Lipase 471 (*)     All other components within normal limits   CBC WITH PLATELETS DIFFERENTIAL            Assessments & Plan (with Medical Decision Making)   Frank Arguello is a 33 year old male presents with acute epigastric pain. He says this feels like his previous pancreatitis. His last " attack was about a year ago. He has a history of alcohol abuse and opiate dependency. He denies opiates currently. He did have a few beers over the weekend. His labs are normal except a lipase of 479. I do not feel that he necessarily needs to be admitted for this. He is receiving IV fluids and pain medication. He says this is clearly like his previous episode. I think that if we can make him comfortable, he can be discharged home. He will be signed out to the overnight doctor.      I have reviewed the nursing notes.    I have reviewed the findings, diagnosis, plan and need for follow up with the patient.    Discharge Medication List as of 5/30/2018  2:02 AM          Final diagnoses:   Abdominal pain, epigastric     I, Danny Hopkins, am serving as a trained medical scribe to document services personally performed by Kalyan Cifuentes MD, based on the provider's statements to me.   I, Kalyan Cifuentes MD, was physically present and have reviewed and verified the accuracy of this note documented by Danny Hopkins.    5/29/2018   [facility], [place], EMERGENCY DEPARTMENT     Justin Cifuentes MD  06/01/18 0717

## 2018-05-30 NOTE — DISCHARGE INSTRUCTIONS
You should have a primary care provider, see below  Please make an appointment to follow up with [facility] (phone: [phone]) as soon as possible.  Please make an appointment to follow up with [facility] (phone: [phone]) as soon as possible.

## 2019-03-14 ENCOUNTER — OFFICE VISIT - HEALTHEAST (OUTPATIENT)
Dept: FAMILY MEDICINE | Facility: CLINIC | Age: 34
End: 2019-03-14

## 2019-03-14 ENCOUNTER — COMMUNICATION - HEALTHEAST (OUTPATIENT)
Dept: FAMILY MEDICINE | Facility: CLINIC | Age: 34
End: 2019-03-14

## 2019-03-14 DIAGNOSIS — R03.0 ELEVATED BLOOD PRESSURE READING WITHOUT DIAGNOSIS OF HYPERTENSION: ICD-10-CM

## 2019-03-14 DIAGNOSIS — Z01.818 PREOP GENERAL PHYSICAL EXAM: ICD-10-CM

## 2019-03-14 DIAGNOSIS — M54.12 CERVICAL RADICULOPATHY: ICD-10-CM

## 2019-03-14 LAB
ALBUMIN SERPL-MCNC: 4.8 G/DL (ref 3.5–5)
ALP SERPL-CCNC: 78 U/L (ref 45–120)
ALT SERPL W P-5'-P-CCNC: 91 U/L (ref 0–45)
ANION GAP SERPL CALCULATED.3IONS-SCNC: 17 MMOL/L (ref 5–18)
AST SERPL W P-5'-P-CCNC: 132 U/L (ref 0–40)
BASOPHILS # BLD AUTO: 0.1 THOU/UL (ref 0–0.2)
BASOPHILS NFR BLD AUTO: 1 % (ref 0–2)
BILIRUB SERPL-MCNC: 1.1 MG/DL (ref 0–1)
BUN SERPL-MCNC: 12 MG/DL (ref 8–22)
CALCIUM SERPL-MCNC: 10.2 MG/DL (ref 8.5–10.5)
CHLORIDE BLD-SCNC: 100 MMOL/L (ref 98–107)
CO2 SERPL-SCNC: 21 MMOL/L (ref 22–31)
CREAT SERPL-MCNC: 0.92 MG/DL (ref 0.7–1.3)
EOSINOPHIL # BLD AUTO: 0.1 THOU/UL (ref 0–0.4)
EOSINOPHIL NFR BLD AUTO: 1 % (ref 0–6)
ERYTHROCYTE [DISTWIDTH] IN BLOOD BY AUTOMATED COUNT: 13.1 % (ref 11–14.5)
GFR SERPL CREATININE-BSD FRML MDRD: >60 ML/MIN/1.73M2
GLUCOSE BLD-MCNC: 109 MG/DL (ref 70–125)
HCT VFR BLD AUTO: 42.4 % (ref 40–54)
HGB BLD-MCNC: 14.5 G/DL (ref 14–18)
INR PPP: 1.01 (ref 0.9–1.1)
LYMPHOCYTES # BLD AUTO: 1.8 THOU/UL (ref 0.8–4.4)
LYMPHOCYTES NFR BLD AUTO: 16 % (ref 20–40)
MCH RBC QN AUTO: 31.3 PG (ref 27–34)
MCHC RBC AUTO-ENTMCNC: 34.2 G/DL (ref 32–36)
MCV RBC AUTO: 92 FL (ref 80–100)
MONOCYTES # BLD AUTO: 0.8 THOU/UL (ref 0–0.9)
MONOCYTES NFR BLD AUTO: 7 % (ref 2–10)
NEUTROPHILS # BLD AUTO: 8.9 THOU/UL (ref 2–7.7)
NEUTROPHILS NFR BLD AUTO: 76 % (ref 50–70)
PLATELET # BLD AUTO: 186 THOU/UL (ref 140–440)
PMV BLD AUTO: 8.1 FL (ref 7–10)
POTASSIUM BLD-SCNC: 3.6 MMOL/L (ref 3.5–5)
PROT SERPL-MCNC: 8.3 G/DL (ref 6–8)
RBC # BLD AUTO: 4.62 MILL/UL (ref 4.4–6.2)
SODIUM SERPL-SCNC: 138 MMOL/L (ref 136–145)
WBC: 11.7 THOU/UL (ref 4–11)

## 2019-03-14 ASSESSMENT — MIFFLIN-ST. JEOR: SCORE: 1986.12

## 2020-03-01 ENCOUNTER — HEALTH MAINTENANCE LETTER (OUTPATIENT)
Age: 35
End: 2020-03-01

## 2020-04-14 ENCOUNTER — COMMUNICATION - HEALTHEAST (OUTPATIENT)
Dept: FAMILY MEDICINE | Facility: CLINIC | Age: 35
End: 2020-04-14

## 2020-04-14 DIAGNOSIS — R03.0 ELEVATED BLOOD PRESSURE READING WITHOUT DIAGNOSIS OF HYPERTENSION: ICD-10-CM

## 2020-12-14 ENCOUNTER — HEALTH MAINTENANCE LETTER (OUTPATIENT)
Age: 35
End: 2020-12-14

## 2021-04-18 ENCOUNTER — HEALTH MAINTENANCE LETTER (OUTPATIENT)
Age: 36
End: 2021-04-18

## 2021-06-01 VITALS — BODY MASS INDEX: 26.62 KG/M2 | WEIGHT: 213 LBS

## 2021-06-02 VITALS — BODY MASS INDEX: 27.85 KG/M2 | HEIGHT: 74 IN | WEIGHT: 217 LBS

## 2021-06-07 NOTE — TELEPHONE ENCOUNTER
RN cannot approve Refill Request    RN can NOT refill this medication Protocol failed and NO refill given.       Treasure Lara, [facility] Triage/Med Refill 4/15/2020    Requested Prescriptions   Pending Prescriptions Disp Refills     metoprolol succinate (TOPROL-XL) 25 MG [Pharmacy Med Name: METOPROLOL ER SUCCINATE 25MG TABS] 180 tablet 3     Sig: TAKE 2 TABLETS(50 MG) BY MOUTH DAILY       Beta-Blockers Refill Protocol Failed - 4/14/2020 11:02 AM        Failed - PCP or prescribing provider visit in past 12 months or next 3 months     Last office visit with prescriber/PCP: 10/27/2016 Dayana Lawson FNP OR same dept: Visit date not found OR same specialty: 2/28/2018 Monica Berger MD  Last physical: 3/14/2019 Last MTM visit: Visit date not found   Next visit within 3 mo: Visit date not found  Next physical within 3 mo: Visit date not found  Prescriber OR PCP: JOCY Grubbs  Last diagnosis associated with med order: 1. Elevated blood pressure reading without diagnosis of hypertension  - metoprolol succinate (TOPROL-XL) 25 MG [Pharmacy Med Name: METOPROLOL ER SUCCINATE 25MG TABS]; TAKE 2 TABLETS(50 MG) BY MOUTH DAILY  Dispense: 90 tablet; Refill: 3    If protocol passes may refill for 12 months if within 3 months of last provider visit (or a total of 15 months).             Failed - Blood pressure filed in past 12 months     BP Readings from Last 1 Encounters:   03/14/19 (!) 152/100

## 2021-06-10 ENCOUNTER — OFFICE VISIT (OUTPATIENT)
Dept: FAMILY MEDICINE | Facility: CLINIC | Age: 36
End: 2021-06-10
Payer: COMMERCIAL

## 2021-06-10 VITALS
BODY MASS INDEX: 28.08 KG/M2 | DIASTOLIC BLOOD PRESSURE: 102 MMHG | TEMPERATURE: 99.2 F | HEIGHT: 73 IN | OXYGEN SATURATION: 99 % | WEIGHT: 211.9 LBS | SYSTOLIC BLOOD PRESSURE: 167 MMHG | HEART RATE: 104 BPM

## 2021-06-10 DIAGNOSIS — R10.9 CHRONIC ABDOMINAL PAIN: ICD-10-CM

## 2021-06-10 DIAGNOSIS — M54.50 ACUTE BILATERAL LOW BACK PAIN WITHOUT SCIATICA: ICD-10-CM

## 2021-06-10 DIAGNOSIS — F19.11 HISTORY OF SUBSTANCE ABUSE (H): ICD-10-CM

## 2021-06-10 DIAGNOSIS — I10 BENIGN ESSENTIAL HYPERTENSION: ICD-10-CM

## 2021-06-10 DIAGNOSIS — F51.01 PRIMARY INSOMNIA: ICD-10-CM

## 2021-06-10 DIAGNOSIS — F10.10 ALCOHOL ABUSE: ICD-10-CM

## 2021-06-10 DIAGNOSIS — K85.20 ALCOHOL-INDUCED ACUTE PANCREATITIS WITHOUT INFECTION OR NECROSIS: ICD-10-CM

## 2021-06-10 DIAGNOSIS — F11.20 UNCOMPLICATED OPIOID DEPENDENCE (H): ICD-10-CM

## 2021-06-10 DIAGNOSIS — Z00.00 ROUTINE HISTORY AND PHYSICAL EXAMINATION OF ADULT: Primary | ICD-10-CM

## 2021-06-10 DIAGNOSIS — G89.29 CHRONIC ABDOMINAL PAIN: ICD-10-CM

## 2021-06-10 DIAGNOSIS — F41.9 ANXIETY: ICD-10-CM

## 2021-06-10 LAB
ALBUMIN SERPL-MCNC: 4.7 G/DL (ref 3.4–5)
ALP SERPL-CCNC: 69 U/L (ref 40–150)
ALT SERPL W P-5'-P-CCNC: 95 U/L (ref 0–70)
AMYLASE SERPL-CCNC: 22 U/L (ref 30–110)
ANION GAP SERPL CALCULATED.3IONS-SCNC: 6 MMOL/L (ref 3–14)
AST SERPL W P-5'-P-CCNC: 102 U/L (ref 0–45)
BASOPHILS # BLD AUTO: 0 10E9/L (ref 0–0.2)
BASOPHILS NFR BLD AUTO: 0.5 %
BILIRUB SERPL-MCNC: 0.9 MG/DL (ref 0.2–1.3)
BILIRUBIN UR: NEGATIVE MG/DL
BLOOD UR: NEGATIVE MG/DL
BUN SERPL-MCNC: 10 MG/DL (ref 7–30)
CALCIUM SERPL-MCNC: 9.4 MG/DL (ref 8.5–10.1)
CHLORIDE SERPL-SCNC: 101 MMOL/L (ref 94–109)
CLARITY, URINE: CLEAR
CO2 SERPL-SCNC: 27 MMOL/L (ref 20–32)
COLOR UR: YELLOW
CREAT SERPL-MCNC: 0.81 MG/DL (ref 0.66–1.25)
DIFFERENTIAL METHOD BLD: ABNORMAL
EOSINOPHIL # BLD AUTO: 0 10E9/L (ref 0–0.7)
EOSINOPHIL NFR BLD AUTO: 0.1 %
ERYTHROCYTE [DISTWIDTH] IN BLOOD BY AUTOMATED COUNT: 13.2 % (ref 10–15)
GFR SERPL CREATININE-BSD FRML MDRD: >90 ML/MIN/{1.73_M2}
GLUCOSE SERPL-MCNC: 108 MG/DL (ref 70–99)
GLUCOSE URINE: NEGATIVE
HCT VFR BLD AUTO: 45.8 % (ref 40–53)
HGB BLD-MCNC: 15.2 G/DL (ref 13.3–17.7)
IMM GRANULOCYTES # BLD: 0.1 10E9/L (ref 0–0.4)
IMM GRANULOCYTES NFR BLD: 0.7 %
KETONES UR QL: NEGATIVE MG/DL
LEUKOCYTE ESTERASE UR: NEGATIVE
LIPASE SERPL-CCNC: 20 U/L (ref 73–393)
LYMPHOCYTES # BLD AUTO: 0.6 10E9/L (ref 0.8–5.3)
LYMPHOCYTES NFR BLD AUTO: 8.6 %
MCH RBC QN AUTO: 31.5 PG (ref 26.5–33)
MCHC RBC AUTO-ENTMCNC: 33.2 G/DL (ref 31.5–36.5)
MCV RBC AUTO: 95 FL (ref 78–100)
MONOCYTES # BLD AUTO: 1.2 10E9/L (ref 0–1.3)
MONOCYTES NFR BLD AUTO: 15.7 %
NEUTROPHILS # BLD AUTO: 5.6 10E9/L (ref 1.6–8.3)
NEUTROPHILS NFR BLD AUTO: 74.4 %
NITRITE UR QL STRIP: NEGATIVE MG/DL
NRBC # BLD AUTO: 0 10*3/UL
NRBC BLD AUTO-RTO: 0 /100
PH UR STRIP: 6 [PH] (ref 4.5–8)
PLATELET # BLD AUTO: 118 10E9/L (ref 150–450)
POTASSIUM SERPL-SCNC: 4.2 MMOL/L (ref 3.4–5.3)
PROT SERPL-MCNC: 8.7 G/DL (ref 6.8–8.8)
PROTEIN UR: NEGATIVE MG/DL
RBC # BLD AUTO: 4.82 10E12/L (ref 4.4–5.9)
SODIUM SERPL-SCNC: 134 MMOL/L (ref 133–144)
SP GR UR STRIP: <=1.005 (ref 1–1.03)
UROBILINOGEN UR STRIP-ACNC: NORMAL E.U./DL
WBC # BLD AUTO: 7.5 10E9/L (ref 4–11)

## 2021-06-10 RX ORDER — METOPROLOL SUCCINATE 50 MG/1
50 TABLET, EXTENDED RELEASE ORAL DAILY
Qty: 90 TABLET | Refills: 3 | Status: ON HOLD | OUTPATIENT
Start: 2021-06-10 | End: 2022-01-01

## 2021-06-10 RX ORDER — QUETIAPINE FUMARATE 25 MG/1
25 TABLET, FILM COATED ORAL
Qty: 60 TABLET | Refills: 0 | Status: SHIPPED | OUTPATIENT
Start: 2021-06-10 | End: 2021-07-20

## 2021-06-10 RX ORDER — VALACYCLOVIR HYDROCHLORIDE 1 G/1
TABLET, FILM COATED ORAL
COMMUNITY
Start: 2020-04-20 | End: 2021-06-14

## 2021-06-10 ASSESSMENT — PATIENT HEALTH QUESTIONNAIRE - PHQ9
SUM OF ALL RESPONSES TO PHQ QUESTIONS 1-9: 8
5. POOR APPETITE OR OVEREATING: SEVERAL DAYS

## 2021-06-10 ASSESSMENT — ANXIETY QUESTIONNAIRES
1. FEELING NERVOUS, ANXIOUS, OR ON EDGE: SEVERAL DAYS
IF YOU CHECKED OFF ANY PROBLEMS ON THIS QUESTIONNAIRE, HOW DIFFICULT HAVE THESE PROBLEMS MADE IT FOR YOU TO DO YOUR WORK, TAKE CARE OF THINGS AT HOME, OR GET ALONG WITH OTHER PEOPLE: NOT DIFFICULT AT ALL
7. FEELING AFRAID AS IF SOMETHING AWFUL MIGHT HAPPEN: NOT AT ALL
3. WORRYING TOO MUCH ABOUT DIFFERENT THINGS: NOT AT ALL
GAD7 TOTAL SCORE: 3
5. BEING SO RESTLESS THAT IT IS HARD TO SIT STILL: SEVERAL DAYS
2. NOT BEING ABLE TO STOP OR CONTROL WORRYING: NOT AT ALL
6. BECOMING EASILY ANNOYED OR IRRITABLE: NOT AT ALL

## 2021-06-10 ASSESSMENT — MIFFLIN-ST. JEOR: SCORE: 1956.63

## 2021-06-10 NOTE — LETTER
Return to  Work Release    Date: 6/10/2021      Name: Frank Arguello                       YOB: 1985    Medical Record Number: [id number]    The patient was seen at: [facility]    Restrictions if any: off work from 6/3/2021 until 6/14/2021    Resume Activity: 6/16/2021        _________________________  Leonora Torres NP

## 2021-06-10 NOTE — LETTER
Return to  Work Release    Date: 6/10/2021      Name: Frank Arguello                       YOB: 1985    Medical Record Number: [id number]    The patient was seen at: [facility]     Restrictions if any: off work Wednesday afternoon, 6/2/2021, through 6/13/2021    Resume Activity: 6/14/2021        _________________________  Leonora Torres NP

## 2021-06-10 NOTE — NURSING NOTE
"ROOM:2    Preferred Name: Frank     36 year old  Chief Complaint   Patient presents with     Physical       Blood pressure (!) 167/102, pulse 104, temperature 99.2  F (37.3  C), temperature source Oral, height 1.857 m (6' 1.1\"), weight 96.1 kg (211 lb 14.4 oz), SpO2 99 %. Body mass index is 27.88 kg/m .      Patient Active Problem List   Diagnosis     Acute pancreatitis     Alcohol dependence (H)     Hepatic steatosis     Chronic abdominal pain     Opioid dependence s/p methadone treatment     Abnormal transaminases     Pancreatitis     Alcohol intoxication in active alcoholic (H)     Opioid use disorder, severe, dependence (H)     Opioid dependence with withdrawal (H)     Severe alcohol use disorder (H)     Drug abuse (H)     Alcohol withdrawal (H)     Other secondary hypertension     Adjustment disorder with anxious mood     Anxiety       Wt Readings from Last 2 Encounters:   06/10/21 96.1 kg (211 lb 14.4 oz)   03/14/19 98.4 kg (217 lb)     BP Readings from Last 3 Encounters:   06/10/21 (!) 167/102   05/30/18 (!) 133/106   02/22/17 (!) 162/106       Allergies   Allergen Reactions     Trazodone Other (See Comments)     erection       Current Outpatient Medications   Medication     multivitamin, therapeutic with minerals (THERA-VIT-M) TABS     QUEtiapine (SEROQUEL) 50 MG tablet     gabapentin (NEURONTIN) 300 MG capsule     metoprolol (TOPROL-XL) 50 MG 24 hr tablet     omeprazole (PRILOSEC) 20 MG CR capsule     valACYclovir (VALTREX) 1000 mg tablet     No current facility-administered medications for this visit.        Social History     Tobacco Use     Smoking status: Current Some Day Smoker     Packs/day: 1.00     Types: Cigarettes     Smokeless tobacco: Former User   Substance Use Topics     Alcohol use: Yes     Alcohol/week: 0.0 standard drinks     Drug use: No     Comment: Heroin - last used about 1 year. Oxycodone       Honoring Choices - Health Care Directive Guide offered to patient at time of " visit.    Health Maintenance Due   Topic Date Due     PREVENTIVE CARE VISIT  Never done     ADVANCE CARE PLANNING  Never done     Pneumococcal Vaccine: Pediatrics (0 to 5 Years) and At-Risk Patients (6 to 64 Years) (1 of 2 - PPSV23) Never done     COVID-19 Vaccine (1) Never done     HEPATITIS B IMMUNIZATION (1 of 3 - Risk 3-dose series) Never done     URINE DRUG SCREEN  02/20/2018       Immunization History   Administered Date(s) Administered     Historic Hib Hib-titer 10/26/1988     Historical DTP/aP 1987, 1987, 1987, 09/28/1988     MMR 06/22/1988     Meningococcal (Menveo ) 09/19/2005     OPV, trivalent, live 1987, 1987, 09/28/1988     TD (ADULT, 7+) 01/01/2014     Td (Adult), Adsorbed 06/07/1999       No results found for: PAP      Recent Labs   Lab Test 05/29/18  2345 02/22/17  0618 02/21/17  0637 08/20/16  1658 08/20/16  1658 08/04/16  0747 05/30/13  0047 05/30/13  0047   LDL  --   --   --   --   --   --   --  63   HDL  --   --   --   --   --   --   --  46   TRIG  --   --   --   --   --   --   --  63   ALT 30 18 19   < > 45 18   < > 79*   CR 0.89 0.71 0.90   < > 0.80 0.86   < > 0.68   GFRESTIMATED >90 >90  Non  GFR Calc   >90  Non  GFR Calc     < > >90  Non  GFR Calc   >90  Non  GFR Calc     < > >90   GFRESTBLACK >90 >90   GFR Calc   >90   GFR Calc     < > >90   GFR Calc   >90   GFR Calc     < > >90   ALBUMIN 4.0 3.1* 3.2*   < > 4.0 3.4   < > 3.6*   POTASSIUM 3.6 3.7 3.6   < > 3.8 4.3   < > 3.6   TSH  --   --   --   --  0.62 0.05*  --   --     < > = values in this interval not displayed.       PHQ-2 ( 1999 Pfizer) 1/4/2017 12/6/2016   Q1: Little interest or pleasure in doing things 1 3   Q2: Feeling down, depressed or hopeless 1 2   PHQ-2 Score 2 5       PHQ-9 SCORE 11/15/2016 11/25/2016 6/10/2021   PHQ-9 Total Score 9 21 8       ROCHELLE-7 SCORE 11/15/2016  6/10/2021   Total Score 14 3       No flowsheet data found.      Naomi Alicea, [facility]  [place] 10, 2021 1:31 PM

## 2021-06-11 LAB — HCV AB SERPL QL IA: NONREACTIVE

## 2021-06-11 ASSESSMENT — ANXIETY QUESTIONNAIRES: GAD7 TOTAL SCORE: 3

## 2021-06-14 DIAGNOSIS — B00.9 HSV (HERPES SIMPLEX VIRUS) INFECTION: Primary | ICD-10-CM

## 2021-06-14 RX ORDER — VALACYCLOVIR HYDROCHLORIDE 1 G/1
1000 TABLET, FILM COATED ORAL 2 TIMES DAILY
Qty: 14 TABLET | Refills: 4 | Status: SHIPPED | OUTPATIENT
Start: 2021-06-14 | End: 2022-01-01

## 2021-06-16 NOTE — PROGRESS NOTES
Assessment/Plan:        Diagnoses and all orders for this visit:    Fall (on) (from) other stairs and steps, initial encounter  -     ketorolac injection 60 mg (TORADOL); Inject 2 mL (60 mg total) into the shoulder, thigh, or buttocks once.  -     naproxen (NAPROSYN) 500 MG tablet; Take 1 tablet (500 mg total) by mouth 2 (two) times a day with meals.  Dispense: 60 tablet; Refill: 2  -     oxyCODONE (ROXICODONE) 5 MG immediate release tablet; Take 1 tablet (5 mg total) by mouth every 6 (six) hours as needed for pain.  Dispense: 40 tablet; Refill: 0    Rib pain on left side  -     XR Ribs Left W PA Chest; Future; Expected date: 2/28/18    Hip pain, acute, left  -     XR Pelvis W 2 Vw Hip Left; Future; Expected date: 2/28/18  X-ray of the ribs as well as the pelvic region was done and to my read did not show any fractures.  He was given a shot of 60 mg of Toradol at the clinic which at this point was not really very helpful for him.  We discussed the treatment and he was given medications as above, and will call him with the radiologist read of the x-rays.  If there is any need to  will let him know.  Otherwise he will follow-up with his primary provider.        Subjective:    Patient ID: rFank Arguello is a 32 y.o. male.    Fall   The accident occurred 12 to 24 hours ago (Stepping out of a hot tube fell onto some step which was coated with ice.Feel on the edge and sustained injury to the left rib area and hip.). The fall occurred while standing. He fell from a height of 3 to 5 ft. There was no blood loss. The point of impact was the left hip (also hit the left lower rib.). The pain is present in the left hip (Also left lower rib area.). The pain is severe. The symptoms are aggravated by movement and ambulation (deep breaths and coughing makes the rib pain worse.). Pertinent negatives include no abdominal pain, fever, headaches, hearing loss, hematuria, loss of consciousness, nausea, numbness, tingling  or vomiting. He has tried rest and NSAID for the symptoms. The treatment provided mild relief.       The following portions of the patient's history were reviewed and updated as appropriate: allergies, current medications, past family history, past medical history, past social history, past surgical history and problem list.    Review of Systems   Constitutional: Negative for fatigue and fever.   HENT: Negative.    Respiratory: Positive for shortness of breath. Negative for cough, chest tightness, wheezing and stridor.    Cardiovascular: Positive for chest pain. Negative for leg swelling.   Gastrointestinal: Negative for abdominal pain, nausea and vomiting.   Genitourinary: Negative for difficulty urinating and hematuria.   Neurological: Negative for dizziness, tingling, loss of consciousness, numbness and headaches.     Vitals:    02/28/18 0937 02/28/18 0955   BP: 134/82    Patient Site: Right Arm    Patient Position: Sitting    Cuff Size: Adult Large    Pulse: (!) 188 88   SpO2: 98%    Weight: 213 lb (96.6 kg)              Objective:    Physical Exam   Constitutional: He is oriented to person, place, and time. He appears well-developed and well-nourished. He appears distressed.   He is in distress due to pain from the chest.   HENT:   Mouth/Throat: Oropharynx is clear and moist.   Neck: Normal range of motion. Neck supple.   Cardiovascular: Normal rate and regular rhythm.    Pulmonary/Chest: Effort normal and breath sounds normal. He exhibits tenderness and bony tenderness.   There is tenderness to the left lateral lower ribs with mild tenderness around the upper abdomen on the left flank.  He does have some bruises noted in the area of tenderness.   Abdominal: Soft. There is no tenderness. There is no guarding.   Musculoskeletal:        Left hip: He exhibits tenderness.   On the left hip around the superior iliac crest, he does have some bruises and tenderness, also around the trochanteric area of the hip left  side there is also some bruises and tenderness.  But range of motion of the hip is normal.   Neurological: He is alert and oriented to person, place, and time.

## 2021-06-25 NOTE — PROGRESS NOTES
Scheduled Procedure: C5/C6 DIsc TReplacement  Surgery Date:  3.18.19  Surgery Location: [place] [phone]    Surgeon:  Dr. Berumen    Assessment/Plan:     1. Preop general physical exam  2. Cervical radiculopathy  - HM1(CBC and Differential)  - Comprehensive Metabolic Panel  - INR  - HM1 (CBC with Diff)    3. Elevated blood pressure reading without diagnosis of hypertension  Medication: begin Metoprolol.  Screening labs for initial evaluation: basic metabolic panel, blood sugar, creatinine and potassium.  Dietary sodium restriction.  Regular aerobic exercise.  Check blood pressures 2 times daily and record.  Follow up: 1 week and as needed.   - metoprolol succinate (TOPROL-XL) 25 MG; Take 2 tablets (50 mg total) by mouth daily.  Dispense: 90 tablet; Refill: 3    Surgical Procedure Risk: Low (reported cardiac risk generally < 1%)  Have you had prior anesthesia?: Yes  Have you or any family members had a previous anesthesia reaction:  No  Do you or any family members have a history of a clotting or bleeding disorder?: No  Cardiac Risk Assessment: no increased risk for major cardiac complications    Patient is not approved for surgery At this time until blood pressure is well managed.    During this visit patient was diaphoretic with increased tremors.  Heart rate was elevated at 111 and blood pressure was also elevated.  I suspect the patient is going through withdrawal as he stated that he has not had a drink in 2 days.  Started treatment with metoprolol and recommend the patient return in 1 week for blood pressure recheck and reevaluation.    Functional Status: Independent  Patient plans to recover at home with family.     Subjective:      Frank Arguello is a 33 y.o. male who presents for a preoperative consultation.  Patient with history of alcoholic pancreatitis, alcohol abuse, hepatic stenosis, anxiety disorder, alcohol withdrawal.  Patient presents for preop examination.  He reported that few  months ago while he was at work that he had a pop sound when he moved his neck and since then he has had back issues and recent MRI revealed broad bar disc osteophyte complex at the C5/C6 posterior annular fissures.  There is also impingement of the existing right C6 nerve root which was correlated with the right C6 radiculopathy.  C5-C6 disc replacement was recommended, risk and benefits was discussed with the patient, including alternative to surgery.    All other systems reviewed and are negative, other than those listed in the HPI.    Pertinent History  Do you have difficulty breathing or chest pain after walking up a flight of stairs: No  History of obstructive sleep apnea: No  Steroid use in the last 6 months: Yes: Steroid injection 12.26.18  Frequent Aspirin/NSAID use: No  Prior Blood Transfusion: No  Prior Blood Transfusion Reaction: No  If for some reason prior to, during or after the procedure, if it is medically indicated, would you be willing to have a blood transfusion?:  There is no transfusion refusal.    Current Outpatient Medications   Medication Sig Dispense Refill     busPIRone (BUSPAR) 15 MG tablet Take 1 tablet (15 mg total) by mouth 3 (three) times a day. 90 tablet 0     cloNIDine HCl (CATAPRES) 0.2 MG tablet Take 0.2 mg by mouth daily as needed (takes when diastolic BP >100).       folic acid (FOLVITE) 400 MCG tablet Take 400 mcg by mouth daily.       gabapentin (NEURONTIN) 300 MG capsule Take 600 mg by mouth 3 (three) times a day.       hydrOXYzine (VISTARIL) 50 MG capsule Take 50 mg by mouth daily as needed for anxiety. Takes one 50 mg tablet daily plus one 50 mg tablet as needed for anxiety attacks.       hydrOXYzine (VISTARIL) 50 MG capsule Take 1 capsule (50 mg total) by mouth daily. Takes one 50 mg tablet daily plus one 50 mg tablet as needed for anxiety attacks. 20 capsule 0     multivitamin with minerals (THERA M PLUS, FERROUS FUMARAT,) 9 mg iron-400 mcg Tab tablet Take 1 tablet by  mouth daily.        naproxen (NAPROSYN) 500 MG tablet Take 1 tablet (500 mg total) by mouth 2 (two) times a day with meals. 60 tablet 2     omeprazole (PRILOSEC) 20 MG capsule Take 2 capsules (40 mg total) by mouth daily. 60 capsule 2     QUEtiapine (SEROQUEL) 50 MG tablet Take  mg by mouth bedtime.        thiamine 100 MG tablet Take 1 tablet (100 mg total) by mouth daily.  0     No current facility-administered medications for this visit.         Allergies   Allergen Reactions     Trazodone Other (See Comments)     erection       Patient Active Problem List   Diagnosis     Alcoholic pancreatitis     Alcohol abuse     LOC (loss of consciousness)- etiology?     Hepatic steatosis     Anxiety     Herpes simplex infection of penis     Pancreatitis     Alcohol withdrawal, uncomplicated (H)     Abnormal abdominal ultrasound       Past Medical History:   Diagnosis Date     Alcohol withdrawal (H)      Pancreatitis        Past Surgical History:   Procedure Laterality Date     ABDOMINAL SURGERY         Social History     Socioeconomic History     Marital status: Single     Spouse name: Not on file     Number of children: Not on file     Years of education: Not on file     Highest education level: Not on file   Occupational History     Not on file   Social Needs     Financial resource strain: Not on file     Food insecurity:     Worry: Not on file     Inability: Not on file     Transportation needs:     Medical: Not on file     Non-medical: Not on file   Tobacco Use     Smoking status: Current Every Day Smoker     Packs/day: 2.00     Years: 15.00     Pack years: 30.00     Smokeless tobacco: Never Used   Substance and Sexual Activity     Alcohol use: Yes     Comment: drinks 12 pack of beer per week and 1 Liter of vodka a day average     Drug use: Yes     Frequency: 2.0 times per week     Types: Marijuana     Sexual activity: Yes     Partners: Female     Birth control/protection: Condom   Lifestyle     Physical activity:  "    Days per week: Not on file     Minutes per session: Not on file     Stress: Not on file   Relationships     Social connections:     Talks on phone: Not on file     Gets together: Not on file     Attends Restoration service: Not on file     Active member of club or organization: Not on file     Attends meetings of clubs or organizations: Not on file     Relationship status: Not on file     Intimate partner violence:     Fear of current or ex partner: Not on file     Emotionally abused: Not on file     Physically abused: Not on file     Forced sexual activity: Not on file   Other Topics Concern     Not on file   Social History Narrative     Not on file       Patient Care Team:  Dayana Lawson FNP as PCP - General (Nurse Practitioner)          Objective:     There were no vitals filed for this visit.      Physical Exam:  BP (!) 152/100   Pulse (!) 111   Temp 98.8  F (37.1  C)   Ht 6' 1.5\" (1.867 m)   Wt 217 lb (98.4 kg)   SpO2 99%   BMI 28.24 kg/m    General appearance: alert, appears older than stated age, cooperative, flushed, moderate distress and Diaphoretic with tremors  Head: Normocephalic, without obvious abnormality, atraumatic  Eyes: conjunctivae/corneas clear. PERRL, EOM's intact. Fundi benign.  Ears: normal TM's and external ear canals both ears  Throat: lips, mucosa, and tongue normal; teeth and gums normal  Neck: no adenopathy, no carotid bruit, no JVD, supple, symmetrical, trachea midline and thyroid not enlarged, symmetric, no tenderness/mass/nodules  Lungs: clear to auscultation bilaterally  Chest wall: no tenderness  Heart: S1, S2 normal, no murmur, with tachycardia  Abdomen: soft, non-tender; bowel sounds normal; no masses,  no organomegaly  Extremities: extremities normal, atraumatic, no cyanosis or edema  Pulses: 2+ and symmetric  Skin: Diaphoretic  Lymph nodes: Cervical, supraclavicular, and axillary nodes normal.  Neurologic: Grossly normal      There are no Patient Instructions on " file for this visit.    EKG:  Not indicated    Labs:  Recent Results (from the past 240 hour(s))   HM1 (CBC with Diff)    Collection Time: 03/14/19  1:29 PM   Result Value Ref Range    WBC 11.7 (H) 4.0 - 11.0 thou/uL    RBC 4.62 4.40 - 6.20 mill/uL    Hemoglobin 14.5 14.0 - 18.0 g/dL    Hematocrit 42.4 40.0 - 54.0 %    MCV 92 80 - 100 fL    MCH 31.3 27.0 - 34.0 pg    MCHC 34.2 32.0 - 36.0 g/dL    RDW 13.1 11.0 - 14.5 %    Platelets 186 140 - 440 thou/uL    MPV 8.1 7.0 - 10.0 fL    Neutrophils % 76 (H) 50 - 70 %    Lymphocytes % 16 (L) 20 - 40 %    Monocytes % 7 2 - 10 %    Eosinophils % 1 0 - 6 %    Basophils % 1 0 - 2 %    Neutrophils Absolute 8.9 (H) 2.0 - 7.7 thou/uL    Lymphocytes Absolute 1.8 0.8 - 4.4 thou/uL    Monocytes Absolute 0.8 0.0 - 0.9 thou/uL    Eosinophils Absolute 0.1 0.0 - 0.4 thou/uL    Basophils Absolute 0.1 0.0 - 0.2 thou/uL    and Labs pending at this time.  Results will be reviewed when available.    Immunization History   Administered Date(s) Administered     Td, Adult, Absorbed 06/07/1999, 01/01/2014           Electronically signed by JOCY Grubbs 03/14/19 12:45 PM

## 2021-06-25 NOTE — TELEPHONE ENCOUNTER
TARA for [facility].  Provider approved tablets due to the shortage. Felice Godoy, [facility]

## 2021-06-25 NOTE — TELEPHONE ENCOUNTER
Medication Question or Clarification  Who is calling: Pharmacy: [facility]  What medication are you calling about? (include dose and sig)    Disp Refills Start End    hydrOXYzine pamoate (VISTARIL) 50 MG capsule 60 capsule 0 3/14/2019     Sig - Route: Take 1 capsule (50 mg total) by mouth 3 (three) times a day as needed for itching. - Oral    Sent to pharmacy as: hydrOXYzine pamoate (VISTARIL) 50 MG capsule    E-Prescribing Status: Receipt confirmed by pharmacy (3/14/2019  1:23 PM CDT)      Who prescribed the medication?: Dayana Lawson, JOCY   What is your question/concern?: Caller stated there is a national shortage and they would like to switch to the tablets instead. Please send over a new Rx.  Pharmacy: [facility]  Okay to leave a detailed message?: No  Site CMT - Please call the pharmacy to obtain any additional needed information.

## 2021-07-16 DIAGNOSIS — K85.20 ALCOHOL-INDUCED ACUTE PANCREATITIS WITHOUT INFECTION OR NECROSIS: ICD-10-CM

## 2021-07-16 DIAGNOSIS — R10.9 CHRONIC ABDOMINAL PAIN: ICD-10-CM

## 2021-07-16 DIAGNOSIS — F51.01 PRIMARY INSOMNIA: ICD-10-CM

## 2021-07-16 DIAGNOSIS — F41.9 ANXIETY: ICD-10-CM

## 2021-07-16 DIAGNOSIS — F11.20 UNCOMPLICATED OPIOID DEPENDENCE (H): ICD-10-CM

## 2021-07-16 DIAGNOSIS — G89.29 CHRONIC ABDOMINAL PAIN: ICD-10-CM

## 2021-07-20 NOTE — TELEPHONE ENCOUNTER
QUEtiapine (SEROQUEL) 25 MG tablet   Take 1 tablet (25 mg) by mouth at bedtime as needed, may repeat once (for sleep as needed)      Last Written Prescription Date:  6/10/21  Last Fill Quantity: 60,   # refills: 0  Last Office Visit : 6/10/21  Future Office visit: none    Routing refill request to provider for review/approval because:  Blood pressure out of range   06/10/21 (!) 167/102     Overdue lipid panel

## 2021-07-21 RX ORDER — QUETIAPINE FUMARATE 25 MG/1
25 TABLET, FILM COATED ORAL
Qty: 60 TABLET | Refills: 1 | Status: SHIPPED | OUTPATIENT
Start: 2021-07-21 | End: 2021-12-06

## 2021-08-16 NOTE — PROGRESS NOTES
Frank Arguello is a 36 year old male who presents today to establish care and for a physical exam.  He has several issues that we will begin addressing today.      Establish care.  Physical exam  History of opioid dependence and heavy alcohol use  History of pancreatitis associated with heavy alcohol use (hospitalized March 2020)  History of hypertension on metoprolol      Review Of Systems  Skin: negative  Eyes: negative  Ears/Nose/Throat: negative  Respiratory: No shortness of breath, dyspnea on exertion, cough, or hemoptysis  Cardiovascular: negative  Gastrointestinal: as above, pancreatitis, fatty liver  Genitourinary: negative  Musculoskeletal: as above, low back pain, non-radiating  Neurologic: negative  Psychiatric: sleep disturbance, alcohol and opioid abuse  Hematologic/Lymphatic/Immunologic: negative  Endocrine: negative    Past Medical History:   Diagnosis Date     Anxiety      Depressive disorder      Hypertension      Pancreatitis      Pseudocyst of pancreas      Substance abuse (H)      Past Surgical History:   Procedure Laterality Date     ABDOMEN SURGERY       ENDOSCOPIC RETROGRADE CHOLANGIOPANCREATOGRAM  4/9/2014    Procedure: ENDOSCOPIC RETROGRADE CHOLANGIOPANCREATOGRAM;   Endoscopic Ultrasound with Fine needle aspiration, EsophagoGastroDuodenoscopy with stent placement into pseudeocyst, balloon dilation.  ;  Surgeon: Jagjit Dow MD;  Location:  OR     ESOPHAGOSCOPY, GASTROSCOPY, DUODENOSCOPY (EGD), COMBINED  4/9/2014    Procedure: COMBINED ENDOSCOPIC ULTRASOUND, ESOPHAGOSCOPY, GASTROSCOPY, DUODENOSCOPY (EGD), FINE NEEDLE ASPIRATE/BIOPSY;;  Surgeon: Jagjit Dow MD;  Location:  OR     ESOPHAGOSCOPY, GASTROSCOPY, DUODENOSCOPY (EGD), COMBINED  6/24/2014    Procedure: COMBINED ESOPHAGOSCOPY, GASTROSCOPY, DUODENOSCOPY (EGD);  Surgeon: Jagjit Dow MD;  Location:  OR     Social History     Socioeconomic History     Marital status: Single     Spouse name: Not on file      Number of children: Not on file     Years of education: Not on file     Highest education level: Not on file   Occupational History     Not on file   Tobacco Use     Smoking status: Current Some Day Smoker     Packs/day: 1.00     Types: Cigarettes     Smokeless tobacco: Former User   Substance and Sexual Activity     Alcohol use: Yes     Alcohol/week: 0.0 standard drinks     Comment: 30/week     Drug use: Yes     Types: IV, Opiates     Comment: Heroin - last used about 1 year. Oxycodone     Sexual activity: Not Currently     Partners: Female     Birth control/protection: Condom     Comment: with girlfriend   Other Topics Concern     Parent/sibling w/ CABG, MI or angioplasty before 65F 55M? No   Social History Narrative     Not on file     Social Determinants of Health     Financial Resource Strain:      Difficulty of Paying Living Expenses:    Food Insecurity:      Worried About Running Out of Food in the Last Year:      Ran Out of Food in the Last Year:    Transportation Needs:      Lack of Transportation (Medical):      Lack of Transportation (Non-Medical):    Physical Activity:      Days of Exercise per Week:      Minutes of Exercise per Session:    Stress:      Feeling of Stress :    Social Connections:      Frequency of Communication with Friends and Family:      Frequency of Social Gatherings with Friends and Family:      Attends [employer] Services:      Active Member of Clubs or Organizations:      Attends Club or Organization Meetings:      Marital Status:    Intimate Partner Violence:      Fear of Current or Ex-Partner:      Emotionally Abused:      Physically Abused:      Sexually Abused:      Family History   Problem Relation Age of Onset     Alcohol/Drug Paternal Grandfather      Alcohol/Drug Father      Substance Abuse Father      Hypertension Mother      Other - See Comments Brother          of heroin overdose     Substance Abuse Brother      Hyperlipidemia Maternal Grandmother      Diabetes No  "family hx of      Cancer No family hx of      Unknown/Adopted No family hx of      Depression No family hx of      Anxiety Disorder No family hx of      Schizophrenia No family hx of      Bipolar Disorder No family hx of      Suicide No family hx of      Dementia No family hx of      [place] Disease No family hx of      Parkinsonism No family hx of      Autism Spectrum Disorder No family hx of      Intellectual Disability (Mental Retardation) No family hx of      Mental Illness No family hx of      Suicidality Brother        BP (!) 167/102   Pulse 104   Temp 99.2  F (37.3  C) (Oral)   Ht 1.857 m (6' 1.1\")   Wt 96.1 kg (211 lb 14.4 oz)   SpO2 99%   BMI 27.88 kg/m      Exam:  Constitutional: healthy, alert and no distress  Head: Normocephalic. No masses, lesions, tenderness or abnormalities  Neck: Neck supple. No adenopathy. Thyroid symmetric, normal size,, Carotids without bruits.  ENT: ENT exam normal, no neck nodes or sinus tenderness  Cardiovascular: negative, PMI normal. No lifts, heaves, or thrills. RRR. No murmurs, clicks gallops or rub  Respiratory: negative, Percussion normal. Good diaphragmatic excursion. Lungs clear  Gastrointestinal: Abdomen soft, non-tender. BS normal. No masses, organomegaly  : :no inguinal hernias, normal scrotum, penis,  testes normal  Musculoskeletal: extremities normal- no gross deformities noted, gait normal and normal muscle tone, flexion, extension and ROM of back normal, some pain with deep palpation bilateral lower back, no flank tenderness  Skin: no suspicious lesions or rashes  Neurologic: Gait normal. Reflexes normal and symmetric. Sensation grossly WNL.  Psychiatric: mentation appears normal and affect normal/bright  Hematologic/Lymphatic/Immunologic: Normal cervical lymph nodes    Assessment/Plan:  1. Routine history and physical examination of adult    - CBC with platelets differential  - Comprehensive metabolic panel  - Hepatitis C Screen Reflex to HCV RNA " Quant and Genotype    2. History of substance abuse (H)      3. Alcohol abuse    - Comprehensive metabolic panel  - Lipase  - Amylase    4. Acute bilateral low back pain without sciatica    - Urinalysis, Micro If (UA) (AP [facility] NP CLINIC)    5. Benign essential hypertension    - metoprolol succinate ER (TOPROL-XL) 50 MG 24 hr tablet; Take 1 tablet (50 mg) by mouth daily  Dispense: 90 tablet; Refill: 3    6. Primary insomnia      7. Uncomplicated opioid dependence (H)    8. Alcohol-induced acute pancreatitis without infection or necrosis      9. Chronic abdominal pain      10. Anxiety    Return to clinic in 1-2 week for follow up of concerns.        Options for treatment and follow-up care were reviewed with the patient. Patient engaged in the decision making process and verbalized understanding of the options discussed and agreed with the final plan.

## 2021-10-02 ENCOUNTER — HEALTH MAINTENANCE LETTER (OUTPATIENT)
Age: 36
End: 2021-10-02

## 2021-12-01 DIAGNOSIS — K85.20 ALCOHOL-INDUCED ACUTE PANCREATITIS WITHOUT INFECTION OR NECROSIS: ICD-10-CM

## 2021-12-01 DIAGNOSIS — G89.29 CHRONIC ABDOMINAL PAIN: ICD-10-CM

## 2021-12-01 DIAGNOSIS — F41.9 ANXIETY: ICD-10-CM

## 2021-12-01 DIAGNOSIS — F11.20 UNCOMPLICATED OPIOID DEPENDENCE (H): ICD-10-CM

## 2021-12-01 DIAGNOSIS — F51.01 PRIMARY INSOMNIA: ICD-10-CM

## 2021-12-01 DIAGNOSIS — R10.9 CHRONIC ABDOMINAL PAIN: ICD-10-CM

## 2021-12-06 RX ORDER — QUETIAPINE FUMARATE 25 MG/1
25 TABLET, FILM COATED ORAL
Qty: 30 TABLET | Refills: 3 | Status: ON HOLD | OUTPATIENT
Start: 2021-12-06 | End: 2022-01-01

## 2021-12-06 NOTE — TELEPHONE ENCOUNTER
QUETIAPINE FUMARATE 25 MG TAB  Last Written Prescription Date:  7/21/2021  Last Fill Quantity: 60,   # refills: 1  Last Office Visit : 6/10/2021  Future Office visit:  None    Routing refill request to provider for review/approval because:  Abnormal B/P   Change med?  Change dose?  Add med?  Follow up B/P check?      Also needing updated LIPIDS on file.      Refer to clinic for review     BP Readings from Last 3 Encounters:   06/10/21 (!) 167/102   05/30/18 (!) 133/106   02/22/17 (!) 162/106      Lipid panel on file within the past 12 months      Martina Poe RN  Central Triage Red Flags/Med Refills

## 2022-01-01 ENCOUNTER — HEALTH MAINTENANCE LETTER (OUTPATIENT)
Age: 37
End: 2022-01-01

## 2022-01-01 ENCOUNTER — TELEPHONE (OUTPATIENT)
Dept: BEHAVIORAL HEALTH | Facility: CLINIC | Age: 37
End: 2022-01-01

## 2022-01-01 ENCOUNTER — HOSPITAL ENCOUNTER (EMERGENCY)
Facility: CLINIC | Age: 37
Discharge: HOME | End: 2022-12-02
Attending: EMERGENCY MEDICINE | Admitting: EMERGENCY MEDICINE
Payer: COMMERCIAL

## 2022-01-01 ENCOUNTER — HOSPITAL ENCOUNTER (INPATIENT)
Facility: CLINIC | Age: 37
LOS: 1 days | Discharge: HOME | End: 2022-10-13
Attending: EMERGENCY MEDICINE | Admitting: PSYCHIATRY & NEUROLOGY
Payer: COMMERCIAL

## 2022-01-01 VITALS
WEIGHT: 196.5 LBS | RESPIRATION RATE: 18 BRPM | BODY MASS INDEX: 24.43 KG/M2 | TEMPERATURE: 98.7 F | HEART RATE: 115 BPM | OXYGEN SATURATION: 95 % | SYSTOLIC BLOOD PRESSURE: 134 MMHG | HEIGHT: 75 IN | DIASTOLIC BLOOD PRESSURE: 86 MMHG

## 2022-01-01 VITALS
BODY MASS INDEX: 27.35 KG/M2 | HEIGHT: 75 IN | OXYGEN SATURATION: 97 % | TEMPERATURE: 98.2 F | WEIGHT: 220 LBS | RESPIRATION RATE: 16 BRPM | SYSTOLIC BLOOD PRESSURE: 129 MMHG | HEART RATE: 71 BPM | DIASTOLIC BLOOD PRESSURE: 89 MMHG

## 2022-01-01 DIAGNOSIS — K85.20 ALCOHOL-INDUCED ACUTE PANCREATITIS WITHOUT INFECTION OR NECROSIS: ICD-10-CM

## 2022-01-01 DIAGNOSIS — B00.9 HSV (HERPES SIMPLEX VIRUS) INFECTION: ICD-10-CM

## 2022-01-01 DIAGNOSIS — F10.220 ALCOHOL DEPENDENCE WITH UNCOMPLICATED INTOXICATION (H): ICD-10-CM

## 2022-01-01 DIAGNOSIS — F11.20 UNCOMPLICATED OPIOID DEPENDENCE (H): ICD-10-CM

## 2022-01-01 DIAGNOSIS — R10.9 CHRONIC ABDOMINAL PAIN: ICD-10-CM

## 2022-01-01 DIAGNOSIS — F41.9 ANXIETY: ICD-10-CM

## 2022-01-01 DIAGNOSIS — F51.01 PRIMARY INSOMNIA: ICD-10-CM

## 2022-01-01 DIAGNOSIS — F10.930 ALCOHOL WITHDRAWAL SYNDROME WITHOUT COMPLICATION (H): ICD-10-CM

## 2022-01-01 DIAGNOSIS — Z76.89 ENCOUNTER TO ESTABLISH CARE: Primary | ICD-10-CM

## 2022-01-01 DIAGNOSIS — F10.229 ALCOHOL DEPENDENCE WITH INTOXICATION WITH COMPLICATION (H): ICD-10-CM

## 2022-01-01 DIAGNOSIS — Z20.822 LAB TEST NEGATIVE FOR COVID-19 VIRUS: ICD-10-CM

## 2022-01-01 DIAGNOSIS — G89.29 CHRONIC ABDOMINAL PAIN: ICD-10-CM

## 2022-01-01 LAB
ALBUMIN SERPL-MCNC: 4.5 G/DL (ref 3.4–5)
ALBUMIN SERPL-MCNC: 4.5 G/DL (ref 3.4–5)
ALCOHOL BREATH TEST: 0.2 (ref 0–0.01)
ALCOHOL BREATH TEST: 0.33 (ref 0–0.01)
ALCOHOL BREATH TEST: 0.4 (ref 0–0.01)
ALP SERPL-CCNC: 61 U/L (ref 40–150)
ALP SERPL-CCNC: 79 U/L (ref 40–150)
ALT SERPL W P-5'-P-CCNC: 32 U/L (ref 0–70)
ALT SERPL W P-5'-P-CCNC: 52 U/L (ref 0–70)
AMPHETAMINES UR QL SCN: ABNORMAL
ANION GAP SERPL CALCULATED.3IONS-SCNC: 14 MMOL/L (ref 3–14)
ANION GAP SERPL CALCULATED.3IONS-SCNC: 8 MMOL/L (ref 3–14)
AST SERPL W P-5'-P-CCNC: 58 U/L (ref 0–45)
AST SERPL W P-5'-P-CCNC: 68 U/L (ref 0–45)
AST SERPL W P-5'-P-CCNC: NORMAL U/L
BARBITURATES UR QL: ABNORMAL
BASOPHILS # BLD AUTO: 0.1 10E3/UL (ref 0–0.2)
BASOPHILS # BLD AUTO: 0.1 10E3/UL (ref 0–0.2)
BASOPHILS NFR BLD AUTO: 1 %
BASOPHILS NFR BLD AUTO: 1 %
BENZODIAZ UR QL: ABNORMAL
BILIRUB SERPL-MCNC: 0.4 MG/DL (ref 0.2–1.3)
BILIRUB SERPL-MCNC: 0.4 MG/DL (ref 0.2–1.3)
BUN SERPL-MCNC: 7 MG/DL (ref 7–30)
BUN SERPL-MCNC: 8 MG/DL (ref 7–30)
CALCIUM SERPL-MCNC: 8.9 MG/DL (ref 8.5–10.1)
CALCIUM SERPL-MCNC: 9.2 MG/DL (ref 8.5–10.1)
CANNABINOIDS UR QL SCN: ABNORMAL
CHLORIDE BLD-SCNC: 104 MMOL/L (ref 94–109)
CHLORIDE BLD-SCNC: 105 MMOL/L (ref 94–109)
CHOLEST SERPL-MCNC: 144 MG/DL
CO2 SERPL-SCNC: 23 MMOL/L (ref 20–32)
CO2 SERPL-SCNC: 29 MMOL/L (ref 20–32)
COCAINE UR QL: ABNORMAL
CREAT SERPL-MCNC: 0.64 MG/DL (ref 0.66–1.25)
CREAT SERPL-MCNC: 0.69 MG/DL (ref 0.66–1.25)
EOSINOPHIL # BLD AUTO: 0 10E3/UL (ref 0–0.7)
EOSINOPHIL # BLD AUTO: 0 10E3/UL (ref 0–0.7)
EOSINOPHIL NFR BLD AUTO: 0 %
EOSINOPHIL NFR BLD AUTO: 0 %
ERYTHROCYTE [DISTWIDTH] IN BLOOD BY AUTOMATED COUNT: 12.5 % (ref 10–15)
ERYTHROCYTE [DISTWIDTH] IN BLOOD BY AUTOMATED COUNT: 12.7 % (ref 10–15)
GFR SERPL CREATININE-BSD FRML MDRD: >90 ML/MIN/1.73M2
GFR SERPL CREATININE-BSD FRML MDRD: >90 ML/MIN/1.73M2
GGT SERPL-CCNC: 89 U/L (ref 0–75)
GLUCOSE BLD-MCNC: 134 MG/DL (ref 70–99)
GLUCOSE BLD-MCNC: 147 MG/DL (ref 70–99)
HCT VFR BLD AUTO: 41.5 % (ref 40–53)
HCT VFR BLD AUTO: 42.2 % (ref 40–53)
HDLC SERPL-MCNC: 57 MG/DL
HGB BLD-MCNC: 14.6 G/DL (ref 13.3–17.7)
HGB BLD-MCNC: 14.7 G/DL (ref 13.3–17.7)
HOLD SPECIMEN: NORMAL
HOLD SPECIMEN: NORMAL
IMM GRANULOCYTES # BLD: 0 10E3/UL
IMM GRANULOCYTES # BLD: 0 10E3/UL
IMM GRANULOCYTES NFR BLD: 0 %
IMM GRANULOCYTES NFR BLD: 0 %
LDLC SERPL CALC-MCNC: 53 MG/DL
LYMPHOCYTES # BLD AUTO: 1.3 10E3/UL (ref 0.8–5.3)
LYMPHOCYTES # BLD AUTO: 1.8 10E3/UL (ref 0.8–5.3)
LYMPHOCYTES NFR BLD AUTO: 20 %
LYMPHOCYTES NFR BLD AUTO: 25 %
MAGNESIUM SERPL-MCNC: 2.4 MG/DL (ref 1.6–2.3)
MCH RBC QN AUTO: 32.2 PG (ref 26.5–33)
MCH RBC QN AUTO: 33 PG (ref 26.5–33)
MCHC RBC AUTO-ENTMCNC: 34.8 G/DL (ref 31.5–36.5)
MCHC RBC AUTO-ENTMCNC: 35.2 G/DL (ref 31.5–36.5)
MCV RBC AUTO: 93 FL (ref 78–100)
MCV RBC AUTO: 94 FL (ref 78–100)
MONOCYTES # BLD AUTO: 0.3 10E3/UL (ref 0–1.3)
MONOCYTES # BLD AUTO: 0.7 10E3/UL (ref 0–1.3)
MONOCYTES NFR BLD AUTO: 5 %
MONOCYTES NFR BLD AUTO: 9 %
NEUTROPHILS # BLD AUTO: 4.6 10E3/UL (ref 1.6–8.3)
NEUTROPHILS # BLD AUTO: 4.6 10E3/UL (ref 1.6–8.3)
NEUTROPHILS NFR BLD AUTO: 65 %
NEUTROPHILS NFR BLD AUTO: 74 %
NONHDLC SERPL-MCNC: 87 MG/DL
NRBC # BLD AUTO: 0 10E3/UL
NRBC # BLD AUTO: 0 10E3/UL
NRBC BLD AUTO-RTO: 0 /100
NRBC BLD AUTO-RTO: 0 /100
OPIATES UR QL SCN: ABNORMAL
PLATELET # BLD AUTO: 123 10E3/UL (ref 150–450)
PLATELET # BLD AUTO: 278 10E3/UL (ref 150–450)
POTASSIUM BLD-SCNC: 3.4 MMOL/L (ref 3.4–5.3)
POTASSIUM BLD-SCNC: 4 MMOL/L (ref 3.4–5.3)
POTASSIUM BLD-SCNC: 4.6 MMOL/L (ref 3.4–5.3)
PROT SERPL-MCNC: 8.4 G/DL (ref 6.8–8.8)
PROT SERPL-MCNC: 8.9 G/DL (ref 6.8–8.8)
RBC # BLD AUTO: 4.42 10E6/UL (ref 4.4–5.9)
RBC # BLD AUTO: 4.56 10E6/UL (ref 4.4–5.9)
SARS-COV-2 RNA RESP QL NAA+PROBE: NEGATIVE
SODIUM SERPL-SCNC: 141 MMOL/L (ref 133–144)
SODIUM SERPL-SCNC: 142 MMOL/L (ref 133–144)
TRIGL SERPL-MCNC: 170 MG/DL
TSH SERPL DL<=0.005 MIU/L-ACNC: 0.76 MU/L (ref 0.4–4)
WBC # BLD AUTO: 6.2 10E3/UL (ref 4–11)
WBC # BLD AUTO: 7.3 10E3/UL (ref 4–11)

## 2022-01-01 PROCEDURE — 84132 ASSAY OF SERUM POTASSIUM: CPT | Performed by: EMERGENCY MEDICINE

## 2022-01-01 PROCEDURE — 258N000003 HC RX IP 258 OP 636: Performed by: EMERGENCY MEDICINE

## 2022-01-01 PROCEDURE — 250N000013 HC RX MED GY IP 250 OP 250 PS 637

## 2022-01-01 PROCEDURE — 80053 COMPREHEN METABOLIC PANEL: CPT | Performed by: EMERGENCY MEDICINE

## 2022-01-01 PROCEDURE — HZ2ZZZZ DETOXIFICATION SERVICES FOR SUBSTANCE ABUSE TREATMENT: ICD-10-PCS | Performed by: PSYCHIATRY & NEUROLOGY

## 2022-01-01 PROCEDURE — 99282 EMERGENCY DEPT VISIT SF MDM: CPT | Performed by: EMERGENCY MEDICINE

## 2022-01-01 PROCEDURE — 250N000011 HC RX IP 250 OP 636: Performed by: PSYCHIATRY & NEUROLOGY

## 2022-01-01 PROCEDURE — 82075 ASSAY OF BREATH ETHANOL: CPT | Performed by: EMERGENCY MEDICINE

## 2022-01-01 PROCEDURE — 250N000013 HC RX MED GY IP 250 OP 250 PS 637: Performed by: PSYCHIATRY & NEUROLOGY

## 2022-01-01 PROCEDURE — 99283 EMERGENCY DEPT VISIT LOW MDM: CPT | Mod: 25 | Performed by: EMERGENCY MEDICINE

## 2022-01-01 PROCEDURE — 96361 HYDRATE IV INFUSION ADD-ON: CPT | Performed by: EMERGENCY MEDICINE

## 2022-01-01 PROCEDURE — H2032 ACTIVITY THERAPY, PER 15 MIN: HCPCS

## 2022-01-01 PROCEDURE — 250N000013 HC RX MED GY IP 250 OP 250 PS 637: Performed by: EMERGENCY MEDICINE

## 2022-01-01 PROCEDURE — 99232 SBSQ HOSP IP/OBS MODERATE 35: CPT

## 2022-01-01 PROCEDURE — 96360 HYDRATION IV INFUSION INIT: CPT | Performed by: EMERGENCY MEDICINE

## 2022-01-01 PROCEDURE — 99285 EMERGENCY DEPT VISIT HI MDM: CPT | Mod: 25 | Performed by: EMERGENCY MEDICINE

## 2022-01-01 PROCEDURE — 36415 COLL VENOUS BLD VENIPUNCTURE: CPT | Performed by: EMERGENCY MEDICINE

## 2022-01-01 PROCEDURE — 83735 ASSAY OF MAGNESIUM: CPT | Performed by: EMERGENCY MEDICINE

## 2022-01-01 PROCEDURE — U0005 INFEC AGEN DETEC AMPLI PROBE: HCPCS | Performed by: EMERGENCY MEDICINE

## 2022-01-01 PROCEDURE — 128N000004 HC R&B CD ADULT

## 2022-01-01 PROCEDURE — 84450 TRANSFERASE (AST) (SGOT): CPT | Performed by: EMERGENCY MEDICINE

## 2022-01-01 PROCEDURE — 99223 1ST HOSP IP/OBS HIGH 75: CPT | Mod: AI | Performed by: PSYCHIATRY & NEUROLOGY

## 2022-01-01 PROCEDURE — 84443 ASSAY THYROID STIM HORMONE: CPT | Performed by: PSYCHIATRY & NEUROLOGY

## 2022-01-01 PROCEDURE — C9803 HOPD COVID-19 SPEC COLLECT: HCPCS | Performed by: EMERGENCY MEDICINE

## 2022-01-01 PROCEDURE — 99285 EMERGENCY DEPT VISIT HI MDM: CPT | Performed by: EMERGENCY MEDICINE

## 2022-01-01 PROCEDURE — 82977 ASSAY OF GGT: CPT | Performed by: PSYCHIATRY & NEUROLOGY

## 2022-01-01 PROCEDURE — 80061 LIPID PANEL: CPT | Performed by: PSYCHIATRY & NEUROLOGY

## 2022-01-01 PROCEDURE — 99239 HOSP IP/OBS DSCHRG MGMT >30: CPT | Performed by: PSYCHIATRY & NEUROLOGY

## 2022-01-01 PROCEDURE — 85025 COMPLETE CBC W/AUTO DIFF WBC: CPT | Performed by: EMERGENCY MEDICINE

## 2022-01-01 PROCEDURE — 80307 DRUG TEST PRSMV CHEM ANLYZR: CPT | Performed by: EMERGENCY MEDICINE

## 2022-01-01 RX ORDER — SODIUM CHLORIDE 9 MG/ML
INJECTION, SOLUTION INTRAVENOUS CONTINUOUS
Status: DISCONTINUED | OUTPATIENT
Start: 2022-01-01 | End: 2022-01-01 | Stop reason: CLARIF

## 2022-01-01 RX ORDER — NICOTINE 21 MG/24HR
1 PATCH, TRANSDERMAL 24 HOURS TRANSDERMAL DAILY
Status: DISCONTINUED | OUTPATIENT
Start: 2022-01-01 | End: 2022-01-01 | Stop reason: HOSPADM

## 2022-01-01 RX ORDER — AMOXICILLIN 250 MG
1 CAPSULE ORAL 2 TIMES DAILY PRN
Status: DISCONTINUED | OUTPATIENT
Start: 2022-01-01 | End: 2022-01-01 | Stop reason: HOSPADM

## 2022-01-01 RX ORDER — ACETAMINOPHEN 325 MG/1
650 TABLET ORAL EVERY 4 HOURS PRN
Status: DISCONTINUED | OUTPATIENT
Start: 2022-01-01 | End: 2022-01-01 | Stop reason: HOSPADM

## 2022-01-01 RX ORDER — SODIUM CHLORIDE 9 MG/ML
INJECTION, SOLUTION INTRAVENOUS CONTINUOUS
Status: DISCONTINUED | OUTPATIENT
Start: 2022-01-01 | End: 2022-01-01 | Stop reason: HOSPADM

## 2022-01-01 RX ORDER — NEOMYCIN/BACITRACIN/POLYMYXINB 3.5-400-5K
OINTMENT (GRAM) TOPICAL 4 TIMES DAILY
Status: DISCONTINUED | OUTPATIENT
Start: 2022-01-01 | End: 2022-01-01 | Stop reason: HOSPADM

## 2022-01-01 RX ORDER — QUETIAPINE FUMARATE 25 MG/1
25 TABLET, FILM COATED ORAL
Status: DISCONTINUED | OUTPATIENT
Start: 2022-01-01 | End: 2022-01-01 | Stop reason: HOSPADM

## 2022-01-01 RX ORDER — VALACYCLOVIR HYDROCHLORIDE 1 G/1
TABLET, FILM COATED ORAL
Qty: 14 TABLET | Refills: 4 | Status: ON HOLD | OUTPATIENT
Start: 2022-01-01 | End: 2022-01-01

## 2022-01-01 RX ORDER — NICOTINE 21 MG/24HR
1 PATCH, TRANSDERMAL 24 HOURS TRANSDERMAL DAILY
Status: DISCONTINUED | OUTPATIENT
Start: 2022-01-01 | End: 2022-01-01

## 2022-01-01 RX ORDER — METOPROLOL SUCCINATE 50 MG/1
50 TABLET, EXTENDED RELEASE ORAL DAILY
Status: DISCONTINUED | OUTPATIENT
Start: 2022-01-01 | End: 2022-01-01 | Stop reason: HOSPADM

## 2022-01-01 RX ORDER — HYDROXYZINE HYDROCHLORIDE 25 MG/1
25 TABLET, FILM COATED ORAL EVERY 4 HOURS PRN
Status: DISCONTINUED | OUTPATIENT
Start: 2022-01-01 | End: 2022-01-01 | Stop reason: HOSPADM

## 2022-01-01 RX ORDER — GABAPENTIN 300 MG/1
300 CAPSULE ORAL 3 TIMES DAILY
Qty: 90 CAPSULE | Refills: 0 | Status: ON HOLD | OUTPATIENT
Start: 2022-01-01 | End: 2023-01-01

## 2022-01-01 RX ORDER — FOLIC ACID 1 MG/1
1 TABLET ORAL DAILY
Status: DISCONTINUED | OUTPATIENT
Start: 2022-01-01 | End: 2022-01-01 | Stop reason: HOSPADM

## 2022-01-01 RX ORDER — NEOMYCIN/BACITRACIN/POLYMYXINB 3.5-400-5K
OINTMENT (GRAM) TOPICAL 4 TIMES DAILY
Qty: 15 G | Refills: 0 | Status: ON HOLD | OUTPATIENT
Start: 2022-01-01 | End: 2023-01-01

## 2022-01-01 RX ORDER — CLONIDINE HYDROCHLORIDE 0.1 MG/1
0.1 TABLET ORAL ONCE
Status: COMPLETED | OUTPATIENT
Start: 2022-01-01 | End: 2022-01-01

## 2022-01-01 RX ORDER — DIAZEPAM 5 MG
5-20 TABLET ORAL EVERY 30 MIN PRN
Status: DISCONTINUED | OUTPATIENT
Start: 2022-01-01 | End: 2022-01-01

## 2022-01-01 RX ORDER — ATENOLOL 50 MG/1
50 TABLET ORAL DAILY PRN
Status: DISCONTINUED | OUTPATIENT
Start: 2022-01-01 | End: 2022-01-01 | Stop reason: HOSPADM

## 2022-01-01 RX ORDER — NICOTINE 21 MG/24HR
1 PATCH, TRANSDERMAL 24 HOURS TRANSDERMAL DAILY
Qty: 30 PATCH | Refills: 0 | Status: ON HOLD | OUTPATIENT
Start: 2022-01-01 | End: 2023-01-01

## 2022-01-01 RX ORDER — LANOLIN ALCOHOL/MO/W.PET/CERES
100 CREAM (GRAM) TOPICAL DAILY
Qty: 30 TABLET | Refills: 0 | Status: ON HOLD | OUTPATIENT
Start: 2022-01-01 | End: 2023-01-01

## 2022-01-01 RX ORDER — LOPERAMIDE HCL 2 MG
2 CAPSULE ORAL 4 TIMES DAILY PRN
Status: DISCONTINUED | OUTPATIENT
Start: 2022-01-01 | End: 2022-01-01 | Stop reason: HOSPADM

## 2022-01-01 RX ORDER — FOLIC ACID 1 MG/1
1 TABLET ORAL DAILY
Status: DISCONTINUED | OUTPATIENT
Start: 2022-01-01 | End: 2022-01-01

## 2022-01-01 RX ORDER — METOPROLOL SUCCINATE 50 MG/1
50 TABLET, EXTENDED RELEASE ORAL DAILY
Qty: 30 TABLET | Refills: 0 | Status: ON HOLD | OUTPATIENT
Start: 2022-01-01 | End: 2023-01-01

## 2022-01-01 RX ORDER — MAGNESIUM HYDROXIDE/ALUMINUM HYDROXICE/SIMETHICONE 120; 1200; 1200 MG/30ML; MG/30ML; MG/30ML
30 SUSPENSION ORAL EVERY 4 HOURS PRN
Status: DISCONTINUED | OUTPATIENT
Start: 2022-01-01 | End: 2022-01-01 | Stop reason: HOSPADM

## 2022-01-01 RX ORDER — DIAZEPAM 5 MG
5-20 TABLET ORAL EVERY 30 MIN PRN
Status: DISCONTINUED | OUTPATIENT
Start: 2022-01-01 | End: 2022-01-01 | Stop reason: HOSPADM

## 2022-01-01 RX ORDER — HYDROXYZINE HYDROCHLORIDE 25 MG/1
25 TABLET, FILM COATED ORAL EVERY 4 HOURS PRN
Qty: 30 TABLET | Refills: 0 | Status: ON HOLD | OUTPATIENT
Start: 2022-01-01 | End: 2023-01-01

## 2022-01-01 RX ORDER — GABAPENTIN 300 MG/1
300 CAPSULE ORAL 3 TIMES DAILY
Status: DISCONTINUED | OUTPATIENT
Start: 2022-01-01 | End: 2022-01-01 | Stop reason: HOSPADM

## 2022-01-01 RX ORDER — ONDANSETRON 4 MG/1
4 TABLET, ORALLY DISINTEGRATING ORAL EVERY 6 HOURS PRN
Status: DISCONTINUED | OUTPATIENT
Start: 2022-01-01 | End: 2022-01-01 | Stop reason: HOSPADM

## 2022-01-01 RX ORDER — MULTIPLE VITAMINS W/ MINERALS TAB 9MG-400MCG
1 TAB ORAL DAILY
Status: DISCONTINUED | OUTPATIENT
Start: 2022-01-01 | End: 2022-01-01 | Stop reason: HOSPADM

## 2022-01-01 RX ORDER — MULTIPLE VITAMINS W/ MINERALS TAB 9MG-400MCG
1 TAB ORAL DAILY
Status: DISCONTINUED | OUTPATIENT
Start: 2022-01-01 | End: 2022-01-01

## 2022-01-01 RX ORDER — MULTIPLE VITAMINS W/ MINERALS TAB 9MG-400MCG
1 TAB ORAL DAILY
Qty: 30 TABLET | Refills: 0 | Status: ON HOLD | OUTPATIENT
Start: 2022-01-01 | End: 2023-01-01

## 2022-01-01 RX ORDER — QUETIAPINE FUMARATE 25 MG/1
25 TABLET, FILM COATED ORAL
Qty: 30 TABLET | Refills: 0 | Status: ON HOLD | OUTPATIENT
Start: 2022-01-01 | End: 2023-01-01

## 2022-01-01 RX ADMIN — GABAPENTIN 300 MG: 300 CAPSULE ORAL at 20:02

## 2022-01-01 RX ADMIN — HYDROXYZINE HYDROCHLORIDE 25 MG: 25 TABLET, FILM COATED ORAL at 08:36

## 2022-01-01 RX ADMIN — DIAZEPAM 10 MG: 5 TABLET ORAL at 16:23

## 2022-01-01 RX ADMIN — GABAPENTIN 300 MG: 300 CAPSULE ORAL at 13:11

## 2022-01-01 RX ADMIN — BACITRACIN ZINC, NEOMYCIN, POLYMYXIN B: 400; 3.5; 5 OINTMENT TOPICAL at 12:20

## 2022-01-01 RX ADMIN — HYDROXYZINE HYDROCHLORIDE 25 MG: 25 TABLET, FILM COATED ORAL at 16:23

## 2022-01-01 RX ADMIN — GABAPENTIN 300 MG: 300 CAPSULE ORAL at 08:36

## 2022-01-01 RX ADMIN — DIAZEPAM 10 MG: 5 TABLET ORAL at 01:02

## 2022-01-01 RX ADMIN — NICOTINE POLACRILEX 2 MG: 2 GUM, CHEWING BUCCAL at 13:08

## 2022-01-01 RX ADMIN — FOLIC ACID 1 MG: 1 TABLET ORAL at 08:40

## 2022-01-01 RX ADMIN — GABAPENTIN 300 MG: 300 CAPSULE ORAL at 13:07

## 2022-01-01 RX ADMIN — MULTIPLE VITAMINS W/ MINERALS TAB 1 TABLET: TAB at 08:36

## 2022-01-01 RX ADMIN — FOLIC ACID 1 MG: 1 TABLET ORAL at 08:36

## 2022-01-01 RX ADMIN — SODIUM CHLORIDE 1000 ML: 9 INJECTION, SOLUTION INTRAVENOUS at 22:37

## 2022-01-01 RX ADMIN — METOPROLOL SUCCINATE 50 MG: 50 TABLET, EXTENDED RELEASE ORAL at 08:36

## 2022-01-01 RX ADMIN — THIAMINE HCL TAB 100 MG 100 MG: 100 TAB at 08:40

## 2022-01-01 RX ADMIN — NICOTINE POLACRILEX 2 MG: 2 GUM, CHEWING BUCCAL at 12:16

## 2022-01-01 RX ADMIN — HYDROXYZINE HYDROCHLORIDE 25 MG: 25 TABLET, FILM COATED ORAL at 16:16

## 2022-01-01 RX ADMIN — GABAPENTIN 300 MG: 300 CAPSULE ORAL at 20:04

## 2022-01-01 RX ADMIN — DIAZEPAM 10 MG: 5 TABLET ORAL at 04:48

## 2022-01-01 RX ADMIN — BACITRACIN ZINC, NEOMYCIN, POLYMYXIN B: 400; 3.5; 5 OINTMENT TOPICAL at 08:00

## 2022-01-01 RX ADMIN — SODIUM CHLORIDE 1000 ML: 9 INJECTION, SOLUTION INTRAVENOUS at 20:30

## 2022-01-01 RX ADMIN — BACITRACIN ZINC, NEOMYCIN, POLYMYXIN B: 400; 3.5; 5 OINTMENT TOPICAL at 16:25

## 2022-01-01 RX ADMIN — MULTIPLE VITAMINS W/ MINERALS TAB 1 TABLET: TAB at 08:40

## 2022-01-01 RX ADMIN — METOPROLOL SUCCINATE 50 MG: 50 TABLET, EXTENDED RELEASE ORAL at 08:40

## 2022-01-01 RX ADMIN — QUETIAPINE FUMARATE 25 MG: 25 TABLET ORAL at 20:04

## 2022-01-01 RX ADMIN — NICOTINE 1 PATCH: 21 PATCH, EXTENDED RELEASE TRANSDERMAL at 08:40

## 2022-01-01 RX ADMIN — THIAMINE HCL TAB 100 MG 100 MG: 100 TAB at 08:36

## 2022-01-01 RX ADMIN — DIAZEPAM 10 MG: 5 TABLET ORAL at 13:11

## 2022-01-01 RX ADMIN — BACITRACIN ZINC, NEOMYCIN, POLYMYXIN B: 400; 3.5; 5 OINTMENT TOPICAL at 20:06

## 2022-01-01 RX ADMIN — NICOTINE POLACRILEX 2 MG: 2 GUM, CHEWING BUCCAL at 08:36

## 2022-01-01 RX ADMIN — HYDROXYZINE HYDROCHLORIDE 25 MG: 25 TABLET, FILM COATED ORAL at 12:16

## 2022-01-01 RX ADMIN — NICOTINE POLACRILEX 4 MG: 4 GUM, CHEWING BUCCAL at 20:42

## 2022-01-01 RX ADMIN — DIAZEPAM 10 MG: 5 TABLET ORAL at 20:04

## 2022-01-01 RX ADMIN — SODIUM CHLORIDE 1000 ML: 9 INJECTION, SOLUTION INTRAVENOUS at 21:34

## 2022-01-01 RX ADMIN — ONDANSETRON 4 MG: 4 TABLET, ORALLY DISINTEGRATING ORAL at 08:40

## 2022-01-01 RX ADMIN — DIAZEPAM 10 MG: 5 TABLET ORAL at 08:40

## 2022-01-01 RX ADMIN — CLONIDINE HYDROCHLORIDE 0.1 MG: 0.1 TABLET ORAL at 11:05

## 2022-01-01 RX ADMIN — NICOTINE POLACRILEX 2 MG: 2 GUM, CHEWING BUCCAL at 16:17

## 2022-01-01 RX ADMIN — NICOTINE POLACRILEX 2 MG: 2 GUM, CHEWING BUCCAL at 18:56

## 2022-01-01 RX ADMIN — DIAZEPAM 10 MG: 5 TABLET ORAL at 10:42

## 2022-01-01 RX ADMIN — NICOTINE 1 PATCH: 21 PATCH, EXTENDED RELEASE TRANSDERMAL at 22:30

## 2022-01-01 ASSESSMENT — ACTIVITIES OF DAILY LIVING (ADL)
ADLS_ACUITY_SCORE: 40
ADLS_ACUITY_SCORE: 37
ORAL_HYGIENE: INDEPENDENT
DRESS: INDEPENDENT
ADLS_ACUITY_SCORE: 40
LAUNDRY: WITH SUPERVISION
ADLS_ACUITY_SCORE: 40
ADLS_ACUITY_SCORE: 37
ADLS_ACUITY_SCORE: 40
ADLS_ACUITY_SCORE: 37
ADLS_ACUITY_SCORE: 40
ADLS_ACUITY_SCORE: 37
ADLS_ACUITY_SCORE: 40
ORAL_HYGIENE: INDEPENDENT
ADLS_ACUITY_SCORE: 37
ADLS_ACUITY_SCORE: 40
HYGIENE/GROOMING: INDEPENDENT
ADLS_ACUITY_SCORE: 37
ADLS_ACUITY_SCORE: 40
ADLS_ACUITY_SCORE: 35
ADLS_ACUITY_SCORE: 40
ADLS_ACUITY_SCORE: 40
DRESS: INDEPENDENT
ADLS_ACUITY_SCORE: 40
LAUNDRY: WITH SUPERVISION
ADLS_ACUITY_SCORE: 40
HYGIENE/GROOMING: INDEPENDENT
ADLS_ACUITY_SCORE: 40
ADLS_ACUITY_SCORE: 40
ORAL_HYGIENE: INDEPENDENT
DRESS: INDEPENDENT
ADLS_ACUITY_SCORE: 40
HYGIENE/GROOMING: INDEPENDENT
LAUNDRY: WITH SUPERVISION
ADLS_ACUITY_SCORE: 40
ADLS_ACUITY_SCORE: 40

## 2022-01-01 ASSESSMENT — ENCOUNTER SYMPTOMS
SLEEP DISTURBANCE: 0
VOMITING: 0
DYSPHORIC MOOD: 0
COUGH: 0
DIFFICULTY URINATING: 0
FEVER: 0
NAUSEA: 0
EYE REDNESS: 0
ABDOMINAL PAIN: 0
NECK PAIN: 0
SHORTNESS OF BREATH: 0
SORE THROAT: 0
BACK PAIN: 0
HEADACHES: 0
WEAKNESS: 0

## 2022-01-01 ASSESSMENT — LIFESTYLE VARIABLES
AUDIT-C TOTAL SCORE: 12
SKIP TO QUESTIONS 9-10: 0

## 2022-01-21 NOTE — PROGRESS NOTES
This is a patient of [facility] and was seen in last 6 months and will be follow by one of their Care Coordinators for Post Discharge Follow up        Follow-up Appointments           Follow Up and recommended labs and tests       Follow up with primary care provider, Amy Starkey MD, within 7 days for hospital follow- up.   Followup with suboxone clinic in 2-5 days                       Your next 10 appointments already scheduled            Feb 28, 2017 1:30 PM CST   Return Visit with Amy Starkey MD   [facility] ([facility])     [address] [place] [address]  [address]  [facility] [id number] [phone]           
Satisfactory

## 2022-03-15 NOTE — TELEPHONE ENCOUNTER
valACYclovir (VALTREX) 1000 mg tablet      Last Written Prescription Date:  6-  Last Fill Quantity: 14,   # refills: 4  Last Office Visit : 6-  Future Office visit:  none    Routing refill request to provider for review/approval because:  Medication is ordered as daily  Not as prn and not as a certain number of days.  Qty indicates it is prn and for a limited time use.        Kathleen M Doege RN

## 2022-10-12 NOTE — DISCHARGE INSTRUCTIONS
Behavioral Discharge Planning and Instructions  THANK YOU FOR CHOOSING THE [facility]  3AW  [phone]    Summary: You were admitted to [facility] on 10/11/2022 for detoxification from alcohol.  A medical exam was performed that included lab work. You have met with a  and opted to return home, continue working with your recovery supports.  Please take care and make your recovery a priority, Frank!    Recommendation:  Remain abstinent from all mood-altering chemicals except those prescribed by a medical provider. If problems persist, consider obtaining a chemical dependency assessment (call [phone] to schedule a virtual assessment with [place]) and following all recommendations.      Main Diagnosis: Per Dr. Gautam Jimenez MD  {Substance Related Use Disorders:345643}    Major Treatments, Procedures and Findings:  You were detoxed from alcohol with the Modified Selective Severity Protocol using Valium. You have met with a  to develop a treatment plan for discharge.  You have had labs drawn and a copy of those labs will be sent home with you.  Please bring your lab results with to your follow up doctor appointment.    Symptoms to Report:  If you experience more anxiety, confusion, sleeplessness, deep sadness or thoughts of suicide, notify your treatment team or notify your primary care physician. IF ANY OF THE SYMPTOMS YOU ARE EXPERIENCING ARE A MEDICAL EMERGENCY CALL 911 IMMEDIATELY.     If you or someone you know is struggling or in crisis, help is available.  Call or text 858 or chat  at Snaptu.org    Lifestyle Adjustment: Adjust your lifestyle to get enough sleep, relaxation, exercise and  good nutrition. Continue to develop healthy coping skills to decrease stress and promote a sober living environment. Do not use alcohol, illegal drugs or addictive medications other than what is currently prescribed. AA, NA, and  Sponsor are excellent  "resources for support.     Primary Provider:    Disposition: Home      Facts about COVID19 at www.cdc.gov/COVID19 and www.[place].gov/covid19    Keeping hands clean is one of the most important steps we can take to avoid getting sick and spreading germs to others.  Please wash your hands frequently and lather with soap for at least 20 seconds!    Follow-up Appointment:   Appointment Date/Time: ***    Psychiatrist/Primary Care Giver: ***    Address: ***    Phone Number: ***      Resources:     Resources for on line recovery meetings:  AA meetings can be found online; search for them at: http://aaXogen Technologiesintergroup.org/directory.php  AA meetings via ZOOM for [place] area can be found online at: https://aaOneTwoSee.org/find-a-meeting/holiday-closings/  NA meetings via Z[place]OM for [place] area can be found online at: https://sites.Bandcamp.com/view/[place]regionofnarcoticsanonymous/home?authuser=2    Www.ClickandBuy  has online resources for meeting and recovery care including Podcast \"Let's Talk:Addiction & Recovery Podcasts    Www.mnrecSoldsie.org     DISCHARGE RESOURCES:  -SMART Recovery - self management for addiction recovery:  www.smartrecovery.org    -Pathways ~ A Health Crisis Resource & Support Center: 976.591.1801.  -[facility] [phone]   -[facility] Behavioral Intake [phone] or [phone].  -Suicide Awareness Voices of Education (SAVE) (www.save.org): 958-920-LIDY (9341)  -National Suicide Prevention Line (www.mentalhealthmn.org): 237-248-URIQ (0386)  -National Mercer on Mental Illness (www.[place].balta.org): 365.326.6153 or 261-735-4259.  -Hcpo2nknl: text the word LIFE to 41610 for immediate support and crisis intervention  -Mental Health Consumer/Survivor Network of [place] (www.mhcsn.net): 318.249.3428 or 361-354-1868  -Mental Health Association of [place] (www.mentalhealth.org): 685.598.4558 or 756-976-0558     -Substance Abuse and Mental Health Services (www.sama.gov)  -Harm Reduction Coalition " (www. Harmreduction.org)  -www.prescribetoprevent.org or http://prescribetoprevent.org/video  -Poison control 1-359.713.4272   **[place] Opioid Prevention Coalition: www.opioidcoalition.org    Sober Support Group Information:  AA/NA & Sponsor/Support  -Alcoholics Anonymous (www.alcoholics-anonymous.org): for local information 24 hours/day  -AA Intergroup service office in [place] (http://www.aastpaul.org/) 201.875.2911  -AA Intergroup service office in [facility]: 535.181.4451. (http://www.aamin[place].org/)  -Narcotics Anonymous (www.naminn[place].org) (870) 982-9506   **Sober Fun Activities: www.soberQuarri Technologiesactivities.IntelligentMDx/[facility]//[facility] Recovery Connection ([facility])  [facility] connects people seeking recovery to resources that help foster and sustain long-term recovery.  Whether you are seeking resources for treatment, transportation, housing, job training, education, health care or other pathways to recovery, [facility] is a great place to start.    Phone: (347) 687-1566.  www.[employer]Fat [employer]aniel [employer].AudioBoo (Great listing of all types of recovery and non-recovery related resources)      General Medication Instructions:   See your medication sheet(s) for instructions.   Take all medicines as directed.  Make no changes unless your doctor suggests them.   Go to all your doctor visits.  Be sure to have all your required lab tests. This way, your medicines can be refilled on time.  Do not use any drugs not prescribed by your provider.  AA/NA and Sponsors are excellent resources for support  Avoid alcohol.    Any follow up concerns:  Nursing questions call the [facility] [phone]  Medical Record call [phone]  Outpatient Behavioral Intake call [phone]  LP+ Wait List/Bed Availability call [phone]    The entire treatment team has appreciated the opportunity to work with you Frank.  We wish you the best in the future and with your lifelong recovery goals. Please bring this discharge folder  with you to all follow up appointments.  It contains your lab results, diagnosis, medication list and discharge recommendations.    THANK YOU FOR CHOOSING THE [facility]

## 2022-10-12 NOTE — PLAN OF CARE
Behavioral Team Discussion: (10/12/2022)    Continued Stay Criteria/Rationale: Patient admitted for alcohol withdrawal, complicated.  Plan: The following services will be provided to the patient; psychiatric assessment, medication management, therapeutic milieu, individual and group support, and skills groups.   Participants: 3A Provider: Dr. Gautam Jimenez MD; 3A RN: Michele Wynn RN; 3A CM's: Jeanie Thorpe.  Summary/Recommendation: Providers will assess today for treatment recommendations, discharge planning, and aftercare plans. CM will meet with pt for discharge planning.   Medical/Physical: Internal medicine consult to be completed 10/12/2022.  Precautions:   Behavioral Orders   Procedures     Code 1 - Restrict to Unit     Routine Programming     As clinically indicated     Seizure precautions     Status 15     Every 15 minutes.     Withdrawal precautions     Rationale for change in precautions or plan: N/A  Progress: No Change.    ASAM Dimension Scale Ratings:  Dimension 1: 3 Client tolerates and paula with withdrawal discomfort poorly. Client has severe intoxication, such that the client endangers self or others, or intoxication has not abated with less intensive levels of services. Client displays severe signs and symptoms; or risk of severe, but manageable withdrawal; or withdrawal worsening despite detox at less intensive level.  Dimension 2: 2 Client has difficulty tolerating and coping with physical problems or has other biomedical problems that interfere with recovery and treatment. Client neglects or does not seek care for serious biomedical problems.  Dimension 3: 2 Client has difficulty with impulse control and lacks coping skills. Client has thoughts of suicide or harm to others without means; however, the thoughts may interfere with participation in some treatment activities. Client has difficulty functioning in significant life areas. Client has moderate symptoms of emotional,  behavioral, or cognitive problems. Client is able to participate in most treatment activities.  Dimension 4: 2 Client displays verbal compliance, but lacks consistent behaviors; has low motivation for change; and is passively involved in treatment.  Dimension 5: 4 No awareness of the negative impact of mental health problems or substance abuse. No coping skills to arrest mental health or addiction illnesses, or prevent relapse.  Dimension 6: 2 Client is engaged in structured, meaningful activity, but peers, family, significant other, and living environment are unsupportive, or there is criminal justice involvement by the client or among the client's peers, significant others, or in the client's living environment.

## 2022-10-12 NOTE — PLAN OF CARE
Problem: Alcohol Withdrawal  Goal: Alcohol Withdrawal Symptom Control  10/12/2022 1328 by Melina Flores, RN  Outcome: Progressing    Patient has been withdrawn and isolative. He was cooperative.     MSSA=9,9 and 9; total of 30mg of valium was administered. Poor appetite for breakfast; zofran was administered this morning. Patient refused lunch stating that he was still nauseous. Patient was approached to be given another dose of zofran, but he was asleep.     Anxiety 8/10; scheduled gabapentin was administered. Patient denied depression and SI/SIB.    BP was elevated at 184/74; scheduled metoprolol was administered. BP recheck was 159/108; clonidine 0.1mg was administered. Last BP was 137/95.    We'll continue to monitor.

## 2022-10-12 NOTE — ED PROVIDER NOTES
ED Provider Note  [facility]      History     Chief Complaint   Patient presents with     Addiction Problem     HPI  Frank Arguello is a 37 year old male who presents to the emergency department seeking detox from alcohol.  Patient states that he relapsed on alcohol soon after his last detox admission.  He drinks up to a liter of vodka per day and a couple beers.  The patient has a history of alcohol withdrawal including tremulousness, seizures, and DTs.  Patient states his last seizure was approximately 4 months ago.  Patient does state that he did fall 4 days ago while doing some yard work while drinking.  He struck his face on a cinderblock wall and sustained a laceration to the lip.  CT imaging at [facility] was negative for intracranial hemorrhage and cervical spine fracture.  The patient denies any depression or suicidal ideation.  He denies any recent illness or medical concerns.  He last drank within an hour of arrival to the emergency department    Past Medical History  Past Medical History:   Diagnosis Date     Anxiety      Depressive disorder      Hypertension      Pancreatitis      Pseudocyst of pancreas      Substance abuse (H)      Past Surgical History:   Procedure Laterality Date     ABDOMEN SURGERY       ENDOSCOPIC RETROGRADE CHOLANGIOPANCREATOGRAM  4/9/2014    Procedure: ENDOSCOPIC RETROGRADE CHOLANGIOPANCREATOGRAM;   Endoscopic Ultrasound with Fine needle aspiration, EsophagoGastroDuodenoscopy with stent placement into pseudeocyst, balloon dilation.  ;  Surgeon: Jagjit Dow MD;  Location: [facility] OR     ESOPHAGOSCOPY, GASTROSCOPY, DUODENOSCOPY (EGD), COMBINED  4/9/2014    Procedure: COMBINED ENDOSCOPIC ULTRASOUND, ESOPHAGOSCOPY, GASTROSCOPY, DUODENOSCOPY (EGD), FINE NEEDLE ASPIRATE/BIOPSY;;  Surgeon: Jagjit Dow MD;  Location: [facility] OR     ESOPHAGOSCOPY, GASTROSCOPY, DUODENOSCOPY (EGD), COMBINED  6/24/2014    Procedure: COMBINED ESOPHAGOSCOPY, GASTROSCOPY,  DUODENOSCOPY (EGD);  Surgeon: Jagjit Dow MD;  Location: [facility] OR     gabapentin (NEURONTIN) 300 MG capsule  metoprolol succinate ER (TOPROL-XL) 50 MG 24 hr tablet  multivitamin, therapeutic with minerals (THERA-VIT-M) TABS  QUEtiapine (SEROQUEL) 25 MG tablet  valACYclovir (VALTREX) 1000 mg tablet      Allergies   Allergen Reactions     Trazodone Other (See Comments)     erection     Family History  Family History   Problem Relation Age of Onset     Alcohol/Drug Paternal Grandfather      Alcohol/Drug Father      Substance Abuse Father      Hypertension Mother      Other - See Comments Brother          of heroin overdose     Substance Abuse Brother      Hyperlipidemia Maternal Grandmother      Diabetes No family hx of      Cancer No family hx of      Unknown/Adopted No family hx of      Depression No family hx of      Anxiety Disorder No family hx of      Schizophrenia No family hx of      Bipolar Disorder No family hx of      Suicide No family hx of      Dementia No family hx of      Keon Disease No family hx of      Parkinsonism No family hx of      Autism Spectrum Disorder No family hx of      Intellectual Disability (Mental Retardation) No family hx of      Mental Illness No family hx of      Suicidality Brother      Social History   Social History     Tobacco Use     Smoking status: Some Days     Packs/day: 0.50     Types: Cigarettes   Substance Use Topics     Alcohol use: Yes     Comment: 1 pint and 6 beers per day     Drug use: Not Currently      Past medical history, past surgical history, medications, allergies, family history, and social history were reviewed with the patient. No additional pertinent items.       Review of Systems   Constitutional: Negative for fever.   HENT: Negative for congestion and sore throat.    Eyes: Negative for redness.   Respiratory: Negative for cough and shortness of breath.    Cardiovascular: Negative for chest pain.   Gastrointestinal: Negative for abdominal  "pain, nausea and vomiting.   Genitourinary: Negative for difficulty urinating.   Musculoskeletal: Negative for back pain and neck pain.   Skin: Negative for rash.   Neurological: Negative for weakness and headaches.   Psychiatric/Behavioral: Negative for dysphoric mood, sleep disturbance and suicidal ideas.   All other systems reviewed and are negative.    A complete review of systems was performed with pertinent positives and negatives noted in the HPI, and all other systems negative.    Physical Exam   BP: 124/87  Pulse: 119  Temp: 98.9  F (37.2  C)  Resp: 16  Height: 190.5 cm (6' 3\")  Weight: 92 kg (202 lb 14.4 oz)  SpO2: 97 %  Physical Exam  Vitals and nursing note reviewed.   Constitutional:       General: He is not in acute distress.     Appearance: Normal appearance. He is not diaphoretic.   HENT:      Head: Normocephalic and atraumatic.      Mouth/Throat:      Mouth: Oropharynx is clear and moist.   Eyes:      General: No scleral icterus.     Pupils: Pupils are equal, round, and reactive to light.   Cardiovascular:      Rate and Rhythm: Normal rate and regular rhythm.      Pulses: Normal pulses and intact distal pulses.      Heart sounds: Normal heart sounds.   Pulmonary:      Effort: Pulmonary effort is normal. No respiratory distress.      Breath sounds: Normal breath sounds.   Abdominal:      General: Bowel sounds are normal.      Palpations: Abdomen is soft.      Tenderness: There is no abdominal tenderness.   Musculoskeletal:         General: No tenderness or edema. Normal range of motion.   Skin:     General: Skin is warm.      Capillary Refill: Capillary refill takes less than 2 seconds.      Findings: No rash.   Neurological:      General: No focal deficit present.      Mental Status: He is alert.      Motor: No weakness.      Coordination: Coordination normal.   Psychiatric:         Mood and Affect: Mood normal.         Speech: Speech is slurred.         Behavior: Behavior normal.         Thought " Content: Thought content does not include suicidal ideation.         ED Course      Procedures       The medical record was reviewed and interpreted.  Current labs reviewed and interpreted.          Results for orders placed or performed during the hospital encounter of 10/11/22   Asymptomatic COVID-19 Virus (Coronavirus) by PCR Nasopharyngeal     Status: Normal    Specimen: Nasopharyngeal; Swab   Result Value Ref Range    SARS CoV2 PCR Negative Negative    Narrative    Testing was performed using the Xpert Xpress SARS-CoV-2 Assay on the   Cepheid Gene-Xpert Instrument Systems. Additional information about   this Emergency Use Authorization (EUA) assay can be found via the Lab   Guide. This test should be ordered for the detection of SARS-CoV-2 in   individuals who meet SARS-CoV-2 clinical and/or epidemiological   criteria. Test performance is unknown in asymptomatic patients. This   test is for in vitro diagnostic use under the FDA EUA for   laboratories certified under CLIA to perform high complexity testing.   This test has not been FDA cleared or approved. A negative result   does not rule out the presence of PCR inhibitors in the specimen or   target RNA in concentration below the limit of detection for the   assay. The possibility of a false negative should be considered if   the patient's recent exposure or clinical presentation suggests   COVID-19. This test was validated by the [facility]. This laboratory is certified under the Clinical Laboratory Improvement Amendments of 1988 (CLIA-88) as qualified to perform high complexity laboratory testing.     Comprehensive metabolic panel     Status: Abnormal   Result Value Ref Range    Sodium 141 133 - 144 mmol/L    Potassium 4.6 3.4 - 5.3 mmol/L    Chloride 104 94 - 109 mmol/L    Carbon Dioxide (CO2) 29 20 - 32 mmol/L    Anion Gap 8 3 - 14 mmol/L    Urea Nitrogen 8 7 - 30 mg/dL    Creatinine 0.69 0.66 - 1.25 mg/dL    Calcium  9.2 8.5 - 10.1 mg/dL    Glucose 147 (H) 70 - 99 mg/dL    Alkaline Phosphatase 79 40 - 150 U/L    AST 68 (H) 0 - 45 U/L    ALT 52 0 - 70 U/L    Protein Total 8.9 (H) 6.8 - 8.8 g/dL    Albumin 4.5 3.4 - 5.0 g/dL    Bilirubin Total 0.4 0.2 - 1.3 mg/dL    GFR Estimate >90 >60 mL/min/1.73m2   Magnesium     Status: Abnormal   Result Value Ref Range    Magnesium 2.4 (H) 1.6 - 2.3 mg/dL   CBC with platelets and differential     Status: None   Result Value Ref Range    WBC Count 7.3 4.0 - 11.0 10e3/uL    RBC Count 4.42 4.40 - 5.90 10e6/uL    Hemoglobin 14.6 13.3 - 17.7 g/dL    Hematocrit 41.5 40.0 - 53.0 %    MCV 94 78 - 100 fL    MCH 33.0 26.5 - 33.0 pg    MCHC 35.2 31.5 - 36.5 g/dL    RDW 12.5 10.0 - 15.0 %    Platelet Count 278 150 - 450 10e3/uL    % Neutrophils 65 %    % Lymphocytes 25 %    % Monocytes 9 %    % Eosinophils 0 %    % Basophils 1 %    % Immature Granulocytes 0 %    NRBCs per 100 WBC 0 <1 /100    Absolute Neutrophils 4.6 1.6 - 8.3 10e3/uL    Absolute Lymphocytes 1.8 0.8 - 5.3 10e3/uL    Absolute Monocytes 0.7 0.0 - 1.3 10e3/uL    Absolute Eosinophils 0.0 0.0 - 0.7 10e3/uL    Absolute Basophils 0.1 0.0 - 0.2 10e3/uL    Absolute Immature Granulocytes 0.0 <=0.4 10e3/uL    Absolute NRBCs 0.0 10e3/uL   Drug abuse screen 1 urine (ED)     Status: Abnormal   Result Value Ref Range    Amphetamines Urine Screen Positive (A) Screen Negative    Barbiturates Urine Screen Negative Screen Negative    Benzodiazepines Urine Screen Positive (A) Screen Negative    Cannabinoids Urine Screen Negative Screen Negative    Cocaine Urine Screen Negative Screen Negative    Opiates Urine Screen Negative Screen Negative   Potassium     Status: Normal   Result Value Ref Range    Potassium 4.0 3.4 - 5.3 mmol/L   AST     Status: None   Result Value Ref Range    AST     Alcohol breath test POCT     Status: Abnormal   Result Value Ref Range    Alcohol Breath Test 0.325 (A) 0.00 - 0.01   Urine Drugs of Abuse Screen     Status: Abnormal     Narrative    The following orders were created for panel order Urine Drugs of Abuse Screen.  Procedure                               Abnormality         Status                     ---------                               -----------         ------                     Drug abuse screen 1 urin...[[id number]]  Abnormal            Final result                 Please view results for these tests on the individual orders.   CBC with platelets differential     Status: None    Narrative    The following orders were created for panel order CBC with platelets differential.  Procedure                               Abnormality         Status                     ---------                               -----------         ------                     CBC with platelets and d...[[id number]]                      Final result                 Please view results for these tests on the individual orders.     Medications   0.9% sodium chloride BOLUS (0 mLs Intravenous Stopped 10/11/22 2048)        Assessments & Plan (with Medical Decision Making)   37 year old male to the emergency department seeking detox from alcohol.  He reports daily alcohol use for the past 9 months.  Reports a history of alcohol withdrawal including seizures, tremors, and DTs.  Reports his last seizure was 4 months ago.  He did fall 4 days ago and sustained a facial laceration.  He was seen at [facility] where he underwent CT imaging which was unremarkable.  The patient denies any other substance use.  His ALK level is elevated at 20 325 on arrival to the emergency department.  His labs remarkable for mild AST elevation.  His toxicology screen is also positive for benzodiazepines and amphetamines.  He does endorse occasional Adderall use.  He denies any benzodiazepine use but he was given Valium while admitted overnight at [facility] on 9/28.  Patient does not have any medical concerns.  His physical examination is otherwise unrevealing.  Patient appears  medically stable for detox admission.    I have reviewed the nursing notes. I have reviewed the findings, diagnosis, plan and need for follow up with the patient.    New Prescriptions    No medications on file       Final diagnoses:   Alcohol dependence with uncomplicated intoxication (H)     Chart documentation was completed with Dragon voice-recognition software. Even though reviewed, this chart may still contain some grammatical, spelling, and word errors.     --  Ramiro Haywood Md  [facility] EMERGENCY DEPARTMENT  10/11/2022     Ramiro Haywood MD  10/11/22 6530

## 2022-10-12 NOTE — PHARMACY-ADMISSION MEDICATION HISTORY
Admission Medication History Completed by Pharmacy    See [facility] Admission Navigator for allergy information, preferred outpatient pharmacy, prior to admission medications and immunization status.     Medication History Sources:     Patient and Dispense History    Changes made to PTA medication list (reason):    Added: None    Deleted: None    Changed: Valtrex to BID PRN for outbreaks    Additional Information:    Pt reported taking  mg Seroquel at bedtime. Script is written for up to 50 mg at bedtime.     Prior to Admission medications    Medication Sig Last Dose Taking? Auth Provider Long Term End Date   gabapentin (NEURONTIN) 300 MG capsule TAKE 3 CAPSULES BY MOUTH 3 TIMES DAILY More than a month Yes Amy Starkey MD Yes    metoprolol succinate ER (TOPROL-XL) 50 MG 24 hr tablet Take 1 tablet (50 mg) by mouth daily Past Month Yes Leonora Torres NP Yes    multivitamin, therapeutic with minerals (THERA-VIT-M) TABS Take 1 tablet by mouth daily 10/11/2022 Yes Gautam Jimenez MD     QUEtiapine (SEROQUEL) 25 MG tablet Take 1 tablet (25 mg) by mouth at bedtime as needed, may repeat once (for sleep as needed) Past Week Yes Leonora Torres NP Yes    valACYclovir (VALTREX) 1000 mg tablet TAKE 1 TABLET BY MOUTH TWICE A DAY  Patient taking differently: 1,000 mg 2 times daily as needed (Outbreak) Past Month Yes Leonora Torres NP Yes        Date completed: 10/12/22    Medication history completed by: Ashley Garcia [facility]             Detail Level: Detailed Detail Level: Zone

## 2022-10-12 NOTE — CONSULTS
TELEPSYCHIATRY TELEPHONE NOTE    Discussed with nurse.  37-year-old male presented with alcohol withdrawal.  No SI/HI.  Patient has been medically cleared.  PMH significant for s/p fall/head trauma.  History of withdrawal seizures/DTs.  Allergy to trazodone.      Lab findings: COVID negative. Urine drug screen: +amphetamine/benzo. Alcohol level: 0.325.     PLAN:    --Disposition: Admit to detox.  Routine admission orders placed.  --Precautions: Withdrawal.    --Medications: Restart confirmed home medications.          James Hackett MD  Psychiatrist

## 2022-10-12 NOTE — PROGRESS NOTES
"Met with pt to discuss discharge planning. Pt reports his plan is to return home, contniue working with his recovery supports including his AA home group, sponsor, girlfriend. Pt reports he relapsed about 90 days ago. Pt reports this was triggered by his friend's wife \"blowing her head off\" and pt trying to support his friend.     Records indicate pt overdosed 9/28 on methadone. Pt reports he was intoxicated and found a bottle of methadone in his house and drank it without thinking about the dosage. Pt reports he has previously struggled with heroin use and been on methadone maintenance but had tapered off about 6 months prior to the overdose.     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 Pt reports he lives alone in a home he just purchased but his girlfriend lives right next to him and his home group is just up the street.    Pt declines any referral to treatment, stating he knows what he needs to do to be sober. When writer shared concern that pt has been unable to get sober with these tools in the last 90 days, pt stated \"well I haven't really tried to get sober, to be honest\". Pt reports he has tried therapy in the past and didn't like it, found it \"monotonous\". Pt declines any resources at this time.     AVS updated.     Jeanie Thorpe, [facility], LADC   "

## 2022-10-12 NOTE — ED TRIAGE NOTES
Pt. here seeking detox from alcohol.  Pt. states drinks a pint and 6 beers per day.  Last drink 20 min ago.  Pt. states hx. of withdrawal seizures.   Triage Assessment     Row Name 10/11/22 1947       Triage Assessment (Adult)    Airway WDL WDL       Respiratory WDL    Respiratory WDL WDL       Skin Circulation/Temperature WDL    Skin Circulation/Temperature WDL WDL       Cardiac WDL    Cardiac WDL X;rhythm    Pulse Rate & Regularity tachycardic       Peripheral/Neurovascular WDL    Peripheral Neurovascular WDL WDL       Cognitive/Neuro/Behavioral WDL    Cognitive/Neuro/Behavioral WDL WDL

## 2022-10-12 NOTE — PLAN OF CARE
"  Problem: Alcohol Withdrawal  Goal: Alcohol Withdrawal Symptom Control  Outcome: Adequate for Care Transition     Plan of Care Reviewed with patient     Overall Patient Progress: Improving     Patient is on ongoing detox management with valium    Patient has been visible in the milieu this evening.    He reports eating about 100% of his dinner and was encouraged drink more fluid.    Pt denied depression, SI/SIB/HI endorsed anxiety 8/10. His affect remains flat and blunted.    Pt  denied pain and vital signs were more stable      1600 vitals: BP (!) 138/91 (BP Location: Left arm)   Pulse 76   Temp 97.2  F (36.2  C) (Temporal)   Resp 16   Ht 1.905 m (6' 3\")   Wt 99.8 kg (220 lb)   SpO2 98%   BMI 27.50 kg/m       MSSA: 8 and 8     PRNs administered this shift: valium 10 mg x2     Discharge plans: Pt has plans of detox only.     " yes

## 2022-10-12 NOTE — PROGRESS NOTES
10/12/22 1700   Group Therapy Session   Group Attendance attended group session   Time Session Began 1645   Time Session Ended 1730   Total Time (minutes) 45   Total # Attendees 6   Group Type recreation   Group Topic Covered leisure exploration/use of leisure time   Group Session Detail TR leisure group   Patient Response/Contribution cooperative with task   Patient Participation Detail Pt attended the structured Therapeutic Recreation group, participating in a group activity. Pt participated in group discussion and leisure participation as a healthy outlet to gain self-esteem, manage behaviors, improve social skills, and decrease isolation.  Pt remained focused and engaged throughout group activity.  Pt participated in the group discussion about healthy outlets and participated in the group activity, contributing to the clues and descriptions for the game.

## 2022-10-12 NOTE — H&P
"Frank Arguello is a 37 year old male    History was provided by PATEINT who was a [facility]  historian.   CHIEF COMPLAINT:  alvohol  Alcohol  HISTORY OF PRESENT ILLNESS:      As per emergency room notes  \"    Addiction Problem      HPI  Frank Arguello is a 37 year old male who presents to the emergency department seeking detox from alcohol.  Patient states that he relapsed on alcohol soon after his last detox admission.  He drinks up to a liter of vodka per day and a couple beers.  The patient has a history of alcohol withdrawal including tremulousness, seizures, and DTs.  Patient states his last seizure was approximately 4 months ago.  Patient does state that he did fall 4 days ago while doing some yard work while drinking.  He struck his face on a cinderblock wall and sustained a laceration to the lip.  CT imaging at [facility] was negative for intracranial hemorrhage and cervical spine fracture.  The patient denies any depression or suicidal ideation.  He denies any recent illness or medical concerns.  He last drank within an hour of arrival to the emergency department\"       During my interview the patient:  Patient is a 37-year-old  male who is known to us from previous admissions when he was abusing opiates.  This is the second hospitalization in less than a month and patient had 3 ED visits.  On 9/28 9/30 patient had overdosed on methadone.  He was given Narcan and discharged after being hospitalized.  He came to the emergency room on 10 /7 when he fell down intoxicated and had struck his face he has sutures on his lips.    Patient presented to the emergency room after he he received a citation at work for being belligerent.  Patient reports he is sober from alcohol for 2 years.  His friend's wife killed himself he was helping his friend.  He relapsed to drinking.  He gradually increased to 1 pint a day.  He is having withdrawal symptoms feeling shaky sweaty cold.    Patient has been using the " following substances: Alcohol  Started at age 17, became a problem at 27      Patient has tolerance, withdrawal, progressive use, loss of control, spending more time and more amount than intended. Patient has made attempts to quit, is experiencing cravings, and reports negative consequences.                 Alcohol    USE DISORDER - CRITERIA  +admits to taking larger amounts than initially intended  +admits to unsuccessful efforts to cut down or control use  +admits to spending a great deal of time in activities necessary to obtain, use and recover from  +admits to cravings or a strong desire to use   + admits to failure to fulfill obligations at work, school or home  + admits to continued use despite negative consequences  + admits to giving up important activities to use  +admits to use in situations in which it is physically dangerous        Patient does have a history of seizures.  Patient does have a history of delirium tremens.           Patient has a prior history to opiates as below  Denies thoughts of suicide or harming others.      Denies auditory or visual hallucinations.     Patient smokes 1ppd    Patient denied any gambling    Substance Age first use First became regular or problematic Most recent use   Alcohol         Cannabis none     Cocaine NONE       Stimulants NONE       Opioids 22   has a history of abusing pain pills switched to heroin he was on methadone maintenance but he also got off of methadone maintenance since approximately when he was 29   9/30 he took remaining methadone that he had at home   Sedatives 29   patient had taken Xanax occasionally   29   Hallucinogens NONE       Inhalants NONE       Other         OTC drugs NONE       Nicotine         Patient does have a history of overdose.  Patient does have a history of IV use.  Patient does not have a history of hepatitis, HIV,      PSYCHIATRIC REVIEW OF SYSTEMS:         Psychiatric Review of Systems:   Depression:     Denies: depressed  mood, suicidal ideation, decreased interest, changes in sleep, changes in appetite, guilt, hopelessness, helplessness, impaired concentration, decreased energy, irritability.  Colette:       Denies: sleeplessness, increased goal-directed activities, abrupt increase in energy, pressured speech   impulsiveness, racing thoughts, increased goal-directed activities, pressured speech, increase in energy  Colette Feeling euphoric,Distractible,Impulsive,Grandiose,Talking excessively,Have energy without sleeping,Mood swings,Irritability  Psychosis:   .  Denies: visual hallucinations, auditory hallucinations, paranoia  Anxiety: h/o mci  Anxious , cant stop thnking racing thoughts, poor sleep      Reports  Panic attacks  Focus in , sob,     Palpitation pounding heart trembling  shortness of breath feeling of choking chest pain  No nausea feeling dizzy chills numbness     Come out of the blue    Denies: worries that are difficult to control for the past 6 months, panic attacks      PTSD: Patient was exposed to a traumatic event  Pt ptsd is stable   H/o  re-experiencing past trauma, nightmares, trust issues, flashbacks,increased arousal, avoidance of traumatic stimuli,   OCD:   denies obsessions, patient has compulsions checking, counting symmetry, cleaning, skin picking.    ED:     Denies: restriction, binging, purging.         patient denies :symptoms of attention deficit disorder include a failure to pay attention to detail, a pattern of careless mistakes, a pattern of inattentive listening, a failure to follow through with projects, poor personal organization, losing necessary objects, distractibility, forgetfulness.                    PSYCHIATRIC HISTORY     Previous diagnoses: ptsd, mic          Past court commitments: none  SIB /SUICIDE ATTEMPTS NONE  Psych Hosp :none  Outpatient Programs none  Inpatient cd trt 2 lp   Out pt cd trt 2  Detoxes 3-4  PAST PSYCH MED TRIALS     Methadone     SOCIAL HISTORY                                                                              Patient is single  Patient has 0  children  Patient is employed as a     Patient's housing is own         Family History:   FAMILY HISTORY:   Family History   Problem Relation Age of Onset     Alcohol/Drug Paternal Grandfather      Alcohol/Drug Father      Substance Abuse Father      Hypertension Mother      Other - See Comments Brother          of heroin overdose     Substance Abuse Brother      Hyperlipidemia Maternal Grandmother      Diabetes No family hx of      Cancer No family hx of      Unknown/Adopted No family hx of      Depression No family hx of      Anxiety Disorder No family hx of      Schizophrenia No family hx of      Bipolar Disorder No family hx of      Suicide No family hx of      Dementia No family hx of      Thurston Disease No family hx of      Parkinsonism No family hx of      Autism Spectrum Disorder No family hx of      Intellectual Disability (Mental Retardation) No family hx of      Mental Illness No family hx of      Suicidality Brother      Family Mental Health History-  none    Substance Use Problems - present for              PTA Medications:     Medications Prior to Admission   Medication Sig Dispense Refill Last Dose     gabapentin (NEURONTIN) 300 MG capsule TAKE 3 CAPSULES BY MOUTH 3 TIMES DAILY 126 capsule 0 Unknown     metoprolol succinate ER (TOPROL-XL) 50 MG 24 hr tablet Take 1 tablet (50 mg) by mouth daily 90 tablet 3 Unknown     multivitamin, therapeutic with minerals (THERA-VIT-M) TABS Take 1 tablet by mouth daily 30 each 0 10/11/2022     QUEtiapine (SEROQUEL) 25 MG tablet Take 1 tablet (25 mg) by mouth at bedtime as needed, may repeat once (for sleep as needed) 30 tablet 3 Past Week     valACYclovir (VALTREX) 1000 mg tablet TAKE 1 TABLET BY MOUTH TWICE A DAY 14 tablet 4 Past Month          Allergies:     Allergies   Allergen Reactions     Trazodone Other (See Comments)     erection          Labs:     Recent Results  (from the past 48 hour(s))   Alcohol breath test POCT    Collection Time: 10/11/22  7:56 PM   Result Value Ref Range    Alcohol Breath Test 0.325 (A) 0.00 - 0.01   Asymptomatic COVID-19 Virus (Coronavirus) by PCR Nasopharyngeal    Collection Time: 10/11/22  9:21 PM    Specimen: Nasopharyngeal; Swab   Result Value Ref Range    SARS CoV2 PCR Negative Negative   Comprehensive metabolic panel    Collection Time: 10/11/22  9:21 PM   Result Value Ref Range    Sodium 141 133 - 144 mmol/L    Potassium 4.6 3.4 - 5.3 mmol/L    Chloride 104 94 - 109 mmol/L    Carbon Dioxide (CO2) 29 20 - 32 mmol/L    Anion Gap 8 3 - 14 mmol/L    Urea Nitrogen 8 7 - 30 mg/dL    Creatinine 0.69 0.66 - 1.25 mg/dL    Calcium 9.2 8.5 - 10.1 mg/dL    Glucose 147 (H) 70 - 99 mg/dL    Alkaline Phosphatase 79 40 - 150 U/L    AST 68 (H) 0 - 45 U/L    ALT 52 0 - 70 U/L    Protein Total 8.9 (H) 6.8 - 8.8 g/dL    Albumin 4.5 3.4 - 5.0 g/dL    Bilirubin Total 0.4 0.2 - 1.3 mg/dL    GFR Estimate >90 >60 mL/min/1.73m2   Magnesium    Collection Time: 10/11/22  9:21 PM   Result Value Ref Range    Magnesium 2.4 (H) 1.6 - 2.3 mg/dL   CBC with platelets and differential    Collection Time: 10/11/22  9:21 PM   Result Value Ref Range    WBC Count 7.3 4.0 - 11.0 10e3/uL    RBC Count 4.42 4.40 - 5.90 10e6/uL    Hemoglobin 14.6 13.3 - 17.7 g/dL    Hematocrit 41.5 40.0 - 53.0 %    MCV 94 78 - 100 fL    MCH 33.0 26.5 - 33.0 pg    MCHC 35.2 31.5 - 36.5 g/dL    RDW 12.5 10.0 - 15.0 %    Platelet Count 278 150 - 450 10e3/uL    % Neutrophils 65 %    % Lymphocytes 25 %    % Monocytes 9 %    % Eosinophils 0 %    % Basophils 1 %    % Immature Granulocytes 0 %    NRBCs per 100 WBC 0 <1 /100    Absolute Neutrophils 4.6 1.6 - 8.3 10e3/uL    Absolute Lymphocytes 1.8 0.8 - 5.3 10e3/uL    Absolute Monocytes 0.7 0.0 - 1.3 10e3/uL    Absolute Eosinophils 0.0 0.0 - 0.7 10e3/uL    Absolute Basophils 0.1 0.0 - 0.2 10e3/uL    Absolute Immature Granulocytes 0.0 <=0.4 10e3/uL    Absolute  "NRBCs 0.0 10e3/uL   Drug abuse screen 1 urine (ED)    Collection Time: 10/11/22  9:28 PM   Result Value Ref Range    Amphetamines Urine Screen Positive (A) Screen Negative    Barbiturates Urine Screen Negative Screen Negative    Benzodiazepines Urine Screen Positive (A) Screen Negative    Cannabinoids Urine Screen Negative Screen Negative    Cocaine Urine Screen Negative Screen Negative    Opiates Urine Screen Negative Screen Negative   Potassium    Collection Time: 10/11/22 10:49 PM   Result Value Ref Range    Potassium 4.0 3.4 - 5.3 mmol/L   AST    Collection Time: 10/11/22 10:49 PM   Result Value Ref Range    AST     Lipid Profile    Collection Time: 10/11/22 10:49 PM   Result Value Ref Range    Cholesterol 144 <200 mg/dL    Triglycerides 170 (H) <150 mg/dL    Direct Measure HDL 57 >=40 mg/dL    LDL Cholesterol Calculated 53 <=100 mg/dL    Non HDL Cholesterol 87 <130 mg/dL   TSH with free T4 reflex    Collection Time: 10/11/22 10:49 PM   Result Value Ref Range    TSH 0.76 0.40 - 4.00 mU/L   GGT    Collection Time: 10/11/22 10:49 PM   Result Value Ref Range    GGT 89 (H) 0 - 75 U/L         BP (!) 159/108   Pulse 86   Temp 97.3  F (36.3  C) (Temporal)   Resp 16   Ht 1.905 m (6' 3\")   Wt 99.8 kg (220 lb)   SpO2 100%   BMI 27.50 kg/m    Weight is 220 lbs 0 oz  Body mass index is 27.5 kg/m .    Physical Exam:     ROS: 10 point ROS neg other than the symptoms noted above in the HPI.            Past Medical History:   PAST MEDICAL HISTORY:   Past Medical History:   Diagnosis Date     Anxiety      Depressive disorder      Hypertension      Pancreatitis      Pseudocyst of pancreas      Substance abuse (H)        PAST SURGICAL HISTORY:   Past Surgical History:   Procedure Laterality Date     ABDOMEN SURGERY       ENDOSCOPIC RETROGRADE CHOLANGIOPANCREATOGRAM  4/9/2014    Procedure: ENDOSCOPIC RETROGRADE CHOLANGIOPANCREATOGRAM;   Endoscopic Ultrasound with Fine needle aspiration, EsophagoGastroDuodenoscopy with stent " placement into pseudeocyst, balloon dilation.  ;  Surgeon: Jagjit Dow MD;  Location: [facility] OR     ESOPHAGOSCOPY, GASTROSCOPY, DUODENOSCOPY (EGD), COMBINED  4/9/2014    Procedure: COMBINED ENDOSCOPIC ULTRASOUND, ESOPHAGOSCOPY, GASTROSCOPY, DUODENOSCOPY (EGD), FINE NEEDLE ASPIRATE/BIOPSY;;  Surgeon: Jagjit Dow MD;  Location: [facility] OR     ESOPHAGOSCOPY, GASTROSCOPY, DUODENOSCOPY (EGD), COMBINED  6/24/2014    Procedure: COMBINED ESOPHAGOSCOPY, GASTROSCOPY, DUODENOSCOPY (EGD);  Surgeon: Jagjit Dow MD;  Location: [facility] OR       -    -           MENTAL STATUS EXAM:      Constitutional: General appearance of patient:  Appearance:  awake, alert, appeared as age stated, adequate groomed and slightly unkempt  Attitude:  cooperative  Eye Contact:  good  Mood:  scared  Affect:  congruent   Speech:  clear, coherent normal rate   Psychomotor Behavior:  no evidence of tardive dyskinesia, dystonia, or tics  Thought Process:  logical, linear and goal oriented  Associations:  no loose associations  Thought Content:  no evidence of psychotic thought and active suicidal ideation present  Denied any active suicidal /homicidation ideation plan intent   Insight:  fair  Judgment:  fair  Oriented to:  time, person, and place  Attention Span and Concentration:  intact  Recent and Remote Memory:  intact  Language:  english with appropriate syntax and vocabulary  Fund of Knowledge: appropriate  Muscle Strength and Tone: normal  Gait and Station: Normal     There are no abnormal or psychotic thoughts, no preoccupations, no overvalued ideas, no rumination, no obsessions, no compulsions, no somatic concerns, no hypochrondriasis, no ideas of reference, and no delusions.  Patient denies homicidal thoughts.   Patient denies suicidal thoughts.  Patient appears to have good judgment and good insight.     Musculoskeletal: Patient shows no abnormalities of motor activity: there is no tremor, no tic, and no dystonia.  There is no  apparent muscle atrophy, strength and tone appear normal, and there are no abnormal movements.  Patient has normal gait and stance.    DISCUSSION:         Assessment:       Patient has a biological predisposition with family history positive for mental illness chemical dependency  Psychologically patient is experiencing abusing alcohol nicotine having anxiety history of PTSD  Patient has these particular stressors work new house recent overdose  Patient has chronic illness exacerbation leading to hospitalization progression as described.     Patient has been unable to stop using drugs in the community due to both physical and psychological symptoms.  Continued use will put the patient at risk for medical and/or psychiatric complications.      Inpatient psychiatric hospitalization is warranted at this time for safety, stabilization, and possible adjustment in medications.       Diagnoses:    Alcohol use disorder severe  Alcohol withdrawal severe  History opiate use disorder with recent overdose on methadone  Nicotine dependence  History of PTSD  History of generalized anxiety disorder            Plan:   Problem list  1#alcohol use disorder severe alcohol withdrawal severe     - MSSA protocol using Valium for management of alcohol withdrawal    - Continue thiamine, folate, and multivitamin daily    MSSA    Eating Disturbances: 1  Tremor: 2  Sleep Disturbance: slept through the night or not applicable  Clouding of Sensorium: no evidence  Hallucinations: 0 - none  Quality of Contact: 0 - awareness of examiner and people around him/her  Agitation: 2  Paroxysmal Sweats: 1 - barely perceptible sweating  Temperature: 99.5 or below  Pulse: 2 - 80 to 89  Total MSSA Score: 9  Patient is a 20 mg of diazepam since his admission  2#patient is elevated blood pressure is 159/108 will call medicine ASAP  Patient supposed to metoprolol but he stopped taking it  3#patient has elevated AST 68 most likely from alcoholism normal  suspected patient has elevated GGT 89 from alcoholism normal suspected  4#patient's urine drug screen is positive for amphetamines and benzodiazepines we will have symptomatic detox from amphetamines and benzodiazepines was probably due to his recent hospitalizations  5#patient has a anxiety does not want to take any medications for his anxiety he wants to be on Seroquel we will revisit this tomorrow    Current Facility-Administered Medications   Medication     acetaminophen (TYLENOL) tablet 650 mg     alum & mag hydroxide-simethicone (MAALOX) suspension 30 mL     atenolol (TENORMIN) tablet 50 mg     diazepam (VALIUM) tablet 5-20 mg     folic acid (FOLVITE) tablet 1 mg     gabapentin (NEURONTIN) capsule 300 mg     hydrOXYzine (ATARAX) tablet 25 mg     loperamide (IMODIUM) capsule 2 mg     metoprolol succinate ER (TOPROL XL) 24 hr tablet 50 mg     multivitamin w/minerals (THERA-VIT-M) tablet 1 tablet     nicotine (NICODERM CQ) 21 MG/24HR 24 hr patch 1 patch     nicotine (NICORETTE) gum 2 mg     nicotine Patch in Place     ondansetron (ZOFRAN ODT) ODT tab 4 mg     QUEtiapine (SEROquel) tablet 25 mg     senna-docusate (SENOKOT-S/PERICOLACE) 8.6-50 MG per tablet 1 tablet     thiamine (B-1) tablet 100 mg     - Consider anti-craving medications prior to discharge. Pt willing to review additional information about both naltrexone and Antabuse.    Alcohol withdrawal nausea prn Zofran as needed for nausea     hydroxyzine 25 mg q4h prn for acute anxiety  Trazodone 50 mg at bedtime prn for sleep disturbances       Patient has been unable to stop using drugs in the community due to both physical and psychological symptoms.  Continued use will put the patient at risk for medical and/or psychiatric complications.    I HAVE REVIEWED LABS WITH PT AND TALKED ABOUT RESULTS WITH PT  I HAVE REVIEWED AND SUMMARIZED OLD RECORDS including his medication reconcilation of his home medications  and PDMP   I HAVE SPOKEN WITH RN ABOUT  MEDICATIONS AND DETOX SCORES  I HAVE SPOKEN WITH CM ABOUT PTS TREATMENT OPTIONS     Discussed in detail about patient's smoking patient was advised to quit patient was told about the impact of smoking.  Patient's willingness to quit was assessed.  I provided methods and skills for cessation including medication management nicotine gum patch.  Patient did not set a quit date.  Patient is interested in quitting .we discussed pharmacotherapy options .patient agreed to take nicotine gum patch lozenge.  We discussed behavioral change techniques when craving nicotine including deep breathing drinking glass of water, taking a walk.            Laboratory/Imaging:    Liver Function Studies -   Recent Labs   Lab Test 10/11/22  2121   PROTTOTAL 8.9*   ALBUMIN 4.5   BILITOTAL 0.4   ALKPHOS 79   AST 68*   ALT 52      Last Comprehensive Metabolic Panel:  Sodium   Date Value Ref Range Status   10/11/2022 141 133 - 144 mmol/L Final   06/10/2021 134 133 - 144 mmol/L Final     Potassium   Date Value Ref Range Status   10/11/2022 4.0 3.4 - 5.3 mmol/L Final     Comment:     Specimen slightly hemolyzed, potassium may be falsely elevated.   06/10/2021 4.2 3.4 - 5.3 mmol/L Final     Chloride   Date Value Ref Range Status   10/11/2022 104 94 - 109 mmol/L Final   06/10/2021 101 94 - 109 mmol/L Final     Carbon Dioxide   Date Value Ref Range Status   06/10/2021 27 20 - 32 mmol/L Final     Carbon Dioxide (CO2)   Date Value Ref Range Status   10/11/2022 29 20 - 32 mmol/L Final     Anion Gap   Date Value Ref Range Status   10/11/2022 8 3 - 14 mmol/L Final   06/10/2021 6 3 - 14 mmol/L Final     Glucose   Date Value Ref Range Status   10/11/2022 147 (H) 70 - 99 mg/dL Final   06/10/2021 108 (H) 70 - 99 mg/dL Final     Urea Nitrogen   Date Value Ref Range Status   10/11/2022 8 7 - 30 mg/dL Final   06/10/2021 10 7 - 30 mg/dL Final     Creatinine   Date Value Ref Range Status   10/11/2022 0.69 0.66 - 1.25 mg/dL Final   06/10/2021 0.81 0.66 - 1.25  mg/dL Final     GFR Estimate   Date Value Ref Range Status   10/11/2022 >90 >60 mL/min/1.73m2 Final     Comment:     Effective December 21, 2021 eGFRcr in adults is calculated using the 2021 CKD-EPI creatinine equation which includes age and gender (Bryan tomas al., NEJ, DOI: 10.1056/BBDCcm9047575)   06/10/2021 >90 >60 mL/min/[1.73_m2] Final     Comment:     Non  GFR Calc  Starting 12/18/2018, serum creatinine based estimated GFR (eGFR) will be   calculated using the Chronic Kidney Disease Epidemiology Collaboration   (CKD-EPI) equation.       Calcium   Date Value Ref Range Status   10/11/2022 9.2 8.5 - 10.1 mg/dL Final   06/10/2021 9.4 8.5 - 10.1 mg/dL Final     Bilirubin Total   Date Value Ref Range Status   10/11/2022 0.4 0.2 - 1.3 mg/dL Final   06/10/2021 0.9 0.2 - 1.3 mg/dL Final     Alkaline Phosphatase   Date Value Ref Range Status   10/11/2022 79 40 - 150 U/L Final   06/10/2021 69 40 - 150 U/L Final     ALT   Date Value Ref Range Status   10/11/2022 52 0 - 70 U/L Final   06/10/2021 95 (H) 0 - 70 U/L Final     AST   Date Value Ref Range Status   10/11/2022   Final     Comment:     Specimen is hemolyzed which can falsely elevate AST. Analysis of a non-hemolyzed specimen may result in a lower value.   06/10/2021 102 (H) 0 - 45 U/L Final                   Medical treatment/interventions:  Medical concerns: As above    - Consults: IM consult placed. Appreciate assistance.     Legal Status: Voluntary     Safety Assessment:   Checks: Status 15  Pt has not required locked seclusion or restraints in the past 24 hours to maintain safety, please refer to RN documentation for further details.    The risks, benefits, alternatives and side effects have been discussed and are understood by the patient.       Patient will be treated in therapeutic milieu with appropriate individual and group therapies as described.  Disposition: Pending clinical stabilization. Pt does  appear interested in COMPLETE DETOX AND  "DO TRT  Length of stay 3-5 days        \"Much or all of the text in this note was generated through the use of Dragon Dictate voice to text software. Errors in spelling or words which appear to be out of contact are unintentional, may be present due having escaped editing\"     "

## 2022-10-12 NOTE — PLAN OF CARE
10/12/22 0305   Patient Belongings   Did you bring any home meds/supplements to the hospital?  No   Patient Belongings sent to security per site process   Patient Belongings Put in Hospital Secure Location (Security or Locker, etc.) cash/credit card;cell phone/electronics;clothing;money (see comment);wallet;watch   Belongings Search Yes   Clothing Search Yes   Second Staff Kenneth ([employer] staff)     Med room bin: 6 bank cards, cash $100.00, 1 watch, 1 phone, 1 earpod, 1 card patel/set of keys    Storage room bin: 2 lighters, 1 belt, 1 tobaco can, 1 pack of cigarette, 5 tops, 2 pants.     A               Admission:  I am responsible for any personal items that are not sent to the safe or pharmacy.  [facility] is not responsible for loss, theft or damage of any property in my possession.    Signature:  _________________________________ Date: _______  Time: _____                                              Staff Signature:  ____________________________ Date: ________  Time: _____      2nd Staff person, if patient is unable/unwilling to sign:    Signature: ________________________________ Date: ________  Time: _____     Discharge:  [facility] has returned all of my personal belongings:    Signature: _________________________________ Date: ________  Time: _____                                          Staff Signature:  ____________________________ Date: ________  Time: _____

## 2022-10-12 NOTE — PLAN OF CARE
"SBAR      S = Situation:   Frank Arguello is a 37 year old year old male voluntarily admitted to detox from alcohol.      B  = Background:   Patient reported drinking up to a liter of vodka daily for the past 9 months; last drink was prior to going to the ED. Breathalyzer was 0.325. The patient denied any other substance use. Utox was positive for benzodiazepines and amphetamines. Patient stated that he was given benzos when he went to the ED at [facility] and endorsed occasional Adderall use. Patient has a history of alcohol withdrawal seizures and DTs. He reported his last seizure was 4 months ago.     Patient reported that he was sober for 2 years before relapsing approximately 9 months ago. Patient reported that he became stressed after he attempted to help his friend who lost his wife via suicide.    Patient also reported falling and sustaining a facial laceration.  He was seen at [facility]; CT was unremarkable.      Patient denied any acute medical or mental health issues. Patient is allergic to trazodone.    A  =  Assessment:   Patient was cooperative with the admission process. Safety search was completed; no contraband found.    Patient denied anxiety and depression. Denied SI/SIB.    Upon admission, MSSA=6. Patient was given 10mg of valium before being transferred to the unit (administered at 0102). At around 4am MSSA was 10; 10mg of valium was administered.    /87   Pulse 93   Temp 97.5  F (36.4  C) (Temporal)   Resp 16   Ht 1.905 m (6' 3\")   Wt 99.8 kg (220 lb)   SpO2 98%   BMI 27.50 kg/m      R =   Request or Recommendation:   MSSA protocol with valium was initiated. Internal medicine and attending psychiatrist to assess. CTC to be assigned. Precautions: withdrawal and seizure. PTA medications ordered: Seroquel 25mg HS PRN and metoprolol 50mg daily. Gabapentin 300mg TID was not ordered due to patient's usage (\"3x weekly\").  "

## 2022-10-12 NOTE — CONSULTS
[facility]  Consult Note - Hospitalist Service  Date of Admission:  10/11/2022  Consult Requested by: Dr. Hackett  Reason for Consult: H&P, co-mangement of detox    Assessment & Plan   Frank Arguello is a 37 year old male admitted on 10/11/2022. He has a PMHx of severe alcohol use disorder, alcohol detox with seizures/DTs, adjustment disorder with anxious mood, depression, HTN, herpes simplex infection of the penis, polysubstance abuse (benzos, narcotics, Adderall), overdose on opiates,  chronic pancreatitis, pancreatic pseudocyst, steatosis of the liver, tobacco dependence, and GERD.    Medicine will sign off as there are no acute issues. Please notify us if BP remains elevated despite treating withdrawal.      # Acute Alcohol Withdrawal  # Alcohol Use Disorder, severe  # Polysubstance abuse  Last drink on 10/11 PTA.   Patient states that he relapsed on alcohol soon after his last detox admission.  He drinks up to a liter of vodka per day with a couple beers.  He has a history or alcohol withdrawal including tremulousness, seizures, and DTs.  Patient states his last seizure was ~ 4 months ago. His last drink was around 1 hour of arriving on 10/11.   ABT of 0.325. Utox positive for benzos and amphetamines, did receive some Valium in the ED and uses Adderall occasionally..Alk phos WNL.Total bili WNL. AST: ALT of 68:52, in 2:1 pattern with alcohol use.  - Management per Psychiatry  - No need for BMP/hepatic panel unless clinically indicated   - Agree with [facility] protocol  - Agree with MVI, folate, thiamine     # Fall  # Facial trauma with laceration  Patient does state that he did fall 4 days ago while doing some yard work while drinking.  He struck his face on a cinderblock wall and sustained a laceration to the lip.  CT imaging at [facility] was negative for intracranial hemorrhage and cervical spine fracture. Received a laceration repair with 4 sutures on 10/7. Sutures  "no longer present as absorbable. Appears to be healing, but left side has some more erythema around it.  - Cleanse 2x daily with soap and water  - Apply bacterin BID   - Follow-up with PCP at discharge, consult placed as pt has no PCP.    # HTN  # Elevated BP in setting of withdrawl  Mildly elvated in SBP in 180s this AM. PTA meds include metoprolol. Received 1x dose of clonidine 0.1 for elevated BPs with decrease ~130/90s from 180s/70s.   - Continue metoprolol 50 mg daily, hold parameters placed  - Always recheck BP if high or low and correlate with clinical situation  - Treat pain, anxiety, and any withdrawal s/s if present  - Trend  - Notify provider if SBP >170 or DBP >110 after careful reassessment, treatment of pain/anxiety/withdrawal/home PTA med adminstration    # Chronic pancreatitis, alcohol induced  # Pancreatic cyst  No acute abdominal pain, N/V.   -Trend    # Anxiety  # Depression  - Per psychiatry    # Herpes simplex of penis: Flare around two weeks ago resolving. Does not want any anti-virals. Place PCP consult to follow at discharge.     # GERD: Continue 20 mg ompramazole PTA  # Tobacco use disorder: Nicotine patch     The patient's care was discussed with the Bedside Nurse, Patient and Primary team.    FAY Ramos CNP  [facility]  Securely message with the Vocera Web Console (learn more here)  Text page via [facility] Paging/Directory       Hospitalist Service    Clinically Significant Risk Factors Present on Admission                    # Overweight: Estimated body mass index is 27.5 kg/m  as calculated from the following:    Height as of this encounter: 1.905 m (6' 3\").    Weight as of this encounter: 99.8 kg (220 lb).        ______________________________________________________________________    Chief Complaint   Acute alcohol withdrawal  Alcohol use disorder      History is obtained from the patient and via chart review    History of Present " Illness   Frank Arguello is a 37 year old male who presents for alcohol withdrawl.  Patient states that he relapsed on alcohol soon after his last detox admission.  He drinks up to a liter of vodka per day with a couple beers.  He has a history or alcohol withdrawal including tremulousness, seizures, and DTs.  Patient states his last seizure was in 2016 upon interview. His last drink was around 1 hour of arriving on 10/11.     Patient does state that he did fall 4 days ago while doing some yard work while drinking.  He struck his face on a cinderblock wall and sustained a laceration to the lip.  CT imaging at [facility] was negative for intracranial hemorrhage and cervical spine fracture.     Pt denies any depression or suicidal ideation, or any other acute medical concerns. States he had a herpes flare ~2 weeks ago that is resolving. No dysuria or itching noted. BP at baseline with PO meds ~130-140s. Denies Denies any headaches, fevers, dysphagia, GERD s/s, SOB, chest pain, N/V, abdominal pain, dysuria, back pain or leg swelling.       Review of Systems   The 10 point Review of Systems is negative other than noted in the HPI or here.       Past Medical History    I have reviewed this patient's medical history and updated it with pertinent information if needed.   Past Medical History:   Diagnosis Date     Anxiety      Depressive disorder      Hypertension      Pancreatitis      Pseudocyst of pancreas      Substance abuse (H)        Past Surgical History   I have reviewed this patient's surgical history and updated it with pertinent information if needed.  Past Surgical History:   Procedure Laterality Date     ABDOMEN SURGERY       ENDOSCOPIC RETROGRADE CHOLANGIOPANCREATOGRAM  4/9/2014    Procedure: ENDOSCOPIC RETROGRADE CHOLANGIOPANCREATOGRAM;   Endoscopic Ultrasound with Fine needle aspiration, EsophagoGastroDuodenoscopy with stent placement into pseudeocyst, balloon dilation.  ;  Surgeon: Jagjit Dow,  MD;  Location: [facility] OR     ESOPHAGOSCOPY, GASTROSCOPY, DUODENOSCOPY (EGD), COMBINED  2014    Procedure: COMBINED ENDOSCOPIC ULTRASOUND, ESOPHAGOSCOPY, GASTROSCOPY, DUODENOSCOPY (EGD), FINE NEEDLE ASPIRATE/BIOPSY;;  Surgeon: Jagjit Dow MD;  Location: [facility] OR     ESOPHAGOSCOPY, GASTROSCOPY, DUODENOSCOPY (EGD), COMBINED  2014    Procedure: COMBINED ESOPHAGOSCOPY, GASTROSCOPY, DUODENOSCOPY (EGD);  Surgeon: Jagjit Dow MD;  Location: [facility] OR       Social History   I have reviewed this patient's social history and updated it with pertinent information if needed.  Social History     Tobacco Use     Smoking status: Some Days     Packs/day: 0.50     Types: Cigarettes   Substance Use Topics     Alcohol use: Yes     Comment: 1 pint and 6 beers per day     Drug use: Not Currently       Family History   I have reviewed this patient's family history and updated it with pertinent information if needed.  Family History   Problem Relation Age of Onset     Alcohol/Drug Paternal Grandfather      Alcohol/Drug Father      Substance Abuse Father      Hypertension Mother      Other - See Comments Brother          of heroin overdose     Substance Abuse Brother      Hyperlipidemia Maternal Grandmother      Diabetes No family hx of      Cancer No family hx of      Unknown/Adopted No family hx of      Depression No family hx of      Anxiety Disorder No family hx of      Schizophrenia No family hx of      Bipolar Disorder No family hx of      Suicide No family hx of      Dementia No family hx of      Keon Disease No family hx of      Parkinsonism No family hx of      Autism Spectrum Disorder No family hx of      Intellectual Disability (Mental Retardation) No family hx of      Mental Illness No family hx of      Suicidality Brother        Medications   I have reviewed this patient's current medications  Medications Prior to Admission   Medication Sig Dispense Refill Last Dose     gabapentin (NEURONTIN) 300 MG  capsule TAKE 3 CAPSULES BY MOUTH 3 TIMES DAILY 126 capsule 0 Unknown     metoprolol succinate ER (TOPROL-XL) 50 MG 24 hr tablet Take 1 tablet (50 mg) by mouth daily 90 tablet 3 Unknown     multivitamin, therapeutic with minerals (THERA-VIT-M) TABS Take 1 tablet by mouth daily 30 each 0 10/11/2022     QUEtiapine (SEROQUEL) 25 MG tablet Take 1 tablet (25 mg) by mouth at bedtime as needed, may repeat once (for sleep as needed) 30 tablet 3 Past Week     valACYclovir (VALTREX) 1000 mg tablet TAKE 1 TABLET BY MOUTH TWICE A DAY 14 tablet 4 Past Month       Allergies   Allergies   Allergen Reactions     Trazodone Other (See Comments)     erection       Physical Exam   Vital Signs: Temp: 97.8  F (36.6  C) Temp src: Temporal BP: (!) 184/74 Pulse: 81   Resp: 16 SpO2: 97 % O2 Device: None (Room air)    Weight: 220 lbs 0 oz    Constitutional: awake, alert, cooperative, no apparent distress, and appears stated age  Respiratory: No increased work of breathing, good air exchange, clear to auscultation bilaterally, no crackles or wheezing  Cardiovascular: Normal apical impulse, regular rate and rhythm, normal S1 and S2, no S3 or S4, and no murmur noted  GI: No scars, normal bowel sounds, soft, non-distended, non-tender, no masses palpated  : Deferred exam  Skin: no bruising or bleeding, normal skin color, texture, turgor and no redness, warmth, or swelling  Musculoskeletal: There is no redness, warmth, or swelling of the joints.  Full range of motion noted.  Motor strength is 5 out of 5 all extremities bilaterally.  Tone is normal.  Neuropsychiatric: General: normal, calm and normal eye contact  Level of consciousness: alert / normal  Affect: normal  Orientation: oriented to self, place, time and situation  Memory and insight: normal, memory for past and recent events intact and thought process normal    Data   Results for orders placed or performed during the hospital encounter of 10/11/22 (from the past 24 hour(s))   Alcohol  breath test POCT   Result Value Ref Range    Alcohol Breath Test 0.325 (A) 0.00 - 0.01   Asymptomatic COVID-19 Virus (Coronavirus) by PCR Nasopharyngeal    Specimen: Nasopharyngeal; Swab   Result Value Ref Range    SARS CoV2 PCR Negative Negative    Narrative    Testing was performed using the Xpert Xpress SARS-CoV-2 Assay on the   Cepheid Gene-Xpert Instrument Systems. Additional information about   this Emergency Use Authorization (EUA) assay can be found via the Lab   Guide. This test should be ordered for the detection of SARS-CoV-2 in   individuals who meet SARS-CoV-2 clinical and/or epidemiological   criteria. Test performance is unknown in asymptomatic patients. This   test is for in vitro diagnostic use under the FDA EUA for   laboratories certified under CLIA to perform high complexity testing.   This test has not been FDA cleared or approved. A negative result   does not rule out the presence of PCR inhibitors in the specimen or   target RNA in concentration below the limit of detection for the   assay. The possibility of a false negative should be considered if   the patient's recent exposure or clinical presentation suggests   COVID-19. This test was validated by the [facility]. This laboratory is certified under the Clinical Laboratory Improvement Amendments of 1988 (CLIA-88) as qualified to perform high complexity laboratory testing.     CBC with platelets differential    Narrative    The following orders were created for panel order CBC with platelets differential.  Procedure                               Abnormality         Status                     ---------                               -----------         ------                     CBC with platelets and d...[[id number]]                      Final result                 Please view results for these tests on the individual orders.   Comprehensive metabolic panel   Result Value Ref Range    Sodium 141 133 - 144  mmol/L    Potassium 4.6 3.4 - 5.3 mmol/L    Chloride 104 94 - 109 mmol/L    Carbon Dioxide (CO2) 29 20 - 32 mmol/L    Anion Gap 8 3 - 14 mmol/L    Urea Nitrogen 8 7 - 30 mg/dL    Creatinine 0.69 0.66 - 1.25 mg/dL    Calcium 9.2 8.5 - 10.1 mg/dL    Glucose 147 (H) 70 - 99 mg/dL    Alkaline Phosphatase 79 40 - 150 U/L    AST 68 (H) 0 - 45 U/L    ALT 52 0 - 70 U/L    Protein Total 8.9 (H) 6.8 - 8.8 g/dL    Albumin 4.5 3.4 - 5.0 g/dL    Bilirubin Total 0.4 0.2 - 1.3 mg/dL    GFR Estimate >90 >60 mL/min/1.73m2   Magnesium   Result Value Ref Range    Magnesium 2.4 (H) 1.6 - 2.3 mg/dL   CBC with platelets and differential   Result Value Ref Range    WBC Count 7.3 4.0 - 11.0 10e3/uL    RBC Count 4.42 4.40 - 5.90 10e6/uL    Hemoglobin 14.6 13.3 - 17.7 g/dL    Hematocrit 41.5 40.0 - 53.0 %    MCV 94 78 - 100 fL    MCH 33.0 26.5 - 33.0 pg    MCHC 35.2 31.5 - 36.5 g/dL    RDW 12.5 10.0 - 15.0 %    Platelet Count 278 150 - 450 10e3/uL    % Neutrophils 65 %    % Lymphocytes 25 %    % Monocytes 9 %    % Eosinophils 0 %    % Basophils 1 %    % Immature Granulocytes 0 %    NRBCs per 100 WBC 0 <1 /100    Absolute Neutrophils 4.6 1.6 - 8.3 10e3/uL    Absolute Lymphocytes 1.8 0.8 - 5.3 10e3/uL    Absolute Monocytes 0.7 0.0 - 1.3 10e3/uL    Absolute Eosinophils 0.0 0.0 - 0.7 10e3/uL    Absolute Basophils 0.1 0.0 - 0.2 10e3/uL    Absolute Immature Granulocytes 0.0 <=0.4 10e3/uL    Absolute NRBCs 0.0 10e3/uL   Urine Drugs of Abuse Screen    Narrative    The following orders were created for panel order Urine Drugs of Abuse Screen.  Procedure                               Abnormality         Status                     ---------                               -----------         ------                     Drug abuse screen 1 urin...[[id number]]  Abnormal            Final result                 Please view results for these tests on the individual orders.   Drug abuse screen 1 urine (ED)   Result Value Ref Range    Amphetamines Urine Screen  Positive (A) Screen Negative    Barbiturates Urine Screen Negative Screen Negative    Benzodiazepines Urine Screen Positive (A) Screen Negative    Cannabinoids Urine Screen Negative Screen Negative    Cocaine Urine Screen Negative Screen Negative    Opiates Urine Screen Negative Screen Negative   Potassium   Result Value Ref Range    Potassium 4.0 3.4 - 5.3 mmol/L   AST   Result Value Ref Range    AST     Lipid Profile   Result Value Ref Range    Cholesterol 144 <200 mg/dL    Triglycerides 170 (H) <150 mg/dL    Direct Measure HDL 57 >=40 mg/dL    LDL Cholesterol Calculated 53 <=100 mg/dL    Non HDL Cholesterol 87 <130 mg/dL    Narrative    Cholesterol  Desirable:  <200 mg/dL    Triglycerides  Normal:  Less than 150 mg/dL  Borderline High:  150-199 mg/dL  High:  200-499 mg/dL  Very High:  Greater than or equal to 500 mg/dL    Direct Measure HDL  Female:  Greater than or equal to 50 mg/dL   Male:  Greater than or equal to 40 mg/dL    LDL Cholesterol  Desirable:  <100mg/dL  Above Desirable:  100-129 mg/dL   Borderline High:  130-159 mg/dL   High:  160-189 mg/dL   Very High:  >= 190 mg/dL    Non HDL Cholesterol  Desirable:  130 mg/dL  Above Desirable:  130-159 mg/dL  Borderline High:  160-189 mg/dL  High:  190-219 mg/dL  Very High:  Greater than or equal to 220 mg/dL   TSH with free T4 reflex   Result Value Ref Range    TSH 0.76 0.40 - 4.00 mU/L   GGT   Result Value Ref Range    GGT 89 (H) 0 - 75 U/L

## 2022-10-13 NOTE — PLAN OF CARE
Problem: Alcohol Withdrawal  Goal: Optimal Neurologic Function  Outcome: Progressing   Goal Outcome Evaluation:  Pt is on withdrawal protocol. Scored 3 and 3 this shift, therefore needed no withdrawal medication. He will be out of ditox at 830 pm tonight, after which time pt will be discharged. He denied any psych sx or pain and he contracted for safety. He was medication compliant and he denied side effects. He was social with peers, [place] in the [place]. Will continue with plan of care.

## 2022-10-13 NOTE — PLAN OF CARE
Problem: Alcohol Withdrawal  Goal: Optimal Neurologic Function  Outcome: Progressing     Patient appeared to be sleeping throughout the night.    MSSA's were 6 and 4; patient was mildly sweaty. No concerns voiced.    We'll continue to monitor.

## 2022-10-13 NOTE — DISCHARGE SUMMARY
Frank Arguello MRN# [id number]   Age: 37 year old YOB: 1985     Date of Admission:  10/11/2022  Date of Discharge:  10/13/2022  Admitting Physician:  James Hackett MD  Discharge Physician:  Gautam Jimenez MD      DISCHARGE  DX    Alcohol use disorder severe    History opiate use disorder with recent overdose on methadone  Nicotine dependence  History of PTSD  History of generalized anxiety disorder            Event Leading to Hospitalization:     See Admission note by admitting provider for patient encounter. for additional details.          Hospital Course:   PATIENT was admitted to [facility] attending  under DR jimenez, please review the detailed admit note on 10/12/22   The patient was placed under status 15 (15 minute checks) to ensure patient safety.   MSSA protocol was initiated due to the patient's history of alcohol abuse and concern for withdrawal symptoms.  CBC, BMP and utox obtained.    All outpatient medications were continued    PATIENTdid participate in groups and was visible in the milieu.     The patient's symptoms of alochol withdrawal improved.     Patients energy motivation , sleep appetite improved.  Pt completed detox . It was un eventful.      Discussed with patient medications for craving.  Spoke with patient about triggers coping skills relapse prevention.    CONSULTS DONE DURING PATIENTS HOSPITALIZATION.  Patient was seen by medicine on date10/12/22  Assessment & Plan     Frank Arguello is a 37 year old male admitted on 10/11/2022. He has a PMHx of severe alcohol use disorder, alcohol detox with seizures/DTs, adjustment disorder with anxious mood, depression, HTN, herpes simplex infection of the penis, polysubstance abuse (benzos, narcotics, Adderall), overdose on opiates,  chronic pancreatitis, pancreatic pseudocyst, steatosis of the liver, tobacco dependence, and GERD.     Medicine will sign off as there are no acute issues. Please notify us if BP remains  elevated despite treating withdrawal.      # Acute Alcohol Withdrawal  # Alcohol Use Disorder, severe  # Polysubstance abuse  Last drink on 10/11 PTA.   Patient states that he relapsed on alcohol soon after his last detox admission.  He drinks up to a liter of vodka per day with a couple beers.  He has a history or alcohol withdrawal including tremulousness, seizures, and DTs.  Patient states his last seizure was ~ 4 months ago. His last drink was around 1 hour of arriving on 10/11.   ABT of 0.325. Utox positive for benzos and amphetamines, did receive some Valium in the ED and uses Adderall occasionally..Alk phos WNL.Total bili WNL. AST: ALT of 68:52, in 2:1 pattern with alcohol use.  - Management per Psychiatry  - No need for BMP/hepatic panel unless clinically indicated   - Agree with [facility] protocol  - Agree with MVI, folate, thiamine      # Fall  # Facial trauma with laceration  Patient does state that he did fall 4 days ago while doing some yard work while drinking.  He struck his face on a cinderblock wall and sustained a laceration to the lip.  CT imaging at [facility] was negative for intracranial hemorrhage and cervical spine fracture. Received a laceration repair with 4 sutures on 10/7. Sutures no longer present as absorbable. Appears to be healing, but left side has some more erythema around it.  - Cleanse 2x daily with soap and water  - Apply bacterin BID   - Follow-up with PCP at discharge, consult placed as pt has no PCP.     # HTN  # Elevated BP in setting of withdrawl  Mildly elvated in SBP in 180s this AM. PTA meds include metoprolol. Received 1x dose of clonidine 0.1 for elevated BPs with decrease ~130/90s from 180s/70s.   - Continue metoprolol 50 mg daily, hold parameters placed  - Always recheck BP if high or low and correlate with clinical situation  - Treat pain, anxiety, and any withdrawal s/s if present  - Trend  - Notify provider if SBP >170 or DBP >110 after careful reassessment,  "treatment of pain/anxiety/withdrawal/home PTA med adminstration     # Chronic pancreatitis, alcohol induced  # Pancreatic cyst  No acute abdominal pain, N/V.   -Trend     # Anxiety  # Depression  - Per psychiatry     # Herpes simplex of penis: Flare around two weeks ago resolving. Does not want any anti-virals. Place PCP consult to follow at discharge.      # GERD: Continue 20 mg ompramazole PTA  # Tobacco use disorder: Nicotine patch        The patient's care was discussed with the Bedside Nurse, Patient and Primary team.     This as per their medical consult                Pt was seen by cm  As per recommendations from cm  Met with pt to discuss discharge planning. Pt reports his plan is to return home, contniue working with his recovery supports including his AA home group, sponsor, girlfriend. Pt reports he relapsed about 90 days ago. Pt reports this was triggered by his friend's wife \"blowing her head off\" and pt trying to support his friend.      Records indicate pt overdosed 9/28 on methadone. Pt reports he was intoxicated and found a bottle of methadone in his house and drank it without thinking about the dosage. Pt reports he has previously struggled with heroin use and been on methadone maintenance but had tapered off about 6 months prior to the overdose.      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 Pt reports he lives alone in a home he just purchased but his girlfriend lives right next to him and his home group is just up the street.     Pt declines any referral to treatment, stating he knows what he needs to do to be sober. When writer shared concern that pt has been unable to get sober with these tools in the last 90 days, pt stated \"well I haven't really tried to get sober, to be honest\". Pt reports he has tried therapy in the past and didn't like it, found it \"monotonous\". Pt declines any resources at this time. "            Labs:reviewed with patient       Recent Results (from the past 48 hour(s))   Alcohol breath test POCT    Collection Time: 10/11/22  7:56 PM   Result Value Ref Range    Alcohol Breath Test 0.325 (A) 0.00 - 0.01   Asymptomatic COVID-19 Virus (Coronavirus) by PCR Nasopharyngeal    Collection Time: 10/11/22  9:21 PM    Specimen: Nasopharyngeal; Swab   Result Value Ref Range    SARS CoV2 PCR Negative Negative   Comprehensive metabolic panel    Collection Time: 10/11/22  9:21 PM   Result Value Ref Range    Sodium 141 133 - 144 mmol/L    Potassium 4.6 3.4 - 5.3 mmol/L    Chloride 104 94 - 109 mmol/L    Carbon Dioxide (CO2) 29 20 - 32 mmol/L    Anion Gap 8 3 - 14 mmol/L    Urea Nitrogen 8 7 - 30 mg/dL    Creatinine 0.69 0.66 - 1.25 mg/dL    Calcium 9.2 8.5 - 10.1 mg/dL    Glucose 147 (H) 70 - 99 mg/dL    Alkaline Phosphatase 79 40 - 150 U/L    AST 68 (H) 0 - 45 U/L    ALT 52 0 - 70 U/L    Protein Total 8.9 (H) 6.8 - 8.8 g/dL    Albumin 4.5 3.4 - 5.0 g/dL    Bilirubin Total 0.4 0.2 - 1.3 mg/dL    GFR Estimate >90 >60 mL/min/1.73m2   Magnesium    Collection Time: 10/11/22  9:21 PM   Result Value Ref Range    Magnesium 2.4 (H) 1.6 - 2.3 mg/dL   CBC with platelets and differential    Collection Time: 10/11/22  9:21 PM   Result Value Ref Range    WBC Count 7.3 4.0 - 11.0 10e3/uL    RBC Count 4.42 4.40 - 5.90 10e6/uL    Hemoglobin 14.6 13.3 - 17.7 g/dL    Hematocrit 41.5 40.0 - 53.0 %    MCV 94 78 - 100 fL    MCH 33.0 26.5 - 33.0 pg    MCHC 35.2 31.5 - 36.5 g/dL    RDW 12.5 10.0 - 15.0 %    Platelet Count 278 150 - 450 10e3/uL    % Neutrophils 65 %    % Lymphocytes 25 %    % Monocytes 9 %    % Eosinophils 0 %    % Basophils 1 %    % Immature Granulocytes 0 %    NRBCs per 100 WBC 0 <1 /100    Absolute Neutrophils 4.6 1.6 - 8.3 10e3/uL    Absolute Lymphocytes 1.8 0.8 - 5.3 10e3/uL    Absolute Monocytes 0.7 0.0 - 1.3 10e3/uL    Absolute Eosinophils 0.0 0.0 - 0.7 10e3/uL    Absolute Basophils 0.1 0.0 - 0.2 10e3/uL     Absolute Immature Granulocytes 0.0 <=0.4 10e3/uL    Absolute NRBCs 0.0 10e3/uL   Drug abuse screen 1 urine (ED)    Collection Time: 10/11/22  9:28 PM   Result Value Ref Range    Amphetamines Urine Screen Positive (A) Screen Negative    Barbiturates Urine Screen Negative Screen Negative    Benzodiazepines Urine Screen Positive (A) Screen Negative    Cannabinoids Urine Screen Negative Screen Negative    Cocaine Urine Screen Negative Screen Negative    Opiates Urine Screen Negative Screen Negative   Potassium    Collection Time: 10/11/22 10:49 PM   Result Value Ref Range    Potassium 4.0 3.4 - 5.3 mmol/L   AST    Collection Time: 10/11/22 10:49 PM   Result Value Ref Range    AST     Lipid Profile    Collection Time: 10/11/22 10:49 PM   Result Value Ref Range    Cholesterol 144 <200 mg/dL    Triglycerides 170 (H) <150 mg/dL    Direct Measure HDL 57 >=40 mg/dL    LDL Cholesterol Calculated 53 <=100 mg/dL    Non HDL Cholesterol 87 <130 mg/dL   TSH with free T4 reflex    Collection Time: 10/11/22 10:49 PM   Result Value Ref Range    TSH 0.76 0.40 - 4.00 mU/L   GGT    Collection Time: 10/11/22 10:49 PM   Result Value Ref Range    GGT 89 (H) 0 - 75 U/L         Recent Results (from the past 240 hour(s))   Alcohol breath test POCT    Collection Time: 10/11/22  7:56 PM   Result Value Ref Range    Alcohol Breath Test 0.325 (A) 0.00 - 0.01   Asymptomatic COVID-19 Virus (Coronavirus) by PCR Nasopharyngeal    Collection Time: 10/11/22  9:21 PM    Specimen: Nasopharyngeal; Swab   Result Value Ref Range    SARS CoV2 PCR Negative Negative   Comprehensive metabolic panel    Collection Time: 10/11/22  9:21 PM   Result Value Ref Range    Sodium 141 133 - 144 mmol/L    Potassium 4.6 3.4 - 5.3 mmol/L    Chloride 104 94 - 109 mmol/L    Carbon Dioxide (CO2) 29 20 - 32 mmol/L    Anion Gap 8 3 - 14 mmol/L    Urea Nitrogen 8 7 - 30 mg/dL    Creatinine 0.69 0.66 - 1.25 mg/dL    Calcium 9.2 8.5 - 10.1 mg/dL    Glucose 147 (H) 70 - 99 mg/dL     Alkaline Phosphatase 79 40 - 150 U/L    AST 68 (H) 0 - 45 U/L    ALT 52 0 - 70 U/L    Protein Total 8.9 (H) 6.8 - 8.8 g/dL    Albumin 4.5 3.4 - 5.0 g/dL    Bilirubin Total 0.4 0.2 - 1.3 mg/dL    GFR Estimate >90 >60 mL/min/1.73m2   Magnesium    Collection Time: 10/11/22  9:21 PM   Result Value Ref Range    Magnesium 2.4 (H) 1.6 - 2.3 mg/dL   CBC with platelets and differential    Collection Time: 10/11/22  9:21 PM   Result Value Ref Range    WBC Count 7.3 4.0 - 11.0 10e3/uL    RBC Count 4.42 4.40 - 5.90 10e6/uL    Hemoglobin 14.6 13.3 - 17.7 g/dL    Hematocrit 41.5 40.0 - 53.0 %    MCV 94 78 - 100 fL    MCH 33.0 26.5 - 33.0 pg    MCHC 35.2 31.5 - 36.5 g/dL    RDW 12.5 10.0 - 15.0 %    Platelet Count 278 150 - 450 10e3/uL    % Neutrophils 65 %    % Lymphocytes 25 %    % Monocytes 9 %    % Eosinophils 0 %    % Basophils 1 %    % Immature Granulocytes 0 %    NRBCs per 100 WBC 0 <1 /100    Absolute Neutrophils 4.6 1.6 - 8.3 10e3/uL    Absolute Lymphocytes 1.8 0.8 - 5.3 10e3/uL    Absolute Monocytes 0.7 0.0 - 1.3 10e3/uL    Absolute Eosinophils 0.0 0.0 - 0.7 10e3/uL    Absolute Basophils 0.1 0.0 - 0.2 10e3/uL    Absolute Immature Granulocytes 0.0 <=0.4 10e3/uL    Absolute NRBCs 0.0 10e3/uL   Drug abuse screen 1 urine (ED)    Collection Time: 10/11/22  9:28 PM   Result Value Ref Range    Amphetamines Urine Screen Positive (A) Screen Negative    Barbiturates Urine Screen Negative Screen Negative    Benzodiazepines Urine Screen Positive (A) Screen Negative    Cannabinoids Urine Screen Negative Screen Negative    Cocaine Urine Screen Negative Screen Negative    Opiates Urine Screen Negative Screen Negative   Potassium    Collection Time: 10/11/22 10:49 PM   Result Value Ref Range    Potassium 4.0 3.4 - 5.3 mmol/L   AST    Collection Time: 10/11/22 10:49 PM   Result Value Ref Range    AST     Lipid Profile    Collection Time: 10/11/22 10:49 PM   Result Value Ref Range    Cholesterol 144 <200 mg/dL    Triglycerides 170 (H)  <150 mg/dL    Direct Measure HDL 57 >=40 mg/dL    LDL Cholesterol Calculated 53 <=100 mg/dL    Non HDL Cholesterol 87 <130 mg/dL   TSH with free T4 reflex    Collection Time: 10/11/22 10:49 PM   Result Value Ref Range    TSH 0.76 0.40 - 4.00 mU/L   GGT    Collection Time: 10/11/22 10:49 PM   Result Value Ref Range    GGT 89 (H) 0 - 75 U/L            Because this patient meets criteria for an Alcohol Use Disorder, I performed the following brief intervention on the date of this note:              1) Expressed concern that the patient is drinking at unhealthy levels known to increase their risk of alcohol related problems              2) Gave feedback linking alcohol use and health, including personalized feedback explaining how alcohol use can interact with their medical and/or psychiatric problems, and with prescribed medications.              3) Advised patient to abstain.    PT counseled on nicotine cessation and nicotine replacement provided    Discussed with patient many issues of addiction,triggers, relapse, and establishing a solid recovery program.    DISCHARGE MENTAL STATUS EXAMINATION:  The patient is alert, oriented x3.  Good fund of knowledge.  Good use of language.  Recent and remote memory, language, fund of knowledge are all adequate.  Euthymic mood congruent affect  Speech normal rate/rhythm linear tp no loose asso,The patient does not have any active suicidal or homicidal ideation.  Does not have any auditory or visual hallucination.  Fair insight/judgment At this time, the patient was stable to be discharged.        Pt was not determined to not be a danger to himself or others. At the current time of discharge, the patient does not meet criteria for involuntary hospitalization. On the day of discharge, the patient reports that they do not have suicidal or homicidal ideation and would never hurt themselves or others. Steps taken to minimize risk include: assessing patient s behavior and thought  process daily during hospital stay, discharging patient with adequate plan for follow up for mental and physical health and discussing safety plan of returning to the hospital should the patient ever have thoughts of harming themselves or others. Therefore, based on all available evidence including the factors cited above, the patient does not appear to be at imminent risk for self-harm, and is appropriate for outpatient level of care.     Educated about side effects/risk vs benefits /alternative including non treatment.Pt consented to be on medication.     .Total time spent on discharge summary more than 35 min  More than  20 min  planning, coordination of care, medication reconciliation and performance of physical exam on day of discharge.Care was coordinated with unit RN and unit therapist         Review of your medicines      UNREVIEWED medicines. Ask your doctor about these medicines      Dose / Directions   gabapentin 300 MG capsule  Commonly known as: NEURONTIN  Used for: Chronic abdominal pain, Uncomplicated opioid dependence (H), Alcohol-induced acute pancreatitis without infection or necrosis, Anxiety, Primary insomnia      TAKE 3 CAPSULES BY MOUTH 3 TIMES DAILY  Quantity: 126 capsule  Refills: 0     metoprolol succinate ER 50 MG 24 hr tablet  Commonly known as: TOPROL XL  Used for: Benign essential hypertension      Dose: 50 mg  Take 1 tablet (50 mg) by mouth daily  Quantity: 90 tablet  Refills: 3     multivitamin w/minerals tablet  Used for: Alcohol dependence with intoxication with complication (H)      Dose: 1 tablet  Take 1 tablet by mouth daily  Quantity: 30 each  Refills: 0     QUEtiapine 25 MG tablet  Commonly known as: SEROquel  Used for: Primary insomnia, Uncomplicated opioid dependence (H), Alcohol-induced acute pancreatitis without infection or necrosis, Chronic abdominal pain, Anxiety      Dose: 25 mg  Take 1 tablet (25 mg) by mouth at bedtime as needed, may repeat once (for sleep as  "needed)  Quantity: 30 tablet  Refills: 3     valACYclovir 1000 mg tablet  Commonly known as: VALTREX  Used for: HSV (herpes simplex virus) infection      TAKE 1 TABLET BY MOUTH TWICE A DAY  Quantity: 14 tablet  Refills: 4             Disposition: home     Facts about COVID19 at www.cdc.gov/COVID19 and www.[place].gov/covid19     Keeping hands clean is one of the most important steps we can take to avoid getting sick and spreading germs to others.  Please wash your hands frequently and lather with soap for at least 20 seconds!     Medical Follow-Up:  pcp In 3 to 4 weeks       Treatment Follow-Up:aa  .        \"Much or all of the text in this note was generated through the use of Dragon Dictate voice to text software. Errors in spelling or words which appear to be out of contact are unintentional, may be present due having escaped editing\"     "

## 2022-10-14 NOTE — PLAN OF CARE
Problem: Alcohol Withdrawal  Goal: Optimal Neurologic Function  Outcome: Progressing  Patient discharged to home  Reports being picked up by significant other.  Patient escorted off the unit by psych associate, Gail PRESTON      All patient belongings from room, locked bin and security were sent with patient. This RN went over discharge instructions, teachings, patient's labs, and discharge  recommendations with patient. This RN went over patient's prescribed medications being sent home with patient from pharmacy. Patient verbalizes and demonstrates understanding of all teachings. Patient denies thoughts of harm towards self or others.  Pt denies any current medical issues at this time. Patient denies any further questions and is now discharged at this time 20:10.

## 2022-12-01 NOTE — ED TRIAGE NOTES
Triage Assessment     Row Name 12/01/22 1197       Triage Assessment (Adult)    Airway WDL WDL       Respiratory WDL    Respiratory WDL WDL       Skin Circulation/Temperature WDL    Skin Circulation/Temperature WDL WDL       Cardiac WDL    Cardiac WDL WDL       Peripheral/Neurovascular WDL    Peripheral Neurovascular WDL WDL       Cognitive/Neuro/Behavioral WDL    Cognitive/Neuro/Behavioral WDL X    Speech slurred       Agar Coma Scale    Best Eye Response 4-->(E4) spontaneous    Best Motor Response 6-->(M6) obeys commands    Best Verbal Response 5-->(V5) oriented    Hayden Coma Scale Score 15            pt BAL .40 pt has history of seizures.

## 2022-12-01 NOTE — LETTER
December 2, 2022      To Whom It May Concern:      Frank UMANG Arguello was seen in our Emergency Department today, 12/02/22.   Sincerely,        Eulogio Dixon, DO

## 2022-12-02 NOTE — DISCHARGE INSTRUCTIONS
Return if you want to go to detox. Return to the emergency department if there are any new symptoms or any cause for concern.

## 2022-12-02 NOTE — ED NOTES
Pt was upset that mother was here and staying here with pt. Mother (Etta Arguello) specified that he would like to get updates if there is any movement and would like this writer to relay to the RN or MD that this patient was just in [facility] [place] couple [facility] ago and was told that he was dying and was going to be put in dialysis. Mother would like this writer to relay if possible to put patient on a hold so that he won't be killing himself with his alcohol addiction. Pt kept on cursing and trying to leave saying that he doesn't need any discharge paper because he can just leave against medical advice. RN explained that he would not be able to do that because he is still intoxicated. RN notified. Mother's contact is [phone].

## 2022-12-02 NOTE — ED NOTES
Patient wants to leave, explained that he needs a sober ride; family member that is here refuses to provide a sober ride. Patient advised that he needs to be clinically sober.

## 2022-12-02 NOTE — TELEPHONE ENCOUNTER
S: [facility] ED MD Dixon calling at 9:32PM.,  37 year old/Male presenting for alcohol detox.     B: Pt presents for ETOH detox. Currently reports drinking 1/2 of L of hard liquor for the last few months.  Patient reports last use was right before he came in today.  He reports a hx of w/d seizure in the past.  No DTs.  He reports that he gets shakes when in withdrawal.  He has a hx of benzo and opiate use but nothing currently.      Pt denies SI/HI.  No recent illness.  Pt is still pretty intoxicated at this time.  Labs look good.      He is walking and talking.  Awake and alert.  There are no chronic medical illness.      Pt BEATRIS: Breathalyzer was 0.4 at 5:08pm.  Pt  denies hx of DT  Pt  endorses hx of seizures. Last seizure: N/A  Pt denies acute mental health or medical concerns.   Pt denies other drug use: Amphetamines Amount/frequency: N/A  He reports a hx of opiote and benzo use.  None currently.   Pt endorsing the following symptoms of withdrawal: None.  Pt is pretty intoxicated.   Does Pt have a detox care plan in [facility]? No.  Is the patient ambulating, eating and drinking in the ED? Yes.    Does Pt present with any of the following: assistive devices, insulin pump, J/G tube, catheter, CPAP, continuous IV, continuous O2, bariatric needs, ADA needs? No    A: Pt meets criteria for presentation to IP detox. Patient is voluntary  COVID: Negative  Utox: Ordered, not yet collected  HCG: N/A  CMP: WNL AST 58  CBC: WNL  Platelet Count 123  Pt did not receive replacement for potassium.  Not needed.  K is WNL.   MSSA Score: Pt is pretty intoxicated still.     R: Patient cleared and ready for behavioral bed placement: Yes     Presenting for admission to 3A/CD.    Per ED RN note, Pt's behavior has escalated; he is cursing and trying to leave.      10:44pm- ED RN reports that Pt is insistent on discharging.  Pt has asked RN to remove IV.

## 2022-12-02 NOTE — ED NOTES
"Patient complaining that we haven't done anything, haven't given him meds, even though he is insistent on leaving; was reminded that he was given fluids, nicotine gum and patch; and that he refused valium because he states, \"I want to leave on my own recognizance.\" He has been advised numerous times that he needs to get a sober ride or be clinically sober before the MD will let him leave.   "

## 2022-12-02 NOTE — ED PROVIDER NOTES
ED Provider Note  [facility]      History     Chief Complaint   Patient presents with     Alcohol Problem     Seeking detox     HPI  Frank Arguello is a 37 year old male who presents for alcohol detox.  Drinks half a liter of hard alcohol per day.  They have a history of withdrawal symptoms and have had a seizure from alcohol in the past.  Last drink was just prior to arrival.  No other drug use.  Previous opioid and benzodiazepine use no suicidal or homicidal ideation.  No recent illness.  No other symptoms noted.    Past Medical History  Past Medical History:   Diagnosis Date     Anxiety      Depressive disorder      Hypertension      Pancreatitis      Pseudocyst of pancreas      Substance abuse (H)      Past Surgical History:   Procedure Laterality Date     ABDOMEN SURGERY       ENDOSCOPIC RETROGRADE CHOLANGIOPANCREATOGRAM  4/9/2014    Procedure: ENDOSCOPIC RETROGRADE CHOLANGIOPANCREATOGRAM;   Endoscopic Ultrasound with Fine needle aspiration, EsophagoGastroDuodenoscopy with stent placement into pseudeocyst, balloon dilation.  ;  Surgeon: Jagjit Dow MD;  Location: [facility] OR     ESOPHAGOSCOPY, GASTROSCOPY, DUODENOSCOPY (EGD), COMBINED  4/9/2014    Procedure: COMBINED ENDOSCOPIC ULTRASOUND, ESOPHAGOSCOPY, GASTROSCOPY, DUODENOSCOPY (EGD), FINE NEEDLE ASPIRATE/BIOPSY;;  Surgeon: Jagjit Dow MD;  Location: [facility] OR     ESOPHAGOSCOPY, GASTROSCOPY, DUODENOSCOPY (EGD), COMBINED  6/24/2014    Procedure: COMBINED ESOPHAGOSCOPY, GASTROSCOPY, DUODENOSCOPY (EGD);  Surgeon: Jagjit Dow MD;  Location: [facility] OR     gabapentin (NEURONTIN) 300 MG capsule  hydrOXYzine (ATARAX) 25 MG tablet  metoprolol succinate ER (TOPROL XL) 50 MG 24 hr tablet  multivitamin w/minerals (THERA-VIT-M) tablet  naloxone (NARCAN) 4 MG/0.1ML nasal spray  neomycin-bacitracin-polymyxin (NEOSPORIN) 5-400-5000 ointment  nicotine (NICODERM CQ) 21 MG/24HR 24 hr patch  QUEtiapine (SEROQUEL) 25 MG tablet  thiamine  "(B-1) 100 MG tablet  nicotine (NICORETTE) 2 MG gum      Allergies   Allergen Reactions     Trazodone Other (See Comments)     erection     Family History  Family History   Problem Relation Age of Onset     Alcohol/Drug Paternal Grandfather      Alcohol/Drug Father      Substance Abuse Father      Hypertension Mother      Other - See Comments Brother          of heroin overdose     Substance Abuse Brother      Hyperlipidemia Maternal Grandmother      Diabetes No family hx of      Cancer No family hx of      Unknown/Adopted No family hx of      Depression No family hx of      Anxiety Disorder No family hx of      Schizophrenia No family hx of      Bipolar Disorder No family hx of      Suicide No family hx of      Dementia No family hx of      [place] Disease No family hx of      Parkinsonism No family hx of      Autism Spectrum Disorder No family hx of      Intellectual Disability (Mental Retardation) No family hx of      Mental Illness No family hx of      Suicidality Brother      Social History   Social History     Tobacco Use     Smoking status: Some Days     Packs/day: 0.50     Types: Cigarettes   Substance Use Topics     Alcohol use: Yes     Comment: 1 pint and 6 beers per day     Drug use: Not Currently      Past medical history, past surgical history, medications, allergies, family history, and social history were reviewed with the patient. No additional pertinent items.       Review of Systems  A complete review of systems was performed with pertinent positives and negatives noted in the HPI, and all other systems negative.    Physical Exam   BP: (!) 153/101  Pulse: (!) 143  Temp: 98.6  F (37  C)  Resp: 16  Height: 190.5 cm (6' 3\")  Weight: 89.1 kg (196 lb 8 oz)  SpO2: 97 %  Physical Exam  Vitals and nursing note reviewed.   Constitutional:       General: He is not in acute distress.     Appearance: He is not diaphoretic.   HENT:      Head: Atraumatic.      Mouth/Throat:      Mouth: Oropharynx is clear " and moist.   Eyes:      General: No scleral icterus.     Pupils: Pupils are equal, round, and reactive to light.   Cardiovascular:      Pulses: Intact distal pulses.      Heart sounds: Normal heart sounds.   Pulmonary:      Effort: No respiratory distress.      Breath sounds: Normal breath sounds.   Abdominal:      General: Bowel sounds are normal.      Palpations: Abdomen is soft.      Tenderness: There is no abdominal tenderness.   Musculoskeletal:         General: No tenderness or edema.   Skin:     General: Skin is warm.      Findings: No rash.   Psychiatric:         Thought Content: Thought content does not include homicidal or suicidal ideation.         ED Course      Procedures                     Results for orders placed or performed during the hospital encounter of 12/01/22   Comprehensive metabolic panel     Status: Abnormal   Result Value Ref Range    Sodium 142 133 - 144 mmol/L    Potassium 3.4 3.4 - 5.3 mmol/L    Chloride 105 94 - 109 mmol/L    Carbon Dioxide (CO2) 23 20 - 32 mmol/L    Anion Gap 14 3 - 14 mmol/L    Urea Nitrogen 7 7 - 30 mg/dL    Creatinine 0.64 (L) 0.66 - 1.25 mg/dL    Calcium 8.9 8.5 - 10.1 mg/dL    Glucose 134 (H) 70 - 99 mg/dL    Alkaline Phosphatase 61 40 - 150 U/L    AST 58 (H) 0 - 45 U/L    ALT 32 0 - 70 U/L    Protein Total 8.4 6.8 - 8.8 g/dL    Albumin 4.5 3.4 - 5.0 g/dL    Bilirubin Total 0.4 0.2 - 1.3 mg/dL    GFR Estimate >90 >60 mL/min/1.73m2   CBC with platelets and differential     Status: Abnormal   Result Value Ref Range    WBC Count 6.2 4.0 - 11.0 10e3/uL    RBC Count 4.56 4.40 - 5.90 10e6/uL    Hemoglobin 14.7 13.3 - 17.7 g/dL    Hematocrit 42.2 40.0 - 53.0 %    MCV 93 78 - 100 fL    MCH 32.2 26.5 - 33.0 pg    MCHC 34.8 31.5 - 36.5 g/dL    RDW 12.7 10.0 - 15.0 %    Platelet Count 123 (L) 150 - 450 10e3/uL    % Neutrophils 74 %    % Lymphocytes 20 %    % Monocytes 5 %    % Eosinophils 0 %    % Basophils 1 %    % Immature Granulocytes 0 %    NRBCs per 100 WBC 0 <1 /100     Absolute Neutrophils 4.6 1.6 - 8.3 10e3/uL    Absolute Lymphocytes 1.3 0.8 - 5.3 10e3/uL    Absolute Monocytes 0.3 0.0 - 1.3 10e3/uL    Absolute Eosinophils 0.0 0.0 - 0.7 10e3/uL    Absolute Basophils 0.1 0.0 - 0.2 10e3/uL    Absolute Immature Granulocytes 0.0 <=0.4 10e3/uL    Absolute NRBCs 0.0 10e3/uL   [place] Draw     Status: None    Narrative    The following orders were created for panel order [place] Draw.  Procedure                               Abnormality         Status                     ---------                               -----------         ------                     Extra Blue Top Tube[[id number]]                              Final result               Extra Red Top Tube[[id number]]                               Final result                 Please view results for these tests on the individual orders.   Extra Blue Top Tube     Status: None   Result Value Ref Range    Hold Specimen JIC    Extra Red Top Tube     Status: None   Result Value Ref Range    Hold Specimen JIC    Alcohol breath test POCT     Status: Abnormal   Result Value Ref Range    Alcohol Breath Test 0.40 (A) 0.00 - 0.01   CBC with platelets differential     Status: Abnormal    Narrative    The following orders were created for panel order CBC with platelets differential.  Procedure                               Abnormality         Status                     ---------                               -----------         ------                     CBC with platelets and d...[[id number]]  Abnormal            Final result                 Please view results for these tests on the individual orders.     Medications   diazepam (VALIUM) tablet 5-20 mg (has no administration in time range)   nicotine (NICODERM CQ) 21 MG/24HR 24 hr patch 1 patch (1 patch Transdermal Patch/Med Applied 12/1/22 2230)     And   nicotine Patch in Place ( Transdermal Patch in Place 12/1/22 2239)   0.9% sodium chloride BOLUS (1,000 mLs Intravenous New Bag 12/1/22 2237)      Followed by   sodium chloride 0.9% infusion (has no administration in time range)   0.9% sodium chloride BOLUS (0 mLs Intravenous Stopped 12/1/22 2130)   nicotine polacrilex (NICORETTE) gum 4 mg (4 mg Buccal Given 12/1/22 2042)        Assessments & Plan (with Medical Decision Making)   This is a 87-year-old male who presents for alcohol detox.  Last alcohol intake was prior to arrival.  Alcohol level is 0.4.  Exam demonstrates acute abnormalities.      Initial plan was to admit for detox.  While in the Emergency Department patient decided he no longer wanted to go through detox.  Patient was monitored on until clinically sober.  Patient is awake alert and able to ambulate and converse without difficulty.  Discussed with patient that his mother requested updates on his hospital course.  Patient states that he does not want her to be contacted.  Discussed that if patient wants to go through detox he can return at any time.  We will discharge with return precautions.    I have reviewed the nursing notes. I have reviewed the findings, diagnosis, plan and need for follow up with the patient.    New Prescriptions    No medications on file       Final diagnoses:   Alcohol dependence with uncomplicated intoxication (H)       --  Eulogio Dixon DO  [facility] EMERGENCY DEPARTMENT  12/1/2022     Eulogio Dixon,   12/02/22 0224

## 2023-01-01 ENCOUNTER — HOSPITAL ENCOUNTER (OUTPATIENT)
Dept: BEHAVIORAL HEALTH | Facility: CLINIC | Age: 38
Discharge: HOME | End: 2023-02-03
Attending: FAMILY MEDICINE
Payer: COMMERCIAL

## 2023-01-01 ENCOUNTER — HOSPITAL ENCOUNTER (OUTPATIENT)
Dept: BEHAVIORAL HEALTH | Facility: CLINIC | Age: 38
Discharge: HOME | End: 2023-01-21
Attending: FAMILY MEDICINE
Payer: COMMERCIAL

## 2023-01-01 ENCOUNTER — TELEPHONE (OUTPATIENT)
Dept: BEHAVIORAL HEALTH | Facility: CLINIC | Age: 38
End: 2023-01-01

## 2023-01-01 ENCOUNTER — HOSPITAL ENCOUNTER (OUTPATIENT)
Dept: BEHAVIORAL HEALTH | Facility: CLINIC | Age: 38
Discharge: HOME | End: 2023-01-22
Attending: FAMILY MEDICINE
Payer: COMMERCIAL

## 2023-01-01 ENCOUNTER — HOSPITAL ENCOUNTER (OUTPATIENT)
Dept: BEHAVIORAL HEALTH | Facility: CLINIC | Age: 38
Discharge: HOME | End: 2023-01-26
Attending: FAMILY MEDICINE
Payer: COMMERCIAL

## 2023-01-01 ENCOUNTER — HOSPITAL ENCOUNTER (OUTPATIENT)
Dept: BEHAVIORAL HEALTH | Facility: CLINIC | Age: 38
Discharge: HOME | End: 2023-01-29
Attending: FAMILY MEDICINE
Payer: COMMERCIAL

## 2023-01-01 ENCOUNTER — HOSPITAL ENCOUNTER (OUTPATIENT)
Dept: BEHAVIORAL HEALTH | Facility: CLINIC | Age: 38
Discharge: HOME | End: 2023-02-05
Attending: FAMILY MEDICINE
Payer: COMMERCIAL

## 2023-01-01 ENCOUNTER — HOSPITAL ENCOUNTER (OUTPATIENT)
Dept: BEHAVIORAL HEALTH | Facility: CLINIC | Age: 38
Discharge: HOME | End: 2023-01-28
Attending: FAMILY MEDICINE
Payer: COMMERCIAL

## 2023-01-01 ENCOUNTER — HOSPITAL ENCOUNTER (OUTPATIENT)
Dept: BEHAVIORAL HEALTH | Facility: CLINIC | Age: 38
Discharge: HOME | End: 2023-01-19
Attending: FAMILY MEDICINE
Payer: COMMERCIAL

## 2023-01-01 ENCOUNTER — HOSPITAL ENCOUNTER (OUTPATIENT)
Dept: BEHAVIORAL HEALTH | Facility: CLINIC | Age: 38
Discharge: HOME | End: 2023-02-01
Attending: FAMILY MEDICINE
Payer: COMMERCIAL

## 2023-01-01 ENCOUNTER — HOSPITAL ENCOUNTER (INPATIENT)
Facility: CLINIC | Age: 38
LOS: 3 days | Discharge: OTHER INSTITUTION | End: 2023-01-19
Attending: EMERGENCY MEDICINE | Admitting: PSYCHIATRY & NEUROLOGY
Payer: COMMERCIAL

## 2023-01-01 ENCOUNTER — HOSPITAL ENCOUNTER (OUTPATIENT)
Dept: BEHAVIORAL HEALTH | Facility: CLINIC | Age: 38
Discharge: HOME | End: 2023-01-20
Attending: FAMILY MEDICINE
Payer: COMMERCIAL

## 2023-01-01 ENCOUNTER — HOSPITAL ENCOUNTER (OUTPATIENT)
Dept: BEHAVIORAL HEALTH | Facility: CLINIC | Age: 38
Discharge: HOME | End: 2023-01-30
Attending: FAMILY MEDICINE
Payer: COMMERCIAL

## 2023-01-01 ENCOUNTER — HOSPITAL ENCOUNTER (OUTPATIENT)
Dept: BEHAVIORAL HEALTH | Facility: CLINIC | Age: 38
Discharge: HOME | End: 2023-01-31
Attending: FAMILY MEDICINE
Payer: COMMERCIAL

## 2023-01-01 ENCOUNTER — HOSPITAL ENCOUNTER (OUTPATIENT)
Dept: BEHAVIORAL HEALTH | Facility: CLINIC | Age: 38
Discharge: HOME | End: 2023-01-23
Attending: FAMILY MEDICINE
Payer: COMMERCIAL

## 2023-01-01 ENCOUNTER — HOSPITAL ENCOUNTER (OUTPATIENT)
Dept: BEHAVIORAL HEALTH | Facility: CLINIC | Age: 38
Discharge: HOME | End: 2023-01-24
Attending: FAMILY MEDICINE
Payer: COMMERCIAL

## 2023-01-01 ENCOUNTER — HOSPITAL ENCOUNTER (OUTPATIENT)
Dept: BEHAVIORAL HEALTH | Facility: CLINIC | Age: 38
Discharge: HOME | End: 2023-02-02
Attending: FAMILY MEDICINE
Payer: COMMERCIAL

## 2023-01-01 ENCOUNTER — HOSPITAL ENCOUNTER (OUTPATIENT)
Dept: BEHAVIORAL HEALTH | Facility: CLINIC | Age: 38
Discharge: HOME | End: 2023-02-04
Attending: FAMILY MEDICINE
Payer: COMMERCIAL

## 2023-01-01 ENCOUNTER — HOSPITAL ENCOUNTER (OUTPATIENT)
Dept: BEHAVIORAL HEALTH | Facility: CLINIC | Age: 38
Discharge: HOME | End: 2023-01-27
Attending: FAMILY MEDICINE
Payer: COMMERCIAL

## 2023-01-01 ENCOUNTER — HOSPITAL ENCOUNTER (OUTPATIENT)
Dept: BEHAVIORAL HEALTH | Facility: CLINIC | Age: 38
Discharge: HOME | End: 2023-01-25
Attending: FAMILY MEDICINE
Payer: COMMERCIAL

## 2023-01-01 VITALS — BODY MASS INDEX: 24.56 KG/M2 | WEIGHT: 197.5 LBS | HEIGHT: 75 IN

## 2023-01-01 VITALS
WEIGHT: 198 LBS | SYSTOLIC BLOOD PRESSURE: 125 MMHG | OXYGEN SATURATION: 99 % | DIASTOLIC BLOOD PRESSURE: 88 MMHG | HEIGHT: 75 IN | TEMPERATURE: 97.5 F | HEART RATE: 105 BPM | BODY MASS INDEX: 24.62 KG/M2 | RESPIRATION RATE: 16 BRPM

## 2023-01-01 DIAGNOSIS — F17.200 NICOTINE DEPENDENCE: Primary | ICD-10-CM

## 2023-01-01 DIAGNOSIS — Z20.822 LAB TEST NEGATIVE FOR COVID-19 VIRUS: ICD-10-CM

## 2023-01-01 DIAGNOSIS — F19.20 CHEMICAL DEPENDENCY (H): ICD-10-CM

## 2023-01-01 DIAGNOSIS — F10.20 SEVERE ALCOHOL USE DISORDER (H): Primary | ICD-10-CM

## 2023-01-01 DIAGNOSIS — F10.930 ALCOHOL WITHDRAWAL SYNDROME WITHOUT COMPLICATION (H): ICD-10-CM

## 2023-01-01 DIAGNOSIS — F10.229 ALCOHOL DEPENDENCE WITH INTOXICATION WITH COMPLICATION (H): ICD-10-CM

## 2023-01-01 DIAGNOSIS — F17.200 NICOTINE ADDICTION: Primary | ICD-10-CM

## 2023-01-01 LAB
ALBUMIN SERPL-MCNC: 4.5 G/DL (ref 3.4–5)
ALCOHOL BREATH TEST: 0.42 (ref 0–0.01)
ALP SERPL-CCNC: 75 U/L (ref 40–150)
ALT SERPL W P-5'-P-CCNC: 83 U/L (ref 0–70)
AMPHETAMINES UR QL SCN: NORMAL
ANION GAP SERPL CALCULATED.3IONS-SCNC: 17 MMOL/L (ref 3–14)
AST SERPL W P-5'-P-CCNC: 150 U/L (ref 0–45)
ATRIAL RATE - MUSE: 106 BPM
BARBITURATES UR QL: NORMAL
BASOPHILS # BLD AUTO: 0.1 10E3/UL (ref 0–0.2)
BASOPHILS NFR BLD AUTO: 1 %
BENZODIAZ UR QL: NORMAL
BILIRUB SERPL-MCNC: 0.5 MG/DL (ref 0.2–1.3)
BUN SERPL-MCNC: 12 MG/DL (ref 7–30)
CALCIUM SERPL-MCNC: 8.8 MG/DL (ref 8.5–10.1)
CANNABINOIDS UR QL SCN: NORMAL
CHLORIDE BLD-SCNC: 101 MMOL/L (ref 94–109)
CO2 SERPL-SCNC: 20 MMOL/L (ref 20–32)
COCAINE UR QL: NORMAL
CREAT SERPL-MCNC: 0.71 MG/DL (ref 0.66–1.25)
DIASTOLIC BLOOD PRESSURE - MUSE: NORMAL MMHG
EOSINOPHIL # BLD AUTO: 0 10E3/UL (ref 0–0.7)
EOSINOPHIL NFR BLD AUTO: 0 %
ERYTHROCYTE [DISTWIDTH] IN BLOOD BY AUTOMATED COUNT: 12.4 % (ref 10–15)
ETHANOL SERPL-MCNC: 0.39 G/DL
FLUAV RNA SPEC QL NAA+PROBE: NEGATIVE
FLUBV RNA RESP QL NAA+PROBE: NEGATIVE
GFR SERPL CREATININE-BSD FRML MDRD: >90 ML/MIN/1.73M2
GLUCOSE BLD-MCNC: 97 MG/DL (ref 70–99)
HCT VFR BLD AUTO: 45 % (ref 40–53)
HGB BLD-MCNC: 15.4 G/DL (ref 13.3–17.7)
IMM GRANULOCYTES # BLD: 0 10E3/UL
IMM GRANULOCYTES NFR BLD: 0 %
INTERPRETATION ECG - MUSE: NORMAL
LIPASE SERPL-CCNC: 45 U/L (ref 73–393)
LIPASE SERPL-CCNC: 57 U/L (ref 73–393)
LYMPHOCYTES # BLD AUTO: 1.4 10E3/UL (ref 0.8–5.3)
LYMPHOCYTES NFR BLD AUTO: 15 %
MCH RBC QN AUTO: 31.6 PG (ref 26.5–33)
MCHC RBC AUTO-ENTMCNC: 34.2 G/DL (ref 31.5–36.5)
MCV RBC AUTO: 92 FL (ref 78–100)
MONOCYTES # BLD AUTO: 0.6 10E3/UL (ref 0–1.3)
MONOCYTES NFR BLD AUTO: 6 %
NEUTROPHILS # BLD AUTO: 7.4 10E3/UL (ref 1.6–8.3)
NEUTROPHILS NFR BLD AUTO: 78 %
NRBC # BLD AUTO: 0 10E3/UL
NRBC BLD AUTO-RTO: 0 /100
OPIATES UR QL SCN: NORMAL
P AXIS - MUSE: 113 DEGREES
PLATELET # BLD AUTO: 141 10E3/UL (ref 150–450)
POTASSIUM BLD-SCNC: 3.7 MMOL/L (ref 3.4–5.3)
PR INTERVAL - MUSE: 206 MS
PROT SERPL-MCNC: 8.6 G/DL (ref 6.8–8.8)
QRS DURATION - MUSE: 98 MS
QT - MUSE: 344 MS
QTC - MUSE: 456 MS
R AXIS - MUSE: 44 DEGREES
RBC # BLD AUTO: 4.87 10E6/UL (ref 4.4–5.9)
RSV RNA SPEC NAA+PROBE: NEGATIVE
SARS-COV-2 RNA RESP QL NAA+PROBE: NEGATIVE
SODIUM SERPL-SCNC: 138 MMOL/L (ref 133–144)
SYSTOLIC BLOOD PRESSURE - MUSE: NORMAL MMHG
T AXIS - MUSE: 104 DEGREES
TROPONIN I SERPL HS-MCNC: 9 NG/L
VENTRICULAR RATE- MUSE: 106 BPM
WBC # BLD AUTO: 9.6 10E3/UL (ref 4–11)

## 2023-01-01 PROCEDURE — 128N000004 HC R&B CD ADULT

## 2023-01-01 PROCEDURE — 250N000013 HC RX MED GY IP 250 OP 250 PS 637: Performed by: PSYCHIATRY & NEUROLOGY

## 2023-01-01 PROCEDURE — 1002N00001 HC LODGING PLUS FACILITY CHARGE ADULT

## 2023-01-01 PROCEDURE — 87637 SARSCOV2&INF A&B&RSV AMP PRB: CPT | Performed by: EMERGENCY MEDICINE

## 2023-01-01 PROCEDURE — 99222 1ST HOSP IP/OBS MODERATE 55: CPT

## 2023-01-01 PROCEDURE — U0005 INFEC AGEN DETEC AMPLI PROBE: HCPCS | Performed by: FAMILY MEDICINE

## 2023-01-01 PROCEDURE — U0003 INFECTIOUS AGENT DETECTION BY NUCLEIC ACID (DNA OR RNA); SEVERE ACUTE RESPIRATORY SYNDROME CORONAVIRUS 2 (SARS-COV-2) (CORONAVIRUS DISEASE [COVID-19]), AMPLIFIED PROBE TECHNIQUE, MAKING USE OF HIGH THROUGHPUT TECHNOLOGIES AS DESCRIBED BY CMS-2020-01-R: HCPCS

## 2023-01-01 PROCEDURE — 93010 ELECTROCARDIOGRAM REPORT: CPT | Performed by: EMERGENCY MEDICINE

## 2023-01-01 PROCEDURE — 250N000013 HC RX MED GY IP 250 OP 250 PS 637

## 2023-01-01 PROCEDURE — 250N000009 HC RX 250: Performed by: EMERGENCY MEDICINE

## 2023-01-01 PROCEDURE — H2035 A/D TX PROGRAM, PER HOUR: HCPCS | Mod: HQ

## 2023-01-01 PROCEDURE — 99285 EMERGENCY DEPT VISIT HI MDM: CPT | Mod: 25 | Performed by: EMERGENCY MEDICINE

## 2023-01-01 PROCEDURE — 99223 1ST HOSP IP/OBS HIGH 75: CPT | Mod: AI | Performed by: PSYCHIATRY & NEUROLOGY

## 2023-01-01 PROCEDURE — C9803 HOPD COVID-19 SPEC COLLECT: HCPCS | Performed by: EMERGENCY MEDICINE

## 2023-01-01 PROCEDURE — HZ2ZZZZ DETOXIFICATION SERVICES FOR SUBSTANCE ABUSE TREATMENT: ICD-10-PCS | Performed by: PSYCHIATRY & NEUROLOGY

## 2023-01-01 PROCEDURE — 250N000013 HC RX MED GY IP 250 OP 250 PS 637: Performed by: EMERGENCY MEDICINE

## 2023-01-01 PROCEDURE — 36415 COLL VENOUS BLD VENIPUNCTURE: CPT

## 2023-01-01 PROCEDURE — H2032 ACTIVITY THERAPY, PER 15 MIN: HCPCS

## 2023-01-01 PROCEDURE — 258N000001 HC RX 258: Performed by: EMERGENCY MEDICINE

## 2023-01-01 PROCEDURE — 36415 COLL VENOUS BLD VENIPUNCTURE: CPT | Performed by: EMERGENCY MEDICINE

## 2023-01-01 PROCEDURE — 99207 PR NO CHARGE LOS: CPT

## 2023-01-01 PROCEDURE — 99239 HOSP IP/OBS DSCHRG MGMT >30: CPT | Performed by: PSYCHIATRY & NEUROLOGY

## 2023-01-01 PROCEDURE — U0005 INFEC AGEN DETEC AMPLI PROBE: HCPCS

## 2023-01-01 PROCEDURE — 96365 THER/PROPH/DIAG IV INF INIT: CPT | Performed by: EMERGENCY MEDICINE

## 2023-01-01 PROCEDURE — 80053 COMPREHEN METABOLIC PANEL: CPT | Performed by: EMERGENCY MEDICINE

## 2023-01-01 PROCEDURE — 80307 DRUG TEST PRSMV CHEM ANLYZR: CPT | Performed by: EMERGENCY MEDICINE

## 2023-01-01 PROCEDURE — 85025 COMPLETE CBC W/AUTO DIFF WBC: CPT | Performed by: EMERGENCY MEDICINE

## 2023-01-01 PROCEDURE — 84484 ASSAY OF TROPONIN QUANT: CPT | Performed by: EMERGENCY MEDICINE

## 2023-01-01 PROCEDURE — 83690 ASSAY OF LIPASE: CPT | Performed by: EMERGENCY MEDICINE

## 2023-01-01 PROCEDURE — 83690 ASSAY OF LIPASE: CPT

## 2023-01-01 PROCEDURE — 250N000011 HC RX IP 250 OP 636: Performed by: EMERGENCY MEDICINE

## 2023-01-01 PROCEDURE — 99231 SBSQ HOSP IP/OBS SF/LOW 25: CPT | Performed by: PSYCHIATRY & NEUROLOGY

## 2023-01-01 PROCEDURE — 93005 ELECTROCARDIOGRAM TRACING: CPT | Performed by: EMERGENCY MEDICINE

## 2023-01-01 PROCEDURE — 82077 ASSAY SPEC XCP UR&BREATH IA: CPT | Performed by: EMERGENCY MEDICINE

## 2023-01-01 PROCEDURE — 82075 ASSAY OF BREATH ETHANOL: CPT | Performed by: EMERGENCY MEDICINE

## 2023-01-01 RX ORDER — VALACYCLOVIR HYDROCHLORIDE 500 MG/1
500 TABLET, FILM COATED ORAL DAILY
Status: ON HOLD | COMMUNITY
End: 2023-01-01

## 2023-01-01 RX ORDER — ACETAMINOPHEN 325 MG/1
325-650 TABLET ORAL EVERY 4 HOURS PRN
COMMUNITY

## 2023-01-01 RX ORDER — DIAZEPAM 5 MG
5-20 TABLET ORAL EVERY 30 MIN PRN
Status: DISCONTINUED | OUTPATIENT
Start: 2023-01-01 | End: 2023-01-01

## 2023-01-01 RX ORDER — OLANZAPINE 10 MG/1
10 TABLET ORAL 3 TIMES DAILY PRN
Status: DISCONTINUED | OUTPATIENT
Start: 2023-01-01 | End: 2023-01-01 | Stop reason: HOSPADM

## 2023-01-01 RX ORDER — DIAZEPAM 5 MG
5-20 TABLET ORAL EVERY 30 MIN PRN
Status: DISCONTINUED | OUTPATIENT
Start: 2023-01-01 | End: 2023-01-01 | Stop reason: HOSPADM

## 2023-01-01 RX ORDER — GABAPENTIN 400 MG/1
400 CAPSULE ORAL 3 TIMES DAILY
Qty: 90 CAPSULE | Refills: 0 | Status: SHIPPED | OUTPATIENT
Start: 2023-01-01

## 2023-01-01 RX ORDER — HYDROXYZINE HYDROCHLORIDE 25 MG/1
25 TABLET, FILM COATED ORAL EVERY 4 HOURS PRN
Status: DISCONTINUED | OUTPATIENT
Start: 2023-01-01 | End: 2023-01-01

## 2023-01-01 RX ORDER — GABAPENTIN 300 MG/1
300 CAPSULE ORAL 3 TIMES DAILY
Status: DISCONTINUED | OUTPATIENT
Start: 2023-01-01 | End: 2023-01-01

## 2023-01-01 RX ORDER — LANOLIN ALCOHOL/MO/W.PET/CERES
3 CREAM (GRAM) TOPICAL
Qty: 30 TABLET | Refills: 0 | Status: SHIPPED | OUTPATIENT
Start: 2023-01-01

## 2023-01-01 RX ORDER — ACETAMINOPHEN 325 MG/1
650 TABLET ORAL EVERY 4 HOURS PRN
Status: DISCONTINUED | OUTPATIENT
Start: 2023-01-01 | End: 2023-01-01 | Stop reason: HOSPADM

## 2023-01-01 RX ORDER — OLANZAPINE 5 MG/1
5 TABLET, ORALLY DISINTEGRATING ORAL ONCE
Status: COMPLETED | OUTPATIENT
Start: 2023-01-01 | End: 2023-01-01

## 2023-01-01 RX ORDER — POLYETHYLENE GLYCOL 3350 17 G
2 POWDER IN PACKET (EA) ORAL
Status: DISCONTINUED | OUTPATIENT
Start: 2023-01-01 | End: 2023-01-01 | Stop reason: HOSPADM

## 2023-01-01 RX ORDER — IBUPROFEN 200 MG
400 TABLET ORAL EVERY 6 HOURS PRN
COMMUNITY

## 2023-01-01 RX ORDER — GABAPENTIN 400 MG/1
400 CAPSULE ORAL 3 TIMES DAILY
Status: DISCONTINUED | OUTPATIENT
Start: 2023-01-01 | End: 2023-01-01 | Stop reason: HOSPADM

## 2023-01-01 RX ORDER — LANOLIN ALCOHOL/MO/W.PET/CERES
3 CREAM (GRAM) TOPICAL
Status: DISCONTINUED | OUTPATIENT
Start: 2023-01-01 | End: 2023-01-01 | Stop reason: HOSPADM

## 2023-01-01 RX ORDER — NEOMYCIN/BACITRACIN/POLYMYXINB 3.5-400-5K
OINTMENT (GRAM) TOPICAL 4 TIMES DAILY
Status: DISCONTINUED | OUTPATIENT
Start: 2023-01-01 | End: 2023-01-01

## 2023-01-01 RX ORDER — NICOTINE 21 MG/24HR
1 PATCH, TRANSDERMAL 24 HOURS TRANSDERMAL DAILY
Status: DISCONTINUED | OUTPATIENT
Start: 2023-01-01 | End: 2023-01-01 | Stop reason: HOSPADM

## 2023-01-01 RX ORDER — QUETIAPINE FUMARATE 25 MG/1
25 TABLET, FILM COATED ORAL
Status: DISCONTINUED | OUTPATIENT
Start: 2023-01-01 | End: 2023-01-01

## 2023-01-01 RX ORDER — POLYETHYLENE GLYCOL 3350 17 G/17G
17 POWDER, FOR SOLUTION ORAL DAILY PRN
Status: DISCONTINUED | OUTPATIENT
Start: 2023-01-01 | End: 2023-01-01 | Stop reason: HOSPADM

## 2023-01-01 RX ORDER — AMOXICILLIN 250 MG
2 CAPSULE ORAL DAILY PRN
COMMUNITY

## 2023-01-01 RX ORDER — ONDANSETRON 8 MG/1
8 TABLET, ORALLY DISINTEGRATING ORAL EVERY 12 HOURS PRN
Status: ON HOLD | COMMUNITY
End: 2023-01-01

## 2023-01-01 RX ORDER — PHENOBARBITAL 32.4 MG/1
64.8 TABLET ORAL ONCE
Status: COMPLETED | OUTPATIENT
Start: 2023-01-01 | End: 2023-01-01

## 2023-01-01 RX ORDER — ESCITALOPRAM OXALATE 10 MG/1
10 TABLET ORAL DAILY
Status: DISCONTINUED | OUTPATIENT
Start: 2023-01-01 | End: 2023-01-01 | Stop reason: HOSPADM

## 2023-01-01 RX ORDER — ESCITALOPRAM OXALATE 5 MG/1
5 TABLET ORAL DAILY
Status: DISCONTINUED | OUTPATIENT
Start: 2023-01-01 | End: 2023-01-01

## 2023-01-01 RX ORDER — HYDROXYZINE HYDROCHLORIDE 25 MG/1
25 TABLET, FILM COATED ORAL EVERY 4 HOURS PRN
Qty: 30 TABLET | Refills: 0 | Status: SHIPPED | OUTPATIENT
Start: 2023-01-01

## 2023-01-01 RX ORDER — MAGNESIUM HYDROXIDE/ALUMINUM HYDROXICE/SIMETHICONE 120; 1200; 1200 MG/30ML; MG/30ML; MG/30ML
30 SUSPENSION ORAL EVERY 4 HOURS PRN
Status: DISCONTINUED | OUTPATIENT
Start: 2023-01-01 | End: 2023-01-01 | Stop reason: HOSPADM

## 2023-01-01 RX ORDER — METOPROLOL SUCCINATE 50 MG/1
50 TABLET, EXTENDED RELEASE ORAL DAILY
Status: DISCONTINUED | OUTPATIENT
Start: 2023-01-01 | End: 2023-01-01 | Stop reason: HOSPADM

## 2023-01-01 RX ORDER — METOPROLOL SUCCINATE 50 MG/1
50 TABLET, EXTENDED RELEASE ORAL DAILY
Qty: 30 TABLET | Refills: 0 | Status: SHIPPED | OUTPATIENT
Start: 2023-01-01

## 2023-01-01 RX ORDER — MULTIPLE VITAMINS W/ MINERALS TAB 9MG-400MCG
1 TAB ORAL DAILY
Status: DISCONTINUED | OUTPATIENT
Start: 2023-01-01 | End: 2023-01-01 | Stop reason: HOSPADM

## 2023-01-01 RX ORDER — CLOTRIMAZOLE 1 %
CREAM (GRAM) TOPICAL 2 TIMES DAILY
Status: ON HOLD | COMMUNITY
End: 2023-01-01

## 2023-01-01 RX ORDER — TRIAMCINOLONE ACETONIDE 1 MG/G
CREAM TOPICAL 2 TIMES DAILY
Status: ON HOLD | COMMUNITY
End: 2023-01-01

## 2023-01-01 RX ORDER — LANOLIN ALCOHOL/MO/W.PET/CERES
100 CREAM (GRAM) TOPICAL DAILY
Qty: 30 TABLET | Refills: 0 | Status: SHIPPED | OUTPATIENT
Start: 2023-01-01

## 2023-01-01 RX ORDER — ESCITALOPRAM OXALATE 10 MG/1
10 TABLET ORAL DAILY
Qty: 30 TABLET | Refills: 0 | Status: SHIPPED | OUTPATIENT
Start: 2023-01-01

## 2023-01-01 RX ORDER — OLANZAPINE 10 MG/2ML
10 INJECTION, POWDER, FOR SOLUTION INTRAMUSCULAR 3 TIMES DAILY PRN
Status: DISCONTINUED | OUTPATIENT
Start: 2023-01-01 | End: 2023-01-01 | Stop reason: HOSPADM

## 2023-01-01 RX ORDER — MAGNESIUM HYDROXIDE/ALUMINUM HYDROXICE/SIMETHICONE 120; 1200; 1200 MG/30ML; MG/30ML; MG/30ML
30 SUSPENSION ORAL EVERY 6 HOURS PRN
COMMUNITY

## 2023-01-01 RX ORDER — LORATADINE 10 MG/1
10 TABLET ORAL DAILY PRN
COMMUNITY

## 2023-01-01 RX ORDER — QUETIAPINE FUMARATE 25 MG/1
25-50 TABLET, FILM COATED ORAL
Status: DISCONTINUED | OUTPATIENT
Start: 2023-01-01 | End: 2023-01-01 | Stop reason: HOSPADM

## 2023-01-01 RX ORDER — HYDROXYZINE HYDROCHLORIDE 25 MG/1
25 TABLET, FILM COATED ORAL EVERY 4 HOURS PRN
Status: DISCONTINUED | OUTPATIENT
Start: 2023-01-01 | End: 2023-01-01 | Stop reason: HOSPADM

## 2023-01-01 RX ORDER — MULTIPLE VITAMINS W/ MINERALS TAB 9MG-400MCG
1 TAB ORAL DAILY
Qty: 30 TABLET | Refills: 0 | Status: SHIPPED | OUTPATIENT
Start: 2023-01-01

## 2023-01-01 RX ORDER — QUETIAPINE FUMARATE 25 MG/1
25-50 TABLET, FILM COATED ORAL
Qty: 30 TABLET | Refills: 1 | Status: SHIPPED | OUTPATIENT
Start: 2023-01-01

## 2023-01-01 RX ADMIN — GABAPENTIN 400 MG: 400 CAPSULE ORAL at 20:24

## 2023-01-01 RX ADMIN — DIAZEPAM 5 MG: 5 TABLET ORAL at 20:23

## 2023-01-01 RX ADMIN — DIAZEPAM 10 MG: 5 TABLET ORAL at 20:26

## 2023-01-01 RX ADMIN — QUETIAPINE FUMARATE 50 MG: 25 TABLET ORAL at 20:23

## 2023-01-01 RX ADMIN — NICOTINE POLACRILEX 2 MG: 2 LOZENGE ORAL at 20:24

## 2023-01-01 RX ADMIN — ESCITALOPRAM OXALATE 10 MG: 10 TABLET ORAL at 09:08

## 2023-01-01 RX ADMIN — ESCITALOPRAM 5 MG: 5 TABLET, FILM COATED ORAL at 08:45

## 2023-01-01 RX ADMIN — DIAZEPAM 10 MG: 5 TABLET ORAL at 00:25

## 2023-01-01 RX ADMIN — HYDROXYZINE HYDROCHLORIDE 25 MG: 25 TABLET, FILM COATED ORAL at 11:39

## 2023-01-01 RX ADMIN — PHENOBARBITAL 64.8 MG: 32.4 TABLET ORAL at 02:15

## 2023-01-01 RX ADMIN — NICOTINE POLACRILEX 2 MG: 2 LOZENGE ORAL at 18:50

## 2023-01-01 RX ADMIN — GABAPENTIN 400 MG: 400 CAPSULE ORAL at 13:23

## 2023-01-01 RX ADMIN — NICOTINE 1 PATCH: 21 PATCH, EXTENDED RELEASE TRANSDERMAL at 08:47

## 2023-01-01 RX ADMIN — GABAPENTIN 300 MG: 300 CAPSULE ORAL at 08:52

## 2023-01-01 RX ADMIN — GABAPENTIN 300 MG: 300 CAPSULE ORAL at 14:44

## 2023-01-01 RX ADMIN — HYDROXYZINE HYDROCHLORIDE 25 MG: 25 TABLET, FILM COATED ORAL at 20:24

## 2023-01-01 RX ADMIN — DIAZEPAM 10 MG: 5 TABLET ORAL at 05:01

## 2023-01-01 RX ADMIN — DIAZEPAM 10 MG: 5 TABLET ORAL at 12:37

## 2023-01-01 RX ADMIN — HYDROXYZINE HYDROCHLORIDE 25 MG: 25 TABLET, FILM COATED ORAL at 16:16

## 2023-01-01 RX ADMIN — FOLIC ACID: 5 INJECTION, SOLUTION INTRAMUSCULAR; INTRAVENOUS; SUBCUTANEOUS at 16:24

## 2023-01-01 RX ADMIN — OLANZAPINE 5 MG: 5 TABLET, ORALLY DISINTEGRATING ORAL at 19:40

## 2023-01-01 RX ADMIN — THIAMINE HCL TAB 100 MG 100 MG: 100 TAB at 08:49

## 2023-01-01 RX ADMIN — DIAZEPAM 10 MG: 5 TABLET ORAL at 08:52

## 2023-01-01 RX ADMIN — NICOTINE POLACRILEX 2 MG: 2 LOZENGE ORAL at 11:39

## 2023-01-01 RX ADMIN — NICOTINE POLACRILEX 2 MG: 2 LOZENGE ORAL at 20:26

## 2023-01-01 RX ADMIN — MELATONIN TAB 3 MG 3 MG: 3 TAB at 20:24

## 2023-01-01 RX ADMIN — THIAMINE HCL TAB 100 MG 100 MG: 100 TAB at 09:07

## 2023-01-01 RX ADMIN — GABAPENTIN 300 MG: 300 CAPSULE ORAL at 08:45

## 2023-01-01 RX ADMIN — METOPROLOL SUCCINATE 50 MG: 50 TABLET, EXTENDED RELEASE ORAL at 08:52

## 2023-01-01 RX ADMIN — MULTIPLE VITAMINS W/ MINERALS TAB 1 TABLET: TAB at 09:08

## 2023-01-01 RX ADMIN — NICOTINE POLACRILEX 2 MG: 2 LOZENGE ORAL at 12:18

## 2023-01-01 RX ADMIN — MULTIPLE VITAMINS W/ MINERALS TAB 1 TABLET: TAB at 08:45

## 2023-01-01 RX ADMIN — METOPROLOL SUCCINATE 50 MG: 50 TABLET, EXTENDED RELEASE ORAL at 08:45

## 2023-01-01 RX ADMIN — GABAPENTIN 400 MG: 400 CAPSULE ORAL at 09:07

## 2023-01-01 RX ADMIN — GABAPENTIN 300 MG: 300 CAPSULE ORAL at 20:26

## 2023-01-01 RX ADMIN — DIAZEPAM 10 MG: 5 TABLET ORAL at 22:51

## 2023-01-01 RX ADMIN — NICOTINE 1 PATCH: 21 PATCH, EXTENDED RELEASE TRANSDERMAL at 09:08

## 2023-01-01 RX ADMIN — THIAMINE HCL TAB 100 MG 100 MG: 100 TAB at 08:53

## 2023-01-01 RX ADMIN — NICOTINE POLACRILEX 2 MG: 2 LOZENGE ORAL at 18:02

## 2023-01-01 RX ADMIN — DIAZEPAM 10 MG: 5 TABLET ORAL at 21:12

## 2023-01-01 RX ADMIN — QUETIAPINE FUMARATE 25 MG: 25 TABLET ORAL at 20:26

## 2023-01-01 RX ADMIN — ESCITALOPRAM 5 MG: 5 TABLET, FILM COATED ORAL at 11:18

## 2023-01-01 RX ADMIN — NICOTINE POLACRILEX 2 MG: 2 LOZENGE ORAL at 08:51

## 2023-01-01 RX ADMIN — DIAZEPAM 10 MG: 5 TABLET ORAL at 17:52

## 2023-01-01 RX ADMIN — NICOTINE 1 PATCH: 21 PATCH, EXTENDED RELEASE TRANSDERMAL at 08:52

## 2023-01-01 RX ADMIN — MULTIPLE VITAMINS W/ MINERALS TAB 1 TABLET: TAB at 08:54

## 2023-01-01 RX ADMIN — NICOTINE POLACRILEX 2 MG: 2 LOZENGE ORAL at 16:16

## 2023-01-01 RX ADMIN — QUETIAPINE FUMARATE 25 MG: 25 TABLET ORAL at 22:51

## 2023-01-01 RX ADMIN — NICOTINE POLACRILEX 2 MG: 2 LOZENGE ORAL at 09:07

## 2023-01-01 RX ADMIN — DIAZEPAM 10 MG: 5 TABLET ORAL at 16:23

## 2023-01-01 RX ADMIN — METOPROLOL SUCCINATE 50 MG: 50 TABLET, EXTENDED RELEASE ORAL at 09:08

## 2023-01-01 ASSESSMENT — ACTIVITIES OF DAILY LIVING (ADL)
ADLS_ACUITY_SCORE: 37
ADLS_ACUITY_SCORE: 40
ADLS_ACUITY_SCORE: 40
HYGIENE/GROOMING: INDEPENDENT
DRESS: SCRUBS (BEHAVIORAL HEALTH)
ADLS_ACUITY_SCORE: 40
HYGIENE/GROOMING: INDEPENDENT
ADLS_ACUITY_SCORE: 37
ORAL_HYGIENE: INDEPENDENT
ADLS_ACUITY_SCORE: 40
ADLS_ACUITY_SCORE: 40
LAUNDRY: WITH SUPERVISION
ADLS_ACUITY_SCORE: 40
LAUNDRY: WITH SUPERVISION
ADLS_ACUITY_SCORE: 40
ADLS_ACUITY_SCORE: 40
ADLS_ACUITY_SCORE: 35
ADLS_ACUITY_SCORE: 40
DRESS: INDEPENDENT
DRESS: INDEPENDENT
ORAL_HYGIENE: INDEPENDENT
DRESS: INDEPENDENT
ADLS_ACUITY_SCORE: 40
ORAL_HYGIENE: INDEPENDENT
ADLS_ACUITY_SCORE: 40
LAUNDRY: WITH SUPERVISION
ADLS_ACUITY_SCORE: 40
HYGIENE/GROOMING: INDEPENDENT
ADLS_ACUITY_SCORE: 40
ADLS_ACUITY_SCORE: 40
HYGIENE/GROOMING: INDEPENDENT
HYGIENE/GROOMING: INDEPENDENT
ORAL_HYGIENE: INDEPENDENT
ADLS_ACUITY_SCORE: 40
ORAL_HYGIENE: INDEPENDENT
DRESS: INDEPENDENT
ADLS_ACUITY_SCORE: 40
ADLS_ACUITY_SCORE: 40
ADLS_ACUITY_SCORE: 37
ADLS_ACUITY_SCORE: 40

## 2023-01-01 ASSESSMENT — COLUMBIA-SUICIDE SEVERITY RATING SCALE - C-SSRS
1. HAVE YOU WISHED YOU WERE DEAD OR WISHED YOU COULD GO TO SLEEP AND NOT WAKE UP?: NO
TOTAL  NUMBER OF ABORTED OR SELF INTERRUPTED ATTEMPTS LIFETIME: NO
6. HAVE YOU EVER DONE ANYTHING, STARTED TO DO ANYTHING, OR PREPARED TO DO ANYTHING TO END YOUR LIFE?: NO
ATTEMPT LIFETIME: NO
2. HAVE YOU ACTUALLY HAD ANY THOUGHTS OF KILLING YOURSELF?: NO
TOTAL  NUMBER OF INTERRUPTED ATTEMPTS LIFETIME: NO

## 2023-01-01 ASSESSMENT — ANXIETY QUESTIONNAIRES
GAD7 TOTAL SCORE: 10
1. FEELING NERVOUS, ANXIOUS, OR ON EDGE: MORE THAN HALF THE DAYS
7. FEELING AFRAID AS IF SOMETHING AWFUL MIGHT HAPPEN: NOT AT ALL
4. TROUBLE RELAXING: NEARLY EVERY DAY
GAD7 TOTAL SCORE: 10
GAD7 TOTAL SCORE: 12
4. TROUBLE RELAXING: MORE THAN HALF THE DAYS
3. WORRYING TOO MUCH ABOUT DIFFERENT THINGS: SEVERAL DAYS
IF YOU CHECKED OFF ANY PROBLEMS ON THIS QUESTIONNAIRE, HOW DIFFICULT HAVE THESE PROBLEMS MADE IT FOR YOU TO DO YOUR WORK, TAKE CARE OF THINGS AT HOME, OR GET ALONG WITH OTHER PEOPLE: VERY DIFFICULT
6. BECOMING EASILY ANNOYED OR IRRITABLE: SEVERAL DAYS
3. WORRYING TOO MUCH ABOUT DIFFERENT THINGS: MORE THAN HALF THE DAYS
5. BEING SO RESTLESS THAT IT IS HARD TO SIT STILL: MORE THAN HALF THE DAYS
6. BECOMING EASILY ANNOYED OR IRRITABLE: SEVERAL DAYS
7. FEELING AFRAID AS IF SOMETHING AWFUL MIGHT HAPPEN: SEVERAL DAYS
1. FEELING NERVOUS, ANXIOUS, OR ON EDGE: NEARLY EVERY DAY
IF YOU CHECKED OFF ANY PROBLEMS ON THIS QUESTIONNAIRE, HOW DIFFICULT HAVE THESE PROBLEMS MADE IT FOR YOU TO DO YOUR WORK, TAKE CARE OF THINGS AT HOME, OR GET ALONG WITH OTHER PEOPLE: VERY DIFFICULT
5. BEING SO RESTLESS THAT IT IS HARD TO SIT STILL: MORE THAN HALF THE DAYS
2. NOT BEING ABLE TO STOP OR CONTROL WORRYING: SEVERAL DAYS
GAD7 TOTAL SCORE: 12
2. NOT BEING ABLE TO STOP OR CONTROL WORRYING: SEVERAL DAYS

## 2023-01-01 ASSESSMENT — PATIENT HEALTH QUESTIONNAIRE - PHQ9
SUM OF ALL RESPONSES TO PHQ QUESTIONS 1-9: 15
SUM OF ALL RESPONSES TO PHQ QUESTIONS 1-9: 18

## 2023-01-16 NOTE — ED PROVIDER NOTES
[facility] EMERGENCY DEPARTMENT ([facility])    1/16/23      ED PROVIDER NOTE        History     Chief Complaint   Patient presents with     Alcohol Problem     Seeking detox, drinks 750 ml of clear liquor daily, last drink 3 hours prior to arrival, reports a history of withdrawal seizures and DTs, last seizure over a year ago     The history is provided by the patient, medical records and a parent.     Frank Arguello is a 37 year old male with PMHx of severe alcohol use disorder, alcohol detox with seizures/DTs, adjustment disorder with anxious mood, depression, HTN, herpes simplex infection of the penis, polysubstance abuse (benzos, narcotics, Adderall), overdose on opiates,  chronic pancreatitis, pancreatic pseudocyst, steatosis of the liver, tobacco dependence, and GERD who presents to the ED seeking detox.  Patient reports that he has been drinking 750 mL of clear liquor/day for over 2 weeks.  His last drink was 3 hours prior to arrival.  He has a history of withdrawal seizures and DTs.  His last seizure was over a year ago.  His mother also states that he required intubation previously.  He adds that he uses THC and benzos (5 mg every 2 hours), last use less than 48 hours ago.  He denies fevers, chills, cough, or sore throat.  He denies any chest pain or shortness of breath.  He endorses feeling shaky.    Past Medical History  Past Medical History:   Diagnosis Date     Anxiety      Depressive disorder      Hypertension      Pancreatitis      Pseudocyst of pancreas      Substance abuse (H)      Past Surgical History:   Procedure Laterality Date     ABDOMEN SURGERY       ENDOSCOPIC RETROGRADE CHOLANGIOPANCREATOGRAM  4/9/2014    Procedure: ENDOSCOPIC RETROGRADE CHOLANGIOPANCREATOGRAM;   Endoscopic Ultrasound with Fine needle aspiration, EsophagoGastroDuodenoscopy with stent placement into pseudeocyst, balloon dilation.  ;  Surgeon: Jagjit Dow MD;  Location: [facility] OR     ESOPHAGOSCOPY, GASTROSCOPY,  DUODENOSCOPY (EGD), COMBINED  2014    Procedure: COMBINED ENDOSCOPIC ULTRASOUND, ESOPHAGOSCOPY, GASTROSCOPY, DUODENOSCOPY (EGD), FINE NEEDLE ASPIRATE/BIOPSY;;  Surgeon: Jagjit Dow MD;  Location: [facility] OR     ESOPHAGOSCOPY, GASTROSCOPY, DUODENOSCOPY (EGD), COMBINED  2014    Procedure: COMBINED ESOPHAGOSCOPY, GASTROSCOPY, DUODENOSCOPY (EGD);  Surgeon: Jagjit Dow MD;  Location: [facility] OR     gabapentin (NEURONTIN) 300 MG capsule  hydrOXYzine (ATARAX) 25 MG tablet  metoprolol succinate ER (TOPROL XL) 50 MG 24 hr tablet  multivitamin w/minerals (THERA-VIT-M) tablet  naloxone (NARCAN) 4 MG/0.1ML nasal spray  neomycin-bacitracin-polymyxin (NEOSPORIN) 5-400-5000 ointment  nicotine (NICODERM CQ) 21 MG/24HR 24 hr patch  nicotine (NICORETTE) 2 MG gum  QUEtiapine (SEROQUEL) 25 MG tablet  thiamine (B-1) 100 MG tablet      Allergies   Allergen Reactions     Trazodone Other (See Comments)     erection     Family History  Family History   Problem Relation Age of Onset     Alcohol/Drug Paternal Grandfather      Alcohol/Drug Father      Substance Abuse Father      Hypertension Mother      Other - See Comments Brother          of heroin overdose     Substance Abuse Brother      Hyperlipidemia Maternal Grandmother      Diabetes No family hx of      Cancer No family hx of      Unknown/Adopted No family hx of      Depression No family hx of      Anxiety Disorder No family hx of      Schizophrenia No family hx of      Bipolar Disorder No family hx of      Suicide No family hx of      Dementia No family hx of      [place] Disease No family hx of      Parkinsonism No family hx of      Autism Spectrum Disorder No family hx of      Intellectual Disability (Mental Retardation) No family hx of      Mental Illness No family hx of      Suicidality Brother      Social History   Social History     Tobacco Use     Smoking status: Some Days     Packs/day: 0.50     Types: Cigarettes   Substance Use Topics     Alcohol use:  Yes     Comment: 1 pint and 6 beers per day     Drug use: Not Currently      Past medical history, past surgical history, medications, allergies, family history, and social history were reviewed with the patient. No additional pertinent items.      A medically appropriate review of systems was performed with pertinent positives and negatives noted in the HPI, and all other systems negative.    Physical Exam   BP: (!) 140/96  Pulse: 120  Temp: 98.3  F (36.8  C)  Resp: 16  Weight: 87.1 kg (192 lb)  SpO2: 96 %  Physical Exam  Constitutional:       General: He is not in acute distress.     Appearance: Normal appearance. He is not diaphoretic.   HENT:      Head: Atraumatic.   Eyes:      General: No scleral icterus.     Pupils: Pupils are equal, round, and reactive to light.   Cardiovascular:      Rate and Rhythm: Regular rhythm. Tachycardia present.      Heart sounds: Normal heart sounds.   Pulmonary:      Effort: No respiratory distress.      Breath sounds: Normal breath sounds.   Abdominal:      General: Bowel sounds are normal.      Palpations: Abdomen is soft.      Tenderness: There is no abdominal tenderness.   Musculoskeletal:         General: No tenderness.   Skin:     General: Skin is warm.      Findings: No rash.   Neurological:      General: No focal deficit present.      Mental Status: He is alert and oriented to person, place, and time.   Psychiatric:         Behavior: Behavior normal.      Comments: Tearful affect             ED Course, Procedures, & Data     3:40 PM  The patient was seen and examined by Rhona Suero MD in [facility].     Procedures                 Results for orders placed or performed during the hospital encounter of 01/16/23   EKG 12-lead, tracing only     Status: None (Preliminary result)   Result Value Ref Range    Systolic Blood Pressure  mmHg    Diastolic Blood Pressure  mmHg    Ventricular Rate 106 BPM    Atrial Rate 106 BPM    HI Interval 206 ms    QRS Duration 98 ms     ms     QTc 456 ms    P Axis 113 degrees    R AXIS 44 degrees    T Axis 104 degrees    Interpretation ECG       Sinus tachycardia  ST elevation consider inferior injury or acute infarct  ** ** ACUTE MI / STEMI ** **  Consider right ventricular involvement in acute inferior infarct  Abnormal ECG     Alcohol breath test POCT     Status: Abnormal   Result Value Ref Range    Alcohol Breath Test 0.416 (A) 0.00 - 0.01   Urine Drugs of Abuse Screen     Status: None (In process)    Narrative    The following orders were created for panel order Urine Drugs of Abuse Screen.  Procedure                               Abnormality         Status                     ---------                               -----------         ------                     Drug abuse screen 1 urin...[[id number]]                      In process                   Please view results for these tests on the individual orders.   CBC with platelets differential     Status: None ()    Narrative    The following orders were created for panel order CBC with platelets differential.  Procedure                               Abnormality         Status                     ---------                               -----------         ------                     CBC with platelets and d...[[id number]]                                                   Please view results for these tests on the individual orders.     Medications - No data to display  Labs Ordered and Resulted from Time of ED Arrival to Time of ED Departure   ALCOHOL BREATH TEST POCT - Abnormal       Result Value    Alcohol Breath Test 0.416 (*)    COMPREHENSIVE METABOLIC PANEL   ETHYL ALCOHOL LEVEL   DRUG ABUSE SCREEN 1 URINE (ED)   CBC WITH PLATELETS AND DIFFERENTIAL     No orders to display          Medical Decision Making  The patient presented with a problem that is a chronic illness severe exacerbation, progression, or side effect of treatment.    The patient's evaluation involved:  an assessment requiring an  independent historian (see separate area of note for details)  review of 1 prior external note(s) (see separate area of note for details)  ordering and review of 3+ test(s) (see separate area of note for details)    The patient's management involved prescription drug management, drug therapy requiring intensive monitoring and a decision regarding hospitalization.      Assessment & Plan      37 year old male with PMHx of severe alcohol use disorder, alcohol detox with seizures/DTs, adjustment disorder with anxious mood, depression, HTN, herpes simplex infection of the penis, polysubstance abuse (benzos, narcotics, Adderall), overdose on opiates,  chronic pancreatitis, pancreatic pseudocyst, steatosis of the liver, tobacco dependence, and GERD who presents to the ED seeking detox.  Vital signs in triage notable for elevated blood pressure 140/96  Pulse to 120 but otherwise afebrile with a normal pulse ox at 96% on room air.  IV was established, labs drawn sent reviewed document epic remarkable for serum alcohol is 0.39 and a transaminitis with AST ALT of 150/83 otherwise stable CBC electrolytes.  Serum alcohol elevated 0.39, influenza and COVID swabs negative.    Patient is given banana bag here in the emergency department and started on MSSA protocol.  He was given Zyprexa 5 mg ODT as an antiemetic and for his epigastric discomfort.  Patient had a lipase and troponin added on his regimen.  Case discussed with behavioral intake with agreement to admit to inpatient status for alcohol detox.    I have reviewed the nursing notes. I have reviewed the findings, diagnosis, plan and need for follow up with the patient.    New Prescriptions    No medications on file       Final diagnoses:   Alcohol withdrawal syndrome without complication (H)     I, Betzy Caruso, am serving as a trained medical scribe to document services personally performed by Rhona Suero MD based on the provider's statements to me on January 16, 2023.   This document has been checked and approved by the attending provider.    I, Rhona Suero MD, was physically present and have reviewed and verified the accuracy of this note documented by Betzy Caruso, medical scribe.          Rhona Suero MD  [facility] EMERGENCY DEPARTMENT  1/16/2023     Rhona Suero MD  01/16/23 [id number]

## 2023-01-16 NOTE — ED TRIAGE NOTES
Triage Assessment     Row Name 01/16/23 1449       Triage Assessment (Adult)    Airway WDL WDL       Respiratory WDL    Respiratory WDL WDL       Skin Circulation/Temperature WDL    Skin Circulation/Temperature WDL WDL       Cardiac WDL    Cardiac WDL WDL       Peripheral/Neurovascular WDL    Peripheral Neurovascular WDL WDL       Cognitive/Neuro/Behavioral WDL    Cognitive/Neuro/Behavioral WDL WDL

## 2023-01-17 NOTE — H&P
Identifying information  Patient is a 37-year-old  male      Chief complaint alcohol      Below is the emergency room note  Alcohol Problem     Seeking detox, drinks 750 ml of clear liquor daily, last drink 3 hours prior to arrival, reports a history of withdrawal seizures and DTs, last seizure over a year ago                     The history is provided by the patient, medical records and a parent.      Frank Arguello is a 37 year old male with PMHx of severe alcohol use disorder, alcohol detox with seizures/DTs, adjustment disorder with anxious mood, depression, HTN, herpes simplex infection of the penis, polysubstance abuse (benzos, narcotics, Adderall), overdose on opiates,  chronic pancreatitis, pancreatic pseudocyst, steatosis of the liver, tobacco dependence, and GERD who presents to the ED seeking detox.  Patient reports that he has been drinking 750 mL of clear liquor/day for over 2 weeks.  His last drink was 3 hours prior to arrival.  He has a history of withdrawal seizures and DTs.  His last seizure was over a year ago.  His mother also states that he required intubation previously.  He adds that he uses THC and benzos (5 mg every 2 hours), last use less than 48 hours ago.  He denies fevers, chills, cough, or sore throat.  He denies any chest pain or shortness of breath.  He endorses feeling shaky.       During my interview with the patient patient reports after he left detox he was sober for 2 months  Reports he had financial stress stressed with his job and he relapsed.  He reports has been drinking for 1 week and he has been drinking 1 pint to 750 mL of hard liquor.  Patient tried to self detox himself to detox himself by getting benzos but he was not able to do so .  He realized that he needs to get help and came to the emergency room .  He was having the following withdrawal symptoms withdrawal shakes fatigue .      patient has been using the following substances: Alcohol  Started at age 17,  became a problem at 27        Patient has tolerance, withdrawal, progressive use, loss of control, spending more time and more amount than intended. Patient has made attempts to quit, is experiencing cravings, and reports negative consequences.                   Alcohol    USE DISORDER - CRITERIA  +admits to taking larger amounts than initially intended  +admits to unsuccessful efforts to cut down or control use  +admits to spending a great deal of time in activities necessary to obtain, use and recover from  +admits to cravings or a strong desire to use   + admits to failure to fulfill obligations at work, school or home  + admits to continued use despite negative consequences  + admits to giving up important activities to use  +admits to use in situations in which it is physically dangerous           Patient does have a history of seizures.  Patient does have a history of delirium tremens.        Patient reports he has been getting Valium off the streets   He is taking 5 mg tablets about 4 of them for a week.     Patient has a prior history to opiates as below last use of opiates was when he overdosed on methadone  On 9/28 9/30 patient had overdosed on methadone.  He was given Narcan and discharged after being hospitalized.  Denies thoughts of suicide or harming others.       Denies auditory or visual hallucinations.      Patient smokes 1ppd     Patient denied any gambling     Substance Age first use First became regular or problematic Most recent use   Alcohol         Cannabis none       Cocaine NONE       Stimulants NONE       Opioids 22   has a history of abusing pain pills switched to heroin he was on methadone maintenance but he also got off of methadone maintenance since approximately when he was 29 9/30 he took remaining methadone that he had at home   Sedatives 29     Hallucinogens NONE       Inhalants NONE       Other         OTC drugs NONE       Nicotine            Patient does have a history of  overdose.  Patient does have a history of IV use.  Patient does not have a history of hepatitis, HIV,        PSYCHIATRIC REVIEW OF SYSTEMS:          Psychiatric Review of Systems:   Depression:     Denies: depressed mood, suicidal ideation, decreased interest, changes in sleep, changes in appetite, guilt, hopelessness, helplessness, impaired concentration, decreased energy, irritability.  Colette:         Denies: sleeplessness, increased goal-directed activities, abrupt increase in energy, pressured speech   impulsiveness, racing thoughts, increased goal-directed activities, pressured speech, increase in energy  Colette Feeling euphoric,Distractible,Impulsive,Grandiose,Talking excessively,Have energy without sleeping,Mood swings,Irritability  Psychosis:   .  Denies: visual hallucinations, auditory hallucinations, paranoia  Anxiety: h/o mic   Reports his anxiety is through the roof Anxious , cant stop thnking racing thoughts, poor sleep        Reports  Panic attacks  problemFocus  , sob,      Palpitation pounding heart trembling  shortness of breath feeling of choking chest pain  No nausea feeling dizzy chills numbness      Come out of the blue    Denies: worries that are difficult to control for the past 6 months, panic attacks        PTSD: Patient was exposed to a traumatic event  Pt ptsd is stable   H/o  re-experiencing past trauma, nightmares, trust issues, flashbacks,increased arousal, avoidance of traumatic stimuli,   OCD:   denies obsessions, patient has compulsions checking, counting symmetry, cleaning, skin picking.     ED:      Denies: restriction, binging, purging.            patient denies :symptoms of attention deficit disorder include a failure to pay attention to detail, a pattern of careless mistakes, a pattern of inattentive listening, a failure to follow through with projects, poor personal organization, losing necessary objects, distractibility, forgetfulness.                           PSYCHIATRIC  "HISTORY      Previous diagnoses: ptsd, mic              Past court commitments: none  SIB /SUICIDE ATTEMPTS NONE  Psych Hosp :none  Outpatient Programs none  Inpatient cd trt 1 lp   Out pt cd trt 2  Detoxes 3-4  PAST PSYCH MED TRIALS      Methadone      SOCIAL HISTORY                                                                               Patient is single has s/o   Patient has 0  children  Patient is employed as a   pest control  Patient's housing is own  housing          Family History:   FAMILY HISTORY:   Family History         Family History   Problem Relation Age of Onset     Alcohol/Drug Paternal Grandfather       Alcohol/Drug Father       Substance Abuse Father       Hypertension Mother       Other - See Comments Brother            of heroin overdose     Substance Abuse Brother       Hyperlipidemia Maternal Grandmother       Diabetes No family hx of       Cancer No family hx of       Unknown/Adopted No family hx of       Depression No family hx of       Anxiety Disorder No family hx of       Schizophrenia No family hx of       Bipolar Disorder No family hx of       Suicide No family hx of       Dementia No family hx of       [place] Disease No family hx of       Parkinsonism No family hx of       Autism Spectrum Disorder No family hx of       Intellectual Disability (Mental Retardation) No family hx of       Mental Illness No family hx of       Suicidality Brother           Family Mental Health History-  none     Substance Use Problems - present for        Medical h/o   A 10-point review of systems is reviewed and is negative except for psychiatric symptoms above.       Allergies reviewed  Blood pressure (!) 142/95, pulse 108, temperature 97.6  F (36.4  C), temperature source Temporal, resp. rate 16, height 1.905 m (6' 3\"), weight 89.8 kg (198 lb), SpO2 95 %.      vitals  Appearance:  awake, alert, appeared as age stated, adequate groomed and slightly unkempt  Attitude:  cooperative  Eye Contact: "  good  Mood:   Depressed  Affect:  congruent   Speech:  clear, coherent normal rate   Psychomotor Behavior:  no evidence of tardive dyskinesia, dystonia, or tics  Thought Process:  logical, linear and goal oriented  Associations:  no loose associations  Thought Content:  no evidence of psychotic thought and active suicidal ideation present  Denied any active suicidal /homicidation ideation plan intent   Insight:  fair  Judgment:  fair  Oriented to:  time, person, and place  Attention Span and Concentration:  intact  Recent and Remote Memory:  intact  Language:  english with appropriate syntax and vocabulary  Fund of Knowledge: appropriate  Muscle Strength and Tone: normal  Gait and Station: Normal        Assessment  Patient has severe exacerbation of chronic alcoholism  ,  been unable to stop using  in the community due to both physical and psychological symptoms.  Continued use will put the patient at risk for medical and/or psychiatric complications.         Patient has a biological predisposition with family history positive for mental illness chemical dependency  Psychologically patient is experiencing abusing alcohol nicotine patient has a history of anxiety history of PTSD  Patient has these particular stressors work money  Patient has chronic illness exacerbation leading to hospitalization progression as described.     Patient has been unable to stop using drugs in the community due to both physical and psychological symptoms.  Continued use will put the patient at risk for medical and/or psychiatric complications.        Inpatient psychiatric hospitalization is warranted at this time for safety, stabilization, and possible adjustment in medications.       Diagnoses:    Alcohol use disorder severe  Alcohol withdrawal severe  Benzodiazepine abuse  History of opiate use disorder presently in early remission  Nicotine dependence  History of PTSD  History of generalized anxiety disorder       Plan  Alcohol use  disorder severe  Alcohol withdrawal severe    Patient has a pulse of 108 blood pressure 142/95 he has eating disturbance tremor sleep disturbance agitation sweats  He scored a 12 and received 10 mg of diazepam since his admission is required 65 mg of diazepam    Patient's benzodiazepine use is only for a week he does not need detox from it  He did receive 64 mg of phenobarbital yesterday  We will discuss with medicine/pharmacy about it    Patient is willing to take Lexapro for his depression and anxiety will start with 10 mg daily    Patient has elevated liver enzymes  and ALT 83 most likely from alcoholism nonviral suspected we will put internal medicine consult    Patient has thrombocytopenia platelets of 141 most likely from bone marrow suppression with parental medicine consult    Patient has elevated anion gap 17    I HAVE REVIEWED LABS WITH PT AND TALKED ABOUT RESULTS WITH PT  I HAVE REVIEWED AND SUMMARIZED OLD RECORDS including his medication reconcilation of his home medications  and PDMP   I HAVE SPOKEN WITH RN ABOUT MEDICATIONS AND withdrawl SCORES  I HAVE SPOKEN WITH CM ABOUT PTS TREATMETN OPTIONS     Discussed in detail about patient's smoking patient was advised to quit patient was told about the impact of smoking.  Patient's willingness to quit was assessed.  I provided methods and skills for cessation including medication management nicotine gum patch.  Patient did not set a quit date.  Patient is interested in quitting .we discussed pharmacotherapy options .patient agreed to take nicotine gum patch lozenge.  We discussed behavioral change techniques when craving nicotine including deep breathing drinking glass of water, taking a walk.

## 2023-01-17 NOTE — ED PROVIDER NOTES
Patient received as sign-out at change of shift.  Please see initial note for complete details.  37 year old male who presented to the ED with alcohol intoxication seeking detox.  Awaiting lipase and troponin which resulted normal.  Acute events during this shift: none.  Proceed with plan for detox admission.     Ramiro Haywood MD  01/16/23 1955

## 2023-01-17 NOTE — TELEPHONE ENCOUNTER
S: [facility] , Provider Pio calling at 6:44pm with clinical on a 37 year old/Male presenting for alcohol detox.     B: Pt presents for ETOH detox.   Currently reports drinking 750ml of clear liquor for the last 2 weeks or longer.  His last use was 3 hours prior to arrival. Pt feels shaky when in withdrawal.  He has a hx of w/d seizures or DTs.  He reports using THC and Benzos; his last use was about 48 hours ago.  Pt has stable lab work.  He came in with a 0.39.  He's on the MSSA and tolerating.  Pt has no chronic medical illness.  He ambulates independently and is medically cleared.  Vol.  Calm and cooperative.       Patient reports last use was 3 hrs ago. .  Pt BEATRIS: 0.416  Pt  endorses hx of DT  Pt  endorses hx of seizures. Last seizure: 1 yr ago.  Pt endorsing the following symptoms of withdrawal: Shaky / Jittery / Tremors  MSSA Score: Unknown    Pt denies acute mental health or medical concerns.   Pt endorses other drug use: Cannabis & Benzo Amount/frequency: Benzos: 5 mg every 2 hrs    Does Pt have a detox care plan in [facility]? No  Does pt present with specific needs, assistive devices, or exclusionary criteria? None  Is the patient ambulating, eating and drinking in the ED? Yes    A: Pt meets criteria to be presented for IP detox admission. Patient is voluntary  COVID: Negative  Utox: Positive for Amphetamines and Benzos  CMP: WNL  CBC: Abnormalities: WTW329, ALT83  HCG: N/A     R: Patient cleared and ready for behavioral bed placement: Yes    Presenting for admission to 3A/CD     9:12pm- Dr. Johnston accepts for 3A/[place].  CD Admit.

## 2023-01-17 NOTE — PLAN OF CARE
Behavioral Team Discussion: (1/17/2023)    Continued Stay Criteria/Rationale: Patient admitted for alcohol withdrawal, complicated.  Plan: The following services will be provided to the patient; psychiatric assessment, medication management, therapeutic milieu, individual and group support, and skills groups.   Participants: 3A Provider: Dr. Joey Solorio MD; 3A RN: Bryon Dunn RN; 3A CM's: Jeanie Hare, [facility]  Summary/Recommendation: Providers will assess today for treatment recommendations, discharge planning, and aftercare plans. CM will meet with pt for discharge planning.   Medical/Physical: Internal medicine consult to be completed 1/17/2023.  Precautions:   Behavioral Orders   Procedures     Code 1 - Restrict to Unit     Routine Programming     As clinically indicated     Status 15     Every 15 minutes.     Rationale for change in precautions or plan: N/A  Progress: No Change.    ASAM Dimension Scale Ratings:  Dimension 1: 4 Client is incapacitated with severe signs and symptoms. Client displays severe withdrawal and is a danger to self or others.  Dimension 2: 1 Client tolerates and paula with physical discomfort and is able to get the services that the client needs.  Dimension 3: 2 Client has difficulty with impulse control and lacks coping skills. Client has thoughts of suicide or harm to others without means; however, the thoughts may interfere with participation in some treatment activities. Client has difficulty functioning in significant life areas. Client has moderate symptoms of emotional, behavioral, or cognitive problems. Client is able to participate in most treatment activities.  Dimension 4: 1 Client is motivated with active reinforcement, to explore treatment and strategies for change, but ambivalent about illness or need for change.  Dimension 5: 4 No awareness of the negative impact of mental health problems or substance abuse. No coping skills to arrest mental health or  addiction illnesses, or prevent relapse.  Dimension 6: 2 Client is engaged in structured, meaningful activity, but peers, family, significant other, and living environment are unsupportive, or there is criminal justice involvement by the client or among the client's peers, significant others, or in the client's living environment.

## 2023-01-17 NOTE — PHARMACY-ADMISSION MEDICATION HISTORY
Admission Medication History Completed by Pharmacy    See [facility] Admission Navigator for allergy information, preferred outpatient pharmacy, prior to admission medications and immunization status.     Medication History Sources:     Patient    Pharmacy fill history via [employer]    Changes made to PTA medication list (reason):    Added: ondansetron, valacyclovir, clotrimazole, triamcinolone (per pt, fill hx)    Deleted: Neosporin ointment (not taking per pt)    Changed:   o Quetiapine 25 mg hs prn, may repeat x1 --> 25 mg hs, may repeat x1 (per fill hx, pt)    Additional Information:    Valacyclovir prescribed 12/6/22 for HSV suppression (prescribed 90 day supply)    Clotrimazole and triamcinolone creams prescribed 12/6/22 for balanitis, pt reports still using    Metoprolol hasn't been filled since 10/13/22 but pt reports still taking and states he took a dose the day before he was admitted    Prior to Admission medications    Medication Sig Last Dose Taking? Auth Provider Long Term End Date   clotrimazole (LOTRIMIN) 1 % external cream Apply topically 2 times daily Past Week Yes Unknown, Entered By History     gabapentin (NEURONTIN) 300 MG capsule Take 1 capsule (300 mg) by mouth 3 times daily Past Week Yes Gautam Jimenez MD Yes    hydrOXYzine (ATARAX) 25 MG tablet Take 1 tablet (25 mg) by mouth every 4 hours as needed for anxiety Past Month Yes Gautam Jimenez MD     metoprolol succinate ER (TOPROL XL) 50 MG 24 hr tablet Take 1 tablet (50 mg) by mouth daily Past Week Yes Gautam Jimenez MD Yes    multivitamin w/minerals (THERA-VIT-M) tablet Take 1 tablet by mouth daily Past Week Yes Gautam Jimenez MD     ondansetron (ZOFRAN ODT) 8 MG ODT tab Take 8 mg by mouth every 12 hours as needed for nausea Past Month Yes Unknown, Entered By History     QUEtiapine (SEROQUEL) 25 MG tablet Take 1 tablet (25 mg) by mouth at bedtime as needed, may repeat once (for sleep as needed) Past Week Yes Barbara  MD Gautam Yes    thiamine (B-1) 100 MG tablet Take 1 tablet (100 mg) by mouth daily Past Week Yes Gautam Jimenez MD No    triamcinolone (KENALOG) 0.1 % external cream Apply topically 2 times daily Past Week Yes Unknown, Entered By History     valACYclovir (VALTREX) 500 MG tablet Take 500 mg by mouth daily Past Week Yes Unknown, Entered By History No    naloxone (NARCAN) 4 MG/0.1ML nasal spray Spray 1 spray (4 mg) into one nostril alternating nostrils as needed every 2-3 minutes until assistance arrives   Gautam Jimenez MD Yes    nicotine (NICODERM CQ) 21 MG/24HR 24 hr patch Place 1 patch onto the skin daily  Patient not taking: Reported on 1/17/2023 Not Taking  Gautam Jimenez MD     nicotine (NICORETTE) 2 MG gum Place 1 each (2 mg) inside cheek every hour as needed for other (nicotine withdrawal symptoms)  Patient not taking: Reported on 1/17/2023 Not Taking  Gautam Jimenez MD         Date completed: 01/17/23    Medication history completed by: Claudia Vázquez, [facility]

## 2023-01-17 NOTE — PLAN OF CARE
Problem: Alcohol Withdrawal  Goal: Alcohol Withdrawal Symptom Control  Outcome: Progressing   Goal Outcome Evaluation:    Plan of Care Reviewed With: patient      Pt has had a calm mood with a flat and blunted affect all shift despite rating his anxiety at 9/10, having had a poor night's sleep, and decreased appetite. He was isolated to his room, coming out only for meals, assessments, and meds. However, he denied SI, AVH, depression, and constipation. Pt started Lexapro today, and His MSSA scores were 12 and 8. He received Valium 10 mg twice. His Bp( 142/95, 132/94) and HR(108, 93. Pt has a PCO to wear scrubs only, no belongings, and no visitors, because staff found a restricted item (Tobacco product) discovered under his mattress. He claimed that he did not know that it was a restricted item. Pt desire is to return home and attend a home meeting which is one block from his home.

## 2023-01-17 NOTE — SIGNIFICANT EVENT
"During Environmental Checks at approximately 0800 writer discovered a tin of tobacco under pt's mattress. Event was reported to Charge RN (along with product). When Charge RN inquired to pt about the discovery he stated that he had it in his pocket upon admission and \"nobody asked.\"  "

## 2023-01-17 NOTE — PROGRESS NOTES
01/16/23 2125   Patient Belongings   Did you bring any home meds/supplements to the hospital?  No   Patient Belongings other (see comments)   Belongings Search Yes   Clothing Search Yes   Second Staff Leobardo   Comment See Notes     Large bin:  Tennis shoes, belt, jacket, E-cig, lighter,  life Alert fob,  hat,     Small bin:  Phone    Security Env # [id number]  $100. Cash    A               Admission:  I am responsible for any personal items that are not sent to the safe or pharmacy.  [facility] is not responsible for loss, theft or damage of any property in my possession.    Signature:  _________________________________ Date: _______  Time: _____                                              Staff Signature:  ____________________________ Date: ________  Time: _____      2nd Staff person, if patient is unable/unwilling to sign:    Signature: ________________________________ Date: ________  Time: _____     Discharge:  [facility] has returned all of my personal belongings:    Signature: _________________________________ Date: ________  Time: _____                                          Staff Signature:  ____________________________ Date: ________  Time: _____

## 2023-01-17 NOTE — DISCHARGE INSTRUCTIONS
Behavioral Discharge Planning and Instructions  THANK YOU FOR CHOOSING THE [facility]  3A  [phone]    Summary: You were admitted to Station 3A on 1/16/2023 for detoxification from alcohol.  A medical exam was performed that included lab work. You have met with a  and opted to pursue treatment at [facility].  Please take care and make your recovery a priority, Frank!    Recommendation: Enter and complete a residential treatment program such as [facility] and follow all continuing care recommendations.      Main Diagnosis: Per Dr. Gautam Jimenez MD  303.90 (F10.20) Alcohol Use Disorder Severe    Major Treatments, Procedures and Findings:  You were detoxed from alcohol with the Modified Selective Severity Protocol using Valium. You have met with a  to develop a treatment plan for discharge.  You have had labs drawn and a copy of those labs will be sent home with you.  Please bring your lab results with to your follow up doctor appointment.    Symptoms to Report:  If you experience more anxiety, confusion, sleeplessness, deep sadness or thoughts of suicide, notify your treatment team or notify your primary care physician. IF ANY OF THE SYMPTOMS YOU ARE EXPERIENCING ARE A MEDICAL EMERGENCY CALL 911 IMMEDIATELY.     If you or someone you know is struggling or in crisis, help is available.  Call or text 988 or chat  at [email or web address]    Lifestyle Adjustment: Adjust your lifestyle to get enough sleep, relaxation, exercise and  good nutrition. Continue to develop healthy coping skills to decrease stress and promote a sober living environment. Do not use alcohol, illegal drugs or addictive medications other than what is currently prescribed. AA, NA, and  Sponsor are excellent resources for support.     Primary Provider:    Disposition: [facility]        Facts about COVID19 at www.cdc.gov/COVID19 and www.[place].gov/covid19    Keeping hands clean is one of the  "most important steps we can take to avoid getting sick and spreading germs to others.  Please wash your hands frequently and lather with soap for at least 20 seconds!    Follow-up Appointment:    Patient declined to get Primary care follow up appointment at this point. He was encouraged to get appointment within 7days.   Information with provider's name and phone number were given.      Renetta Guthrie MD  [facility]    [address] [place] [address]    [phone]      Resources:     Resources for on line recovery meetings:  AA meetings can be found online; search for them at: http://aaBridgePort Networksintergroup.org/directory.php  AA meetings via ZOOM for [place] area can be found online at: https://aaTyRx PharmaneaSHIMAUMA Print System.org/find-a-meeting/holiday-closings/  NA meetings via [place] for [place] area can be found online at: https://sites.Stat Doctors.com/view/mnregionofnarcoticsanonymous/home?authuser=2    Www.[employer]  has online resources for meeting and recovery care including Podcast \"Let's Talk:Addiction & Recovery Podcasts    Www.mnrec1DayLatery.org     DISCHARGE RESOURCES:  -SMART Recovery - self management for addiction recovery:  www.smartrecovery.org    -Pathways ~ A Health Crisis Resource & Support Center: 416.319.3575.  -[facility] [phone]   -[facility] Behavioral Intake 122-231-2080 or 011-933-6082.  -Suicide Awareness Voices of Education (SAVE) (www.save.org): 309-600-YVZD (7224)  -National Suicide Prevention Line (www.mentalhealthmn.org): 887-267-QFLK (6472)  -National [place] on Mental Illness (www.[place].balta.org): 385.792.9096 or 645-396-5076.  -Obqy6agoy: text the word LIFE to 89180 for immediate support and crisis intervention  -Mental Health Consumer/Survivor Network of [place] (www.mhcsn.net): 790.815.1752 or 756-398-4273  -Mental Health Association of [place] (www.mentalhealth.org): 493.793.7533 or 150-559-2337     -Substance Abuse and Mental Health " Services (www.[facility].gov)  -Harm Reduction Coalition (www. Harmreduction.org)  -www.prescribetoprevent.org or http://prescribetoprevent.org/video  -Poison control 1-546.302.2610   **[place] Opioid Prevention Coalition: www.opioidcoalition.org    Sober Support Group Information:  AA/NA & Sponsor/Support  -Alcoholics Anonymous (www.alcoholics-anonymous.org): for local information 24 hours/day  -AA Intergroup service office in [place] (http://www.aastpa[place].org/) 781.308.1966  -AA Intergroup service office in [facility]: 334.224.3200. (http://www.aamin[place].org/)  -Narcotics Anonymous (www.naminn[place].org) (320) 990-7543   **Sober Fun Activities: www.soberGetGiftedactivities.TurnTide/[facility]//[facility] Recovery Connection ([facility])  [facility] connects people seeking recovery to resources that help foster and sustain long-term recovery.  Whether you are seeking resources for treatment, transportation, housing, job training, education, health care or other pathways to recovery, [facility] is a great place to start.    Phone: (579) 636-5339.  www.[place]Endorse.LawPivot (Great listing of all types of recovery and non-recovery related resources)      General Medication Instructions:   See your medication sheet(s) for instructions.   Take all medicines as directed.  Make no changes unless your doctor suggests them.   Go to all your doctor visits.  Be sure to have all your required lab tests. This way, your medicines can be refilled on time.  Do not use any drugs not prescribed by your provider.  AA/NA and Sponsors are excellent resources for support  Avoid alcohol.    Any follow up concerns:  Nursing questions call the [facility] [phone]  Medical Record call [phone]  Outpatient Behavioral Intake call [phone]  LP+ Wait List/Bed Availability call [phone]    The entire treatment team has appreciated the opportunity to work with you Frank.  We wish you the best in the future and with your  lifelong recovery goals. Please bring this discharge folder with you to all follow up appointments.  It contains your lab results, diagnosis, medication list and discharge recommendations.    THANK YOU FOR CHOOSING THE [facility]

## 2023-01-17 NOTE — PROGRESS NOTES
"Pt approached writer at 3:20 PM, reported he had changed his mind following conversation with CM at 2:30 and now wants to attend [facility]. Pt shown paperwork to complete for CD assessment. Pt reported his writer is requesting an \"ADA form\" so that his absence can be excused while his in treatment. Pt provided copy of this form to writer, Wednesday CM to f/u.     Jeanie Hare, LPCC, LADC   "

## 2023-01-17 NOTE — PROGRESS NOTES
"  Problem: Behavioral Health Plan of Care  Goal: Optimal Comfort and Wellbeing  Outcome: Ongoing, Progressing    Behavioral  Pt appeared sleeping comfortably overnight. No behavioral concerns noted.     Medical  Pt continues in alcohol withdrawal with Valium. Pt needs to have BZO's withdrawal address by MD today.      MSSA 9 at midnight. Valium 10 mg given @ 00:25 am.   B/P: 104/59, T: 97.6, P: 113, R: 16    MSSA 6 at 2 am. Phenobartitol 64.8 mg given at 2:15 am as one time dose. Given late, none in pyxis. This medication was given as seizure prevention. On pm shift pt was tremulous and stated \"I feel like I'm going to have a seizure.\"     MSSA 10 @ 5 am. Valium 10 mg given @ 5:01 am. B/P: 118/74, T: 97.6, P: 93, R: 17     Pt has had a total of 55 mg of Valium since 22:00 pm on 1/16/23.     No new medical concerns noted.   "

## 2023-01-17 NOTE — PROGRESS NOTES
Spoke with pt about discharge planning, following up on conversation he had with 3A CM staff regarding deciding between LP or returning home.  Pt reports he wants to return home at this time and will attend his home meeting which he reports is 1 block from his home.  Inquired if pt was interested in OP Tx or any additional resources, pt declined.  This writer relayed to pt that if he changes his mind or has any questions to speak with CM staff

## 2023-01-17 NOTE — CONSULTS
[facility]  Consult Note - Hospitalist Service  Date of Admission:  1/16/2023  Consult Requested by:Jose Johnston MD  Reason for Consult: Alcohol detox    Assessment & Plan   Frank Arguello is a 37 year old male admitted on 1/16/2023. He has a past medical history of severe alcohol use disorder, alcohol detox with seizures/DTs, adjustment disorder with anxious mood, depression, HTN, herpes simplex infection of the penis, polysubstance abuse (benzos, narcotics, Adderall), overdose on opiates,  chronic pancreatitis, pancreatic pseudocyst, steatosis of the liver, tobacco dependence, and GERD who presents to the ED seeking detox.    Medicine will continue to follow to trend lipase and abdominal pain.     Acute Alcohol Withdrawal  Alcohol Use Disorder  Polysubstance Abuse Disorder (benzos, narcotics, Adderall)  Last drink on 3 hours PTA on 1/16. Drinks 750 mL-1L  of vodka liquour daily.History of seizures or DT's. Blood alcohol level of 0.39. Utox negative. Alk phos WNL.Total bili WNL. AST: ALT of 150:83, in 2:1 pattern with alcohol use.   - Management per Psychiatry  - No need for BMP/hepatic panel unless clinically indicated   - Agree with [facility] protocol  - Agree with MVI, folate, thiamine     HTN  Elevated BP in setting of withdrawl  Mildly elevated in 130-140s/90s.   - Continue metoprolol 50 mg daily, hold parameters placed  - Always recheck BP if high or low and correlate with clinical situation  - Treat pain, anxiety, and any withdrawal s/s if present  - Trend  - Notify provider if SBP >170 or DBP >110 after careful reassessment, treatment of pain/anxiety/withdrawal/home PTA med adminstration     Chronic pancreatitis, alcohol induced  Pancreatic cyst  Mild abdominal aching and tenderness profuse through entire abdomen. Lipase 57 on 1/16. No vomiting, but some nausea  -Trend lipase, abdominal pain, inability to take PO.   - Consider CT noncon if worsening abdominal  "pain     Anxiety  Depression  - Per psychiatry     Herpes simplex of penis: No current flare.  GERD: Continue 20 mg ompramazole PTA  Tobacco use disorder: Nicotine patch + lozenges     The patient's care was discussed with the Bedside Nurse, Patient and Primary team.    Clinically Significant Risk Factors Present on Admission                               FAY [facility]  Hospitalist Service  Securely message with [employer] (more info)  Text page via [facility] Paging/Directory   ______________________________________________________________________    Chief Complaint   Acute alcohol use disorder   Acu    History is obtained from the patient    History of Present Illness   Frank Arguello is a 37 year old male who of severe alcohol use disorder, alcohol detox with seizures/DTs, adjustment disorder with anxious mood, depression, HTN, herpes simplex infection of the penis, polysubstance abuse (benzos, narcotics, Adderall), overdose on opiates,  chronic pancreatitis, pancreatic pseudocyst, steatosis of the liver, tobacco dependence, and GERD who presents to the ED seeking detox. Pt reports drinking ~750mL-1L of vodka, last drink for   3 hours prior to arrival.  He has a history of withdrawal seizures and DTs with last seizure was over a year ago.  He adds that he uses THC and benzos (5 mg every 2 hours), last use less than 48 hours ago.  He feels shaky. Mildly elevated BP ~120, but stable BP.  IV was established, labs drawn sent reviewed document epic remarkable for serum alcohol is 0.39 and a transaminitis with AST ALT of 150/83 otherwise stable CBC electrolytes.  Serum alcohol elevated 0.39, influenza and COVID swabs negative.Pt was given a banana bag in the ED and started on MSSA protocol.  He was given Zyprexa 5 mg ODT as an antiemetic and for his epigastric discomfort.  Patient had a lipase and troponin added on his regimen and WNL.      Upon exam, pt feeling nauseated and \"tight and sore all over.\" He felt that " his abdomen felt slightly like a watermelon was abdomen and trying to get out.  He feels guilty about going on a patten last week. Denies any headaches, fevers, dysphagia, SOB, chest pain, N/V,  dysuria, back pain swelling, or leg edema.    Past Medical History    Past Medical History:   Diagnosis Date     Anxiety      Depressive disorder      Hypertension      Pancreatitis      Pseudocyst of pancreas      Substance abuse (H)        Past Surgical History   Past Surgical History:   Procedure Laterality Date     ABDOMEN SURGERY       ENDOSCOPIC RETROGRADE CHOLANGIOPANCREATOGRAM  4/9/2014    Procedure: ENDOSCOPIC RETROGRADE CHOLANGIOPANCREATOGRAM;   Endoscopic Ultrasound with Fine needle aspiration, EsophagoGastroDuodenoscopy with stent placement into pseudeocyst, balloon dilation.  ;  Surgeon: Jagjit Dow MD;  Location: [facility] OR     ESOPHAGOSCOPY, GASTROSCOPY, DUODENOSCOPY (EGD), COMBINED  4/9/2014    Procedure: COMBINED ENDOSCOPIC ULTRASOUND, ESOPHAGOSCOPY, GASTROSCOPY, DUODENOSCOPY (EGD), FINE NEEDLE ASPIRATE/BIOPSY;;  Surgeon: Jagjit Dow MD;  Location: [facility] OR     ESOPHAGOSCOPY, GASTROSCOPY, DUODENOSCOPY (EGD), COMBINED  6/24/2014    Procedure: COMBINED ESOPHAGOSCOPY, GASTROSCOPY, DUODENOSCOPY (EGD);  Surgeon: Jagjit Dow MD;  Location: [facility] OR       Medications   I have reviewed this patient's current medications  Medications Prior to Admission   Medication Sig Dispense Refill Last Dose     gabapentin (NEURONTIN) 300 MG capsule Take 1 capsule (300 mg) by mouth 3 times daily 90 capsule 0 Past Week     hydrOXYzine (ATARAX) 25 MG tablet Take 1 tablet (25 mg) by mouth every 4 hours as needed for anxiety 30 tablet 0      metoprolol succinate ER (TOPROL XL) 50 MG 24 hr tablet Take 1 tablet (50 mg) by mouth daily 30 tablet 0      multivitamin w/minerals (THERA-VIT-M) tablet Take 1 tablet by mouth daily 30 tablet 0      naloxone (NARCAN) 4 MG/0.1ML nasal spray Spray 1 spray (4 mg) into one nostril  alternating nostrils as needed every 2-3 minutes until assistance arrives 0.2 mL 1      neomycin-bacitracin-polymyxin (NEOSPORIN) 5-400-5000 ointment Apply topically 4 times daily 15 g 0      nicotine (NICODERM CQ) 21 MG/24HR 24 hr patch Place 1 patch onto the skin daily 30 patch 0      nicotine (NICORETTE) 2 MG gum Place 1 each (2 mg) inside cheek every hour as needed for other (nicotine withdrawal symptoms) 30 each 0      QUEtiapine (SEROQUEL) 25 MG tablet Take 1 tablet (25 mg) by mouth at bedtime as needed, may repeat once (for sleep as needed) 30 tablet 0      thiamine (B-1) 100 MG tablet Take 1 tablet (100 mg) by mouth daily 30 tablet 0           Review of Systems    The 10 point Review of Systems is negative other than noted in the HPI or here.     Social History   I have reviewed this patient's social history and updated it with pertinent information if needed.  Social History     Tobacco Use     Smoking status: Some Days     Packs/day: 0.50     Types: Cigarettes   Substance Use Topics     Alcohol use: Yes     Comment: 1 pint and 6 beers per day     Drug use: Not Currently       Allergies   Allergies   Allergen Reactions     Trazodone Other (See Comments)     erection        Physical Exam   Vital Signs: Temp: 97.6  F (36.4  C) Temp src: Temporal BP: (!) 142/95 Pulse: 108   Resp: 16 SpO2: 95 % O2 Device: None (Room air)    Weight: 198 lbs 0 oz    Constitutional: awake, alert, cooperative, no apparent distress, and appears stated age  Respiratory: No increased work of breathing, good air exchange, clear to auscultation bilaterally, no crackles or wheezing  Cardiovascular: Normal apical impulse, regular rate and rhythm, normal S1 and S2, no S3 or S4, and no murmur noted  GI: No scars, normal bowel sounds, soft, mildly tender throughout, mainly in LLQ  Skin: no bruising or bleeding, normal skin color, texture, turgor and no redness, warmth, or swelling  Musculoskeletal: There is no redness, warmth, or swelling of  the joints.  Full range of motion noted.  Motor strength is 5 out of 5 all extremities bilaterally.  Tone is normal.  Neuropsychiatric: General: normal, calm and normal eye contact  Level of consciousness: alert / normal  Affect: normal  Orientation: oriented to self, place, time and situation  Memory and insight: normal, memory for past and recent events intact and thought process normal    Medical Decision Making       40 MINUTES SPENT BY ME on the date of service doing chart review, history, exam, documentation & further activities per the note.      Data     I have personally reviewed the following data over the past 24 hrs:    9.6  \   15.4   / 141 (L)     138 101 12 /  97   3.7 20 0.71 \       ALT: 83 (H) AST: 150 (H) AP: 75 TBILI: 0.5   ALB: 4.5 TOT PROTEIN: 8.6 LIPASE: 57 (L)       Imaging results reviewed over the past 24 hrs:   No results found for this or any previous visit (from the past 24 hour(s)).

## 2023-01-17 NOTE — PROGRESS NOTES
Met with pt briefly to discuss discharge planning. Pt reports a plan to either go [facility] to [facility], or return home. Pt unsure at this time between these two plans. Would like to talk later today when feeling better. Will check in with pt again in afternoon.     Jeanie Hare, [facility], LADC

## 2023-01-17 NOTE — PLAN OF CARE
"AURORA HECK Jos is a 37 year old year old male with a chief complaint of alcohol problem      S = Situation:   Patient is a voluntary admission seeking alcohol detox      B  = Background:   Patient admitted from [facility] seeking alcohol detox and benzodiazepine detox. Patient reports drinking 0.75 L of Vodka daily for at least 2  or 3 weeks. Has also been taking Valium 5 mg every 2 hours, unknown amount of time. He stopped taking them 48 hours ago per his report. BEATRIS initially .416 in the ER. Urine drug screen negative, COVID negative. Patient reports a history of delirium tremens and alcohol withdrawal seizure. Patient reports he had a seizure 2 weeks ago. Patient has been to detox at this facility in the past, and has also been to several CD treatment programs.  Past Medical History:   Diagnosis Date    Anxiety     Depressive disorder     Hypertension     Pancreatitis     Pseudocyst of pancreas     Substance abuse (H)         A  =  Assessment:   Patient affect flat, blunted. Mood is depressed. Patient denies SI, SIB, HI, or hallucinations. Patient reports he doesn't have any identified stressors for resuming alcohol and substance use. He has some trauma that is affecting his ability to stay sober. Patient is interested in attending [facility] after detox.  Patient MSSA score 12. /87   Pulse 112   Temp 99.1  F (37.3  C) (Oral)   Resp 16   Ht 1.905 m (6' 3\")   Wt 89.8 kg (198 lb)   SpO2 97%   BMI 24.75 kg/m       R =   Request or Recommendation:   Patient is on MSSA monitoring with Valium for alcohol withdrawal. Is on withdrawal and seizure precautions. Patient to be assessed in am by psychiatrist, internal medicine, and case management. Patient to be placed on benzodiazepine withdrawal protocol in am if determined by psychiatrist to be needed.               "

## 2023-01-18 NOTE — PROGRESS NOTES
Cross cover note:    Lipase   Date Value Ref Range Status   01/17/2023 45 (L) 73 - 393 U/L Final   01/16/2023 57 (L) 73 - 393 U/L Final   06/10/2021 20 (L) 73 - 393 U/L Final   05/29/2018 471 (H) 73 - 393 U/L Final   02/21/2017 244 73 - 393 U/L Final     Lipase continues to trend down.     FAY Schofield CNP  Internal Medicine [facility] Hospitalist  Page job code [phone] (3B), [phone] (3A), or [phone] ([facility] and 4A)  Text paging via [employer] is appreciated  January 17, 2023

## 2023-01-18 NOTE — PROGRESS NOTES
[facility], [place]   Psychiatric Progress Note        Interim history   This is a 37 year old male with Alcohol use disorder severe  Alcohol withdrawal severe.Pt seen in rounds.   The patient's care was discussed with the treatment team during the daily team meeting and/or staff's chart notes were reviewed.  Staff report patient has been visible in the milieu,  no acute eventsovernight.     Patient's mood is better  Energy Level:improving   Sleep:No concerns, sleeps well through night  Appetite:improving , improving  motivation interest   Denied any Suicidal/homicidal ideation/plan intent.  Denied any psychosis  No prior suicde attempts  No access to gun  Pt is in alcohol withdrawal still being monitered every 4 hrs for it,   Pt mssa score are monitered  Tolerating meds and has no side effects.              Medications:     Current Facility-Administered Medications   Medication     acetaminophen (TYLENOL) tablet 650 mg     alum & mag hydroxide-simethicone (MAALOX) suspension 30 mL     diazepam (VALIUM) tablet 5-20 mg     [START ON 1/19/2023] escitalopram (LEXAPRO) tablet 10 mg     gabapentin (NEURONTIN) capsule 400 mg     hydrOXYzine (ATARAX) tablet 25 mg     melatonin tablet 3 mg     metoprolol succinate ER (TOPROL XL) 24 hr tablet 50 mg     multivitamin w/minerals (THERA-VIT-M) tablet 1 tablet     nicotine (COMMIT) lozenge 2 mg     nicotine (NICODERM CQ) 21 MG/24HR 24 hr patch 1 patch    And     nicotine Patch in Place     OLANZapine (zyPREXA) tablet 10 mg    Or     OLANZapine (zyPREXA) injection 10 mg     polyethylene glycol (MIRALAX) Packet 17 g     QUEtiapine (SEROquel) tablet 25-50 mg     thiamine (B-1) tablet 100 mg             Allergies:     Allergies   Allergen Reactions     Trazodone Other (See Comments)     erection            Psychiatric Examination:   Blood pressure (!) 144/88, pulse 86, temperature 97.4  F (36.3  C), temperature source Temporal, resp. rate 16, height 1.905  "m (6' 3\"), weight 89.8 kg (198 lb), SpO2 96 %.  Weight is 198 lbs 0 oz  Body mass index is 24.75 kg/m .    Appearance:  awake, alert and adequately groomed  Attitude:  cooperative  Eye Contact:  good  Mood:  better  Affect:  appropriate and in normal range and mood congruent  Speech:  clear, coherent rate /rhythm are good  Psychomotor Behavior:  no evidence of tardive dyskinesia, dystonia, or tics and intact station, gait and muscle tone  Throught Process:  logical  Associations:  no loose associations  Thought Content:  no evidence of suicidal ideation or homicidal ideation, no evidence of psychotic thought, no auditory hallucinations present and no visual hallucinations present  Insight:  fair  Judgement:  intact  Oriented to:  time, person, and place  Attention Span and Concentration:  intact  Recent and Remote Memory:  intact  Language fund of knowledge are adequate         Labs:     Recent Results (from the past 24 hour(s))   Lipase    Collection Time: 01/17/23  4:00 PM   Result Value Ref Range    Lipase 45 (L) 73 - 393 U/L         DX      PLAN   Alcohol intoxication/withdrawal, presently is on MSSA protocol with Valium. Continue the same MSSA protocol as ordered. Continue thiamine 100 mg p.o. daily, M.V.I. one p.o. daily and folate 1 mg p.o. Daily  Will continue mssa protocal to detox off alcohol on valium,  Pt is c/o of termor , agitation poor sleep and poor appetite, he has sweats, feels shakey  Patient received 95 mg of diazepam since admission    MSSA    Eating Disturbances: ate and enjoyed all of it or not applicable  Tremor: 0 - no tremor  Sleep Disturbance: slept through the night or not applicable  Clouding of Sensorium: no evidence  Hallucinations: 0 - none  Quality of Contact: 0 - awareness of examiner and people around him/her  Agitation: 0 - normal activity  Paroxysmal Sweats: 2  Temperature: 99.5 or below  Pulse: 0 - 69 or below  Total MSSA Score: 3    We will increase Lexapro to 10 mg  We will " increase gabapentin to 400 mg 3 times daily  We will increase Seroquel to 25 to 50 at bedtime nightly as needed  Laboratory/Imaging: reviewed with patient   Consults: internal medicine consult reviewed  Patient will be treated in therapeutic milieu with appropriate individual and group therapies as described.  PDMP CHECKED     Supportive psychotheraoy provided, dayna talked about recovery enviroment, relapse prevention, triggers to use.  Discussed with patient many issues of addiction,triggers, relapse, and establishing a solid recovery program.  Asked pt to be med complinat   Medical diagnoses to be addressed this admission:    Plan:    Assessment & Plan     Frank Arguello is a 37 year old male admitted on 1/16/2023. He has a past medical history of severe alcohol use disorder, alcohol detox with seizures/DTs, adjustment disorder with anxious mood, depression, HTN, herpes simplex infection of the penis, polysubstance abuse (benzos, narcotics, Adderall), overdose on opiates,  chronic pancreatitis, pancreatic pseudocyst, steatosis of the liver, tobacco dependence, and GERD who presents to the ED seeking detox.     Medicine will continue to follow to trend lipase and abdominal pain.      Acute Alcohol Withdrawal  Alcohol Use Disorder  Polysubstance Abuse Disorder (benzos, narcotics, Adderall)  Last drink on 3 hours PTA on 1/16. Drinks 750 mL-1L  of vodka liquour daily.History of seizures or DT's. Blood alcohol level of 0.39. Utox negative. Alk phos WNL.Total bili WNL. AST: ALT of 150:83, in 2:1 pattern with alcohol use.   - Management per Psychiatry  - No need for BMP/hepatic panel unless clinically indicated   - [facility]  - Agree with MVI, folate, thiamine      HTN  Elevated BP in setting of withdrawl  Mildly elevated in 130-140s/90s.   - Continue metoprolol 50 mg daily, hold parameters placed  - Always recheck BP if high or low and correlate with clinical situation  - Treat pain, anxiety, and any  withdrawal s/s if present  - Trend  - Notify provider if SBP >170 or DBP >110 after careful reassessment, treatment of pain/anxiety/withdrawal/home PTA med adminstration     Chronic pancreatitis, alcohol induced  Pancreatic cyst  Mild abdominal aching and tenderness profuse through entire abdomen. Lipase 57 on 1/16. No vomiting, but some nausea  -Trend lipase, abdominal pain, inability to take PO.   - Consider CT noncon if worsening abdominal pain     Anxiety  Depression  - Per psychiatry     Herpes simplex of penis: No current flare.  GERD: Continue 20 mg ompramazole PTA  Tobacco use disorder: Nicotine patch + lozenges        The patient's care was discussed with the Bedside Nurse, Patient and Primary team.  Legal Status: voluntary    Safety Assessment:   Checks:  15 min  Precautions: withdrawal precautions  Pt has not required locked seclusion or restraints in the past 24 hours to maintain safety, please refer to RN documentation for further details.  Discussed with patient many issues of addiction,triggers, relapse, and establishing a solid recovery program.  Able to give informed consent:  YES   Discussed Risks/Benefits/Side Effects/Alternatives: YES    After discussion of the indications, risks, benefits, alternatives and consequences of no treatment, the patient elects to complete detox and do treatment

## 2023-01-18 NOTE — PLAN OF CARE
"  Problem: Alcohol Withdrawal  Goal: Alcohol Withdrawal Symptom Control  Outcome: Progressing   Goal Outcome Evaluation:    Plan of Care Reviewed With: patient      The patient stayed in the lounge briefly after lunch; otherwise, he seemed upset about something while preferring to remain in his room this shift. The Pt initially refused his meds but took them at the bedside. When the author asked him to come to the nurses' station for his meds, he replied, \"It is vitamins and gabapentin. I don't need them since I don't get anything I ask for from you guys.\" He had a calm mood with a flat affect. Still, he denied SI, AVHs, anxiety, depression, and all other mental health-related SxS. He is eating, sleeping, and using the restroom without difficulty. His MSSA score was seven, and has received no Valium since 1/17 in 2026. His BP readings were 146/108, 144/88, and 142/96, but he has not met the parameter (call if BP exceeds 170/110) to notify the provider. IM signed off RE: abdominal pain with normal lipase. The Pt will F/U with PCP.        "

## 2023-01-18 NOTE — PLAN OF CARE
Problem: Alcohol Withdrawal  Goal: Alcohol Withdrawal Symptom Control  Outcome: Progressing   Goal Outcome Evaluation:    Plan of Care Reviewed With: patient  Patient visible in the milieu with flat and blunted affect. Patient engaged with peers and attended group. Endorsed high Anxiety and depression. Denied HI/SI/SIB and contracted for safety. Patient reported having 75% of his dinner and drinking adequate fluid intake. Patient scored MSSA of 11 and 9. Prn 10 mg x2 valium was given. Prn Seroquel and trazodone for sleep per patient requested. Staff will continue to monitor.

## 2023-01-18 NOTE — PROGRESS NOTES
"  Unit 3A    UNIVERSAL ADULT DIAGNOSTIC ASSESSMENT - Substance Use Disorder    Provider Name and Credentials: Oriana Simms MS, [facility]     PATIENT'S NAME: Frank Arguello  PREFERRED NAME: Frank  PRONOUNS: he/him/his     MRN: [id number]  : 1985   Last 4 SSN: [id number]  ACCT. NUMBER:  [id number]  DATE OF SERVICE: 2023   START TIME:   END TIME: 09.  PREFERRED PHONE: [phone]   EMAIL: [email or web address]     May we leave a program related message: Yes  SERVICE MODALITY:  In-person      Identifying Information:  Patient is a 37 year old,  male who was referred for an assessment by self. The pronoun use throughout this assessment reflects the patient's chosen pronoun. Patient attended the session alone.     Chief Complaint:   The reason for seeking services at this time is: \"Detox and treatment for alcohol\"  The problem(s) began at age 15. Patient has attempted to resolve these concerns in the past through treatment.  Patient is in active withdrawal, but is currently admitted to [facility] for medical detoxification and withdrawal monitoring and is not an imminent safety risk to self or others, and may proceed with the assessment interview    Social/Family History:  Patient reported he grew up in [place], [place]. Patient was raised by his parents and grandparents. Patient reported that his childhood was OK.  Patient describes current relationships with family of origin as positive.      The patient describes his cultural background as white.  Cultural influences and impact on patient's life structure, values, norms, and healthcare: None identified.  Contextual influences on patient's health include: Contextual Factors: Individual Factors ALAN.  Patient identified his preferred language to be English. Patient reported he does not need the assistance of an  or other support involved in therapy.     Patient reported had no significant delays in developmental tasks.  " "Patient's highest education level was high school graduate. Patient identified the following learning problems: none reported.  Patient reports he is able to understand written materials.    Patient's current relationship status is partnered / significant other for 1 1/2 years.   Patient identified his sexual orientation as straight.  Patient reported having zero child(chris).     Patient's current living/housing situation involves homelessness. Pt uses his Mom's addresss as a mailing address. He does live with his mom or a friend who is sober.  Patient lives with his dog and he reports that housing is stable. Patient identified partner, parents, friends and co-worker as part of his support system.  Patient identified the quality of these relationships as stable and meaningful.      Patient reports he is not involved in community of elli[place] activities. Patients reports spirituality impacts his recovery in the following ways:  \"It doesn't\".     Patient reports engaging in the following recreational/leisure activities: Hunting, fishing, going to meetings. Patient reports engaging in the following recreation/leisure activities while using: none. Patient reports the following people are supportive of recovery: Parents, Partner, friends, co-workers.  Patient is currently unemployed.  Patient reports his income is obtained through parents and family.  Patient does identify finances as a current stressor. Cultural and socioeconomic factors do not affect the patient's access to services.    Patient denies substance related arrests or legal issues.  Patient denies being on probation / parole / under the jurisdiction of the court.    Patient's Strengths and Limitations:  Patient identified the following strengths or resources that will help him succeed in treatment: Endurance and will ppower. Things that may interfere with the patient's success in treatment include: none identified.     Assessments:  The following assessments " were completed by patient for this visit:  PHQ9:   PHQ-9 SCORE 11/15/2016 11/25/2016 6/10/2021 1/18/2023   PHQ-9 Total Score 9 21 8 18     GAD7:   ROCHELLE-7 SCORE 11/15/2016 6/10/2021 1/18/2023   Total Score 14 3 12     PROMIS 10-Global Health (all questions and answers displayed):   PROMIS 10 1/18/2023   In general, would you say your health is: 2   In general, would you say your quality of life is: 2   In general, how would you rate your physical health? 2   In general, how would you rate your mental health, including your mood and your ability to think? 2   In general, how would you rate your satisfaction with your social activities and relationships? 3   In general, please rate how well you carry out your usual social activities and roles. (This includes activities at home, at work and in your community, and responsibilities as a parent, child, spouse, employee, friend, etc.) 3   To what extent are you able to carry out your everyday physical activities such as walking, climbing stairs, carrying groceries, or moving a chair? 4   In the past 7 days, how often have you been bothered by emotional problems such as feeling anxious, depressed, or irritable? 3   In the past 7 days, how would you rate your fatigue on average? 4   In the past 7 days, how would you rate your pain on average, where 0 means no pain, and 10 means worst imaginable pain? 6   Global Mental Health Score 10   Global Physical Health Score 11   PROMIS TOTAL - SUBSCORES 21   Some recent data might be hidden     [place] Suicide Severity Rating Scale (Short Version)  [place] Suicide Severity Rating (Short Version) 10/11/2022 10/12/2022 10/12/2022 12/1/2022 1/16/2023   Over the past 2 weeks have you felt down, depressed, or hopeless? no - - no no   Over the past 2 weeks have you had thoughts of killing yourself? no - - no no   Have you ever attempted to kill yourself? no - - no no   Q1 Wished to be Dead (Past Month) - no no - no   Q2 Suicidal Thoughts  (Past Month) - no no - no   Q3 Suicidal Thought Method - no no - no   Q4 Suicidal Intent without Specific Plan - no no - no   Q5 Suicide Intent with Specific Plan - no no - no   Q6 Suicide Behavior (Lifetime) - no no - no   Level of Risk per Screen - low risk low risk - low risk   Required Interventions - Room made safe;Patient searched;Belongings removed - - -     GAIN-sliding scale:  When was the last time that you had significant problems... 2023   with feeling very trapped, lonely, sad, blue, depressed or hopeless about the future? 2 to 12 months ago   with sleep trouble, such as bad dreams, sleeping restlessly, or falling asleep during the day? 2 to 12 months ago   with feeling very anxious, nervous, tense, scared, panicked or like something bad was going to happen? 2 to 12 months ago   with becoming very distressed & upset when something reminded you of the past? 1+ years ago   with thinking about ending your life or committing suicide? Never      When was the last time that you did the following things 2 or more times? 2023   Lied or conned to get things you wanted or to avoid having to do something? 2 to 12 months ago   Had a hard time paying attention at school, work or home? 2 to 12 months ago   Had a hard time listening to instructions at school, work or home? 2 to 12 months ago   Were a bully or threatened other people? Never   Started physical fights with other people? Never       Personal and Family Medical History:   Patient did report a family history of mental health concerns.  Patient reports the following family history:   Family History   Problem Relation Age of Onset     Alcohol/Drug Paternal Grandfather      Alcohol/Drug Father      Substance Abuse Father      Hypertension Mother      Other - See Comments Brother          of heroin overdose     Substance Abuse Brother      Hyperlipidemia Maternal Grandmother      Diabetes No family hx of      Cancer No family hx of       Unknown/Adopted No family hx of      Depression No family hx of      Anxiety Disorder No family hx of      Schizophrenia No family hx of      Bipolar Disorder No family hx of      Suicide No family hx of      Dementia No family hx of      Keeling Disease No family hx of      Parkinsonism No family hx of      Autism Spectrum Disorder No family hx of      Intellectual Disability (Mental Retardation) No family hx of      Mental Illness No family hx of      Suicidality Brother         Patient reported the following previous mental health diagnoses: None.  Patient reports his primary mental health symptoms include: None and these do not impact his ability to function.   Patient has received mental health services in the past: Therapy.  Psychiatric Hospitalizations: None.  Patient denies a history of civil commitment.  Current mental health services/providers include:  None.    Patient has had a physical exam to rule out medical causes for current symptoms.  Date of last physical exam was within the past year. Client was encouraged to follow up with PCP if symptoms were to develop. The patient does not have a Primary Care Provider and was encouraged to establish care with a PCP.. Patient reports no current medical concerns.  Patient denies any issues with pain.  There are not significant appetite / nutritional concerns / weight changes. Patient does not report a history of an eating disorder. Patient does not report a history of head injury / trauma / cognitive impairment.      Patient reports current meds as:   Outpatient Medications Marked as Taking for the 1/16/23 encounter (Hospital Encounter)   Medication Sig     clotrimazole (LOTRIMIN) 1 % external cream Apply topically 2 times daily     gabapentin (NEURONTIN) 300 MG capsule Take 1 capsule (300 mg) by mouth 3 times daily     hydrOXYzine (ATARAX) 25 MG tablet Take 1 tablet (25 mg) by mouth every 4 hours as needed for anxiety     metoprolol succinate ER (TOPROL XL)  50 MG 24 hr tablet Take 1 tablet (50 mg) by mouth daily     multivitamin w/minerals (THERA-VIT-M) tablet Take 1 tablet by mouth daily     ondansetron (ZOFRAN ODT) 8 MG ODT tab Take 8 mg by mouth every 12 hours as needed for nausea     QUEtiapine (SEROQUEL) 25 MG tablet Take 1 tablet (25 mg) by mouth at bedtime as needed, may repeat once (for sleep as needed) (Patient taking differently: Take 25 mg by mouth At Bedtime May repeat x1 if needed for sleep)     thiamine (B-1) 100 MG tablet Take 1 tablet (100 mg) by mouth daily     triamcinolone (KENALOG) 0.1 % external cream Apply topically 2 times daily     valACYclovir (VALTREX) 500 MG tablet Take 500 mg by mouth daily       Medication Adherence:  Patient reports taking prescribed medications as prescribed.    Patient Allergies:    Allergies   Allergen Reactions     Trazodone Other (See Comments)     erection       Medical History:    Past Medical History:   Diagnosis Date     Anxiety      Depressive disorder      Hypertension      Pancreatitis      Pseudocyst of pancreas      Substance abuse (H)        Substance Use:  Patient reported the following biological family members or relatives with chemical health issues:  Father.. Patient has received substance use disorder and/or gambling treatment in the past.  Patient reports the following dates and locations of treatment services:  [facility] years ago, and methadone clinic fo individual sessions. Patient has been to detox. Patient is not currently receiving any chemical dependency treatment. Patient reports they have attended the following support groups: AA and NA in the past.        Substance Age of first use Pattern and duration of use (include amounts and frequency) Date of last use     Withdrawal potential Route of administration   Has used Alcohol 15 Became a problem at 27. Reports he had financial stress stressed with his job and he relapsed.  He reports has been drinking for 1 week and he has been drinking 1  pint to 750 mL of hard liquor. 1/16/23 Yes oral   Has not used Marijuana            Has not used Amphetamines          Has not used Cocaine/ crack           Has not used Hallucinogens        Has not used Inhalants        Has used Heroin 23 Patient has a prior history to opiates as below last use of opiates was when he overdosed on methadone  On 9/28 9/30 patient had overdosed on methadone.  He was given Narcan and discharged after being hospitalized. Years ago No smoked, snorted and IV - injected   Has not used Other Opiates        Has used Benzodiazepine   23 Patient reports he has been getting Valium off the streets   He is taking 5 mg tablets about 4 of them for a week. 1/16/23 Yes oral   Has not used Barbiturates        Has not used Over the counter meds.        Has not used Caffeine        Has used Nicotine  8 1/2 ppd 1/16/23 Yes smoked   Has not used other substances not listed above:  Identify:              Patient reported the following problems as a result of their substance use: academic, DUI, family problems, financial problems, relationship problems and sexual issues.  Patient is concerned about substance use.     Patient reports experiencing the following withdrawal symptoms within the past 12 months: sweating, shaky/jittery/tremors, unable to sleep, agitation, headache, fatigue, sad/depressed feeling, muscle aches, vivid/unpleasant dreams, irritability, sensitivity to noise, high blood pressure, nausea/vomiting, dizziness, diarrhea, diminished appetite, hallucinations, unable to eat, fever, confused/disrupted speech and anxiety/worry and the following within the past 30 days: same  .   Patients reports urges to use Alcohol.  Patient reports he has used more Alcohol than intended and over a longer period of time than intended. Patient reports he has had unsuccessful attempts to cut down or control use of Alcohol.  Patient reports longest period of abstinence was 1 3/4 year and return to use was due to  trauma. Patient reports he has needed to use more Alcohol to achieve the same effect.  Patient does  report diminished effect with use of same amount of Alcohol.     Patient does  report a great deal of time is spent in activities necessary to obtain, use, or recover from Alcohol effects.  Patient does  report important social, occupational, or recreational activities are given up or reduced because of Alcohol use.  Alcohol use is continued despite knowledge of having a persistent or recurrent physical or psychological problem that is likely to have caused or exacerbated by use.  Patient reports the following problem behaviors while under the influence of substances: DUI.     Patient reports his recovery goals are to be sober and happy.     Patient reports substance use has not impacted his ability to function in a school setting. Patient reports substance use has impacted his ability to function in a work setting.  Patients demographics and history impact his recovery in the following ways:  Pt has a long hx of use and ALAN.  Pt has been to treatment in the past.  Pt reports cravings and an inability to stop using outside of a structured environment.     Patient does not have a history of gambling concerns and/or treatment. Patient does not have other addictive behaviors he is concerned about.         Significant Losses / Trauma / Abuse / Neglect Issues:   Patient did not serve in the .  There are indications or report of significant loss, trauma, abuse or neglect issues related to: death of brother, and a buddies wife committed suicide and homelessness currently.  Concerns for possible neglect are not present.     Safety Assessment:   Current Safety Concerns:  [place] Suicide Severity Rating Scale (Short Version)  [place] Suicide Severity Rating (Short Version) 10/11/2022 10/12/2022 10/12/2022 12/1/2022 1/16/2023   Over the past 2 weeks have you felt down, depressed, or hopeless? no - - no no   Over the  past 2 weeks have you had thoughts of killing yourself? no - - no no   Have you ever attempted to kill yourself? no - - no no   Q1 Wished to be Dead (Past Month) - no no - no   Q2 Suicidal Thoughts (Past Month) - no no - no   Q3 Suicidal Thought Method - no no - no   Q4 Suicidal Intent without Specific Plan - no no - no   Q5 Suicide Intent with Specific Plan - no no - no   Q6 Suicide Behavior (Lifetime) - no no - no   Level of Risk per Screen - low risk low risk - low risk   Required Interventions - Room made safe;Patient searched;Belongings removed - - -     Patient denies current homicidal ideation and behaviors.  Patient denies current self-injurious ideation and behaviors.    Patient denied risk behaviors associated with substance use.  Patient denies any high risk behaviors associated with mental health symptoms.  Patient reports the following current concerns for their personal safety: None.  Patient reports there are not firearms in the house.      History of Safety Concerns:  Patient denied a history of homicidal ideation.     Patient denied a history of personal safety concerns.    Patient denied a history of assaultive behaviors.    Patient denied a history of sexual assault behaviors.     Patient reported a history unsafe motor vehicle operation associated with substance use.  Patient denies any history of high risk behaviors associated with mental health symptoms.  Patient reports the following protective factors: positive relationships positive social network and sober network, forward/future oriented thinking, dedication to family/friends, safe and stable environment, regular sleep, regular physical activity, sense of belonging *, purpose *, secure attachment, help seeking behaviors when distressed *, adherence with prescribed medication, living with other people, daily obligations, structured day, uses community crisis resources, effective problem-solving skills, committment to well-being, sense of  meaning, positive social skills, healthy fear of risky behaviors or pain, financial stability, strong sense of self-worth/esteem, sense of personal control or determination and pets    Risk Plan:  See Recommendations for Safety and Risk Management Plan    Review of Symptoms per patient report:  Substance Use:  passing out, vomiting, hangovers, other substance related medical issue *, daily use, work absence due to substance use, social problems related to substance use and cravings/urges to use     Collateral Contact Summary:   Collateral contacts contributing to this assessment:  Medical record    If court related records were reviewed, summarize here: N/A    Information from collateral contacts supported/largely agreed with information from the client and associated risk ratings.    Information in this assessment was obtained from the medical record and provided by patient who is a fair historian.    Patient will have open access to their mental health medical record.    Diagnostic Criteria: 1.) Alcohol/drug is often taken in larger amounts or over a longer period than was intended.  Met for Alcohol.  2.) There is a persistent desire or unsuccessful efforts to cut down or control alcohol/drug use.  Met for Alcohol.  3.) A great deal of time is spent in activities necessary to obtain alcohol, use alcohol, or recover from its effects.  Met for Alcohol.  4.) Craving, or a strong desire or urge to use alcohol/drug.  Met for Alcohol.  5.) Recurrent alcohol/drug use resulting in a failure to fulfill major role obligations at work, school or home.  Met for Alcohol.  6.) Continued alcohol use despite having persistent or recurrent social or interpersonal problems caused or exacerbated by the effects of alcohol/drug.  Met for Alcohol.  7.) Important social, occupational, or recreational activities are given up or reduced because of alcohol/drug use.  Met for Alcohol.  8.) Recurrent alcohol/drug use in situations in which  it is physically hazardous.  Met for Alcohol.  9.) Alcohol/drug use is continued despite knowledge of having a persistent or recurrent physical or psychological problem that is likely to have been caused or exacerbated by alcohol.  Met for Alcohol.  10.) Tolerance, as defined by either of the following: A need for markedly increased amounts of alcohol/drug to achieve intoxication or desired effect. and A markedly diminished effect with continued use of the same amount of alcohol/drug..  Met for Alcohol.  11.) Withdrawal, as manifested by either of the following: The characteristic withdrawal syndrome for alcohol/drug (refer to Criteria A and B of the criteria set for alcohol/drug withdrawal). and Alcohol/drug (or a closely related substance, such as a benzodiazepine) is taken to relieve or avoid withdrawal symptoms.. Met for Alcohol.     As evidenced by self report and criteria, client meets the following DSM5 Diagnoses:   (Sustained by DSM5 Criteria Listed Above)  Alcohol Use Disorder   303.90 (F10.20) Severe In a controlled environment.    Recommendations:     1. Plan for Safety and Risk Management:  Recommended that patient call 911 or go to the local ED should there be a change in any of these risk factors..      Report to child / adult protection services was NA.     2. ALAN Referrals:   Recommendations:  [facility].  Patient reports they are willing to follow these recommendations.     Patient would like the following family or other support people involved in their treatment: none. Patient has a history of opiate use and was give treatment options, including Medication Assisted Treatment, and information on the risks of opiod use disorder including recognizing and responding to opiod overdose.    3. Mental Health Referrals:  None     4. Patient identified no cultural concerns that need to be addressed in treatment.    5. Recommendations for treatment focus:   Alcohol / Substance Use - sober living skills and  relapse prevention.     Clinical Substantiation:  Summary: Discussed with pt their desired outcome; reviewed living situation and community supports; reviewed type of use and risk factors for continued use. Risk ratings/justification below:   Dimension 1 -  Acute Intoxication/Withdrawal: 1 - Minor Problem Pt was treated for withdrawal and medically stable at discharge  Dimension 2 - Biomedical: 1 - Minor Problem Pt would benefit from engaging with a PCP for preventative treatment  Dimension 3 - Emotional/Behavioral/Cognitive Conditions: 2 - Moderate Problem Hx of ROCHELLE and PTSD.  Pt denies current siagnosis or problems  Dimension 4 - Readiness to Change:  0 - No Problem Pt is voluntary and seeking treatment  Dimension 5 - Relapse/Continued Use/ Continued Problem Potential: 4 - Extreme Problem Pt reports cravings for alcohol and has been unable to stop using outside a structured environment  Dimension 6 - Recovery Environment:  2 - Moderate Problem Pt is employed but misses work due to use.  Pt lives with **.  Pt denies legal problems.      Placement/Program/Barriers Identified: None    Referral: [facility]      TUSHAR Assessment ID: [id number]    Provider Name/ Credentials:  Oriana Simms MS, [facility]   January 18, 2023

## 2023-01-18 NOTE — PROGRESS NOTES
.Brief Medicine Note:     Medicine is following peripherally for abdominal pain with history of pancreatitis. Pt feeling better, some mild abdominal pain, but decreasing. Lipase still normal and down trending.     Discharge recommendations:  - Follow-up with PCP within the month.     Medicine will sign off, please contact us for any acute changes.       Marilee Johns, CNP, APRN  Internal Medicine [facility] Hospitalist  [facility]

## 2023-01-19 NOTE — GROUP NOTE
Group Therapy Documentation    PATIENT'S NAME: Frank Arguello  MRN:   [id number]  :   1985  ACCT. NUMBER: [id number]  DATE OF SERVICE: 23  START TIME: 12:30 PM  END TIME:  2:30 PM  FACILITATOR(S): Catie Camilo  TOPIC: BEH Group Therapy  Number of patients attending the group:  9    Group Length:  2 Hours    Group Therapy Type: Recovery strategies and Daily living/independence skills    Summary of Group / Topics Discussed:    Spiritual Care      Group Attendance:  Other - Patient was not yet admitted    Patient's response to the group topic/interactions:  Patient was not yet admitted    Patient appeared to be Patient was not yet admitted.         Client specific details:  Patient was not yet admitted.
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
## 2023-01-19 NOTE — PROGRESS NOTES
01/18/23 2100   Group Therapy Session   Group Attendance attended group session   Time Session Began 1645   Time Session Ended 1735   Total Time (minutes) 45   Total # Attendees 10   Group Type recreation   Group Topic Covered coping skills/lifestyle management   Group Session Detail healthy coping skills   Patient Response/Contribution cooperative with task   Patient Participation Detail Pt participated in Therapeutic Recreation group with focus on leisure education and acquisition of knowledge and skills. Pt was fully engaged and cooperative in group recreational intervention; leisure inventory. Pt was focused on the written portion for the first part of group and then contributed to the group discussion following. Pt discussed several recreational interests and positive coping skills that are healthy outlets. Pt mood was calm and was appropriate with interactions.
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
## 2023-01-19 NOTE — DISCHARGE SUMMARY
Frank Arguello MRN# [id number]   Age: 37 year old YOB: 1985     Date of Admission:  1/16/2023  Date of Discharge:  1/19/2023  Admitting Physician:  Gautam Jimenez MD  Discharge Physician:  Gautam Jimenez MD      DISCHARGE  DX    Alcohol use disorder severe           Event Leading to Hospitalization:     See Admission note by admitting provider for patient encounter. for additional details.          Hospital Course:   PATIENT was admitted to [facility] attending  under DR jimenez, please review the detailed admit note on 1/17/23   The patient was placed under status 15 (15 minute checks) to ensure patient safety.   MSSA protocol was initiated due to the patient's history of alcohol abuse and concern for withdrawal symptoms.  CBC, BMP and utox obtained.    All outpatient medications were continued  Pt started on lexapro 10 mg po qd  PATIENTdid participate in groups and was visible in the milieu.     The patient's symptoms of withdrawal improved.     Patients energy motivation , sleep appetite improved.  Pt completed detox . It was un eventful.      Discussed with patient medications for craving.  Pt is not  willing to take  antabuse/naltrexone/campral    Spoke with patient about triggers coping skills relapse prevention.    CONSULTS DONE DURING PATIENTS HOSPITALIZATION.  Patient was seen by medicine on date1/17/23    This as per their medical consult    Assessment & Plan     Frank Arguello is a 37 year old male admitted on 1/16/2023. He has a past medical history of severe alcohol use disorder, alcohol detox with seizures/DTs, adjustment disorder with anxious mood, depression, HTN, herpes simplex infection of the penis, polysubstance abuse (benzos, narcotics, Adderall), overdose on opiates,  chronic pancreatitis, pancreatic pseudocyst, steatosis of the liver, tobacco dependence, and GERD who presents to the ED seeking detox.     Medicine will continue to follow to trend lipase and  abdominal pain.      Acute Alcohol Withdrawal  Alcohol Use Disorder  Polysubstance Abuse Disorder (benzos, narcotics, Adderall)  Last drink on 3 hours PTA on 1/16. Drinks 750 mL-1L  of vodka liquour daily.History of seizures or DT's. Blood alcohol level of 0.39. Utox negative. Alk phos WNL.Total bili WNL. AST: ALT of 150:83, in 2:1 pattern with alcohol use.   - Management per Psychiatry  - No need for BMP/hepatic panel unless clinically indicated   - Agree with MSSA protocol  - Agree with MVI, folate, thiamine      HTN  Elevated BP in setting of withdrawl  Mildly elevated in 130-140s/90s.   - Continue metoprolol 50 mg daily, hold parameters placed  - Always recheck BP if high or low and correlate with clinical situation  - Treat pain, anxiety, and any withdrawal s/s if present  - Trend  - Notify provider if SBP >170 or DBP >110 after careful reassessment, treatment of pain/anxiety/withdrawal/home PTA med adminstration     Chronic pancreatitis, alcohol induced  Pancreatic cyst  Mild abdominal aching and tenderness profuse through entire abdomen. Lipase 57 on 1/16. No vomiting, but some nausea  -Trend lipase, abdominal pain, inability to take PO.   - Consider CT noncon if worsening abdominal pain     Anxiety  Depression  - Per psychiatry     Herpes simplex of penis: No current flare.  GERD: Continue 20 mg ompramazole PTA  Tobacco use disorder: Nicotine patch + lozenges        The patient's care was discussed with the Bedside Nurse, Patient and Primary team.        Clinically Significant Risk Factors Present on Admission                                               Pt was seen by cm  As per recommendations from cm    Met with Pt and completed assessment.  Pt may transfer to [facility] if bed available.         Labs:reviewed with patient       Recent Results (from the past 48 hour(s))   Lipase    Collection Time: 01/17/23  4:00 PM   Result Value Ref Range    Lipase 45 (L) 73 - 393 U/L         Recent Results (from the past 240  hour(s))   Alcohol breath test POCT    Collection Time: 01/16/23  2:50 PM   Result Value Ref Range    Alcohol Breath Test 0.416 (A) 0.00 - 0.01   EKG 12-lead, tracing only    Collection Time: 01/16/23  3:13 PM   Result Value Ref Range    Systolic Blood Pressure  mmHg    Diastolic Blood Pressure  mmHg    Ventricular Rate 106 BPM    Atrial Rate 106 BPM    MT Interval 206 ms    QRS Duration 98 ms     ms    QTc 456 ms    P Axis 113 degrees    R AXIS 44 degrees    T Axis 104 degrees    Interpretation ECG       Sinus tachycardia  ST elevation consider inferior injury or acute infarct  ** ** ACUTE MI / STEMI ** **  Consider right ventricular involvement in acute inferior infarct  Abnormal ECG  Unconfirmed report - interpretation of this ECG is computer generated - see medical record for final interpretation  Confirmed by - EMERGENCY ROOM, PHYSICIAN ([id number]),  ZOIE MONTILLA ([id number]) on 1/16/2023 7:39:19 PM     Drug abuse screen 1 urine (ED)    Collection Time: 01/16/23  3:18 PM   Result Value Ref Range    Amphetamines Urine Screen Negative Screen Negative    Barbiturates Urine Screen Negative Screen Negative    Benzodiazepines Urine Screen Negative Screen Negative    Cannabinoids Urine Screen Negative Screen Negative    Cocaine Urine Screen Negative Screen Negative    Opiates Urine Screen Negative Screen Negative   Comprehensive metabolic panel    Collection Time: 01/16/23  3:38 PM   Result Value Ref Range    Sodium 138 133 - 144 mmol/L    Potassium 3.7 3.4 - 5.3 mmol/L    Chloride 101 94 - 109 mmol/L    Carbon Dioxide (CO2) 20 20 - 32 mmol/L    Anion Gap 17 (H) 3 - 14 mmol/L    Urea Nitrogen 12 7 - 30 mg/dL    Creatinine 0.71 0.66 - 1.25 mg/dL    Calcium 8.8 8.5 - 10.1 mg/dL    Glucose 97 70 - 99 mg/dL    Alkaline Phosphatase 75 40 - 150 U/L     (H) 0 - 45 U/L    ALT 83 (H) 0 - 70 U/L    Protein Total 8.6 6.8 - 8.8 g/dL    Albumin 4.5 3.4 - 5.0 g/dL    Bilirubin Total 0.5 0.2 - 1.3 mg/dL    GFR Estimate  >90 >60 mL/min/1.73m2   Ethyl Alcohol Level    Collection Time: 01/16/23  3:38 PM   Result Value Ref Range    Alcohol ethyl 0.39 (HH) <=0.01 g/dL   CBC with platelets and differential    Collection Time: 01/16/23  3:38 PM   Result Value Ref Range    WBC Count 9.6 4.0 - 11.0 10e3/uL    RBC Count 4.87 4.40 - 5.90 10e6/uL    Hemoglobin 15.4 13.3 - 17.7 g/dL    Hematocrit 45.0 40.0 - 53.0 %    MCV 92 78 - 100 fL    MCH 31.6 26.5 - 33.0 pg    MCHC 34.2 31.5 - 36.5 g/dL    RDW 12.4 10.0 - 15.0 %    Platelet Count 141 (L) 150 - 450 10e3/uL    % Neutrophils 78 %    % Lymphocytes 15 %    % Monocytes 6 %    % Eosinophils 0 %    % Basophils 1 %    % Immature Granulocytes 0 %    NRBCs per 100 WBC 0 <1 /100    Absolute Neutrophils 7.4 1.6 - 8.3 10e3/uL    Absolute Lymphocytes 1.4 0.8 - 5.3 10e3/uL    Absolute Monocytes 0.6 0.0 - 1.3 10e3/uL    Absolute Eosinophils 0.0 0.0 - 0.7 10e3/uL    Absolute Basophils 0.1 0.0 - 0.2 10e3/uL    Absolute Immature Granulocytes 0.0 <=0.4 10e3/uL    Absolute NRBCs 0.0 10e3/uL   Troponin I    Collection Time: 01/16/23  3:38 PM   Result Value Ref Range    Troponin I High Sensitivity 9 <79 ng/L   Lipase    Collection Time: 01/16/23  3:38 PM   Result Value Ref Range    Lipase 57 (L) 73 - 393 U/L   Asymptomatic Influenza A/B & SARS-CoV2 (COVID-19) Virus PCR Multiplex Nasopharyngeal    Collection Time: 01/16/23  5:22 PM    Specimen: Nasopharyngeal; Swab   Result Value Ref Range    Influenza A PCR Negative Negative    Influenza B PCR Negative Negative    RSV PCR Negative Negative    SARS CoV2 PCR Negative Negative   Lipase    Collection Time: 01/17/23  4:00 PM   Result Value Ref Range    Lipase 45 (L) 73 - 393 U/L            Because this patient meets criteria for an Alcohol Use Disorder, I performed the following brief intervention on the date of this note:              1) Expressed concern that the patient is drinking at unhealthy levels known to increase their risk of alcohol related problems               2) Gave feedback linking alcohol use and health, including personalized feedback explaining how alcohol use can interact with their medical and/or psychiatric problems, and with prescribed medications.              3) Advised patient to abstain.    PT counseled on nicotine cessation and nicotine replacement provided    Counseled the patient on the importance of having a recovery program in addition to medication to manage recovery.  Components include avoiding isolating, having willingness to change, avoiding triggers and managing cravings. Encouraged having some type of sober network and practicing honesty with trusted support person(s).     Discussed with patient many issues of addiction,triggers, relapse, and establishing a solid recovery program.    DISCHARGE MENTAL STATUS EXAMINATION:  The patient is alert, oriented x3.  Good fund of knowledge.  Good use of language.  Recent and remote memory, language, fund of knowledge are all adequate.  Euthymic mood congruent affect  Speech normal rate/rhythm linear tp no loose asso,The patient does not have any active suicidal or homicidal ideation.  Does not have any auditory or visual hallucination.  Fair insight/judgment At this time, the patient was stable to be discharged.        Pt was not determined to not be a danger to himself or others. At the current time of discharge, the patient does not meet criteria for involuntary hospitalization. On the day of discharge, the patient reports that they do not have suicidal or homicidal ideation and would never hurt themselves or others. Steps taken to minimize risk include: assessing patient s behavior and thought process daily during hospital stay, discharging patient with adequate plan for follow up for mental and physical health and discussing safety plan of returning to the hospital should the patient ever have thoughts of harming themselves or others. Therefore, based on all available evidence including the  factors cited above, the patient does not appear to be at imminent risk for self-harm, and is appropriate for outpatient level of care.     Educated about side effects/risk vs benefits /alternative including non treatment.Pt consented to be on medication.     .Total time spent on discharge summary more than 35 min  More than  20 min  planning, coordination of care, medication reconciliation and performance of physical exam on day of discharge.Care was coordinated with unit RN and unit therapist         Review of your medicines      UNREVIEWED medicines. Ask your doctor about these medicines      Dose / Directions   clotrimazole 1 % external cream  Commonly known as: LOTRIMIN      Apply topically 2 times daily  Refills: 0     gabapentin 300 MG capsule  Commonly known as: NEURONTIN  Used for: Alcohol withdrawal syndrome without complication (H)      Dose: 300 mg  Take 1 capsule (300 mg) by mouth 3 times daily  Quantity: 90 capsule  Refills: 0     hydrOXYzine 25 MG tablet  Commonly known as: ATARAX  Used for: Alcohol withdrawal syndrome without complication (H)      Dose: 25 mg  Take 1 tablet (25 mg) by mouth every 4 hours as needed for anxiety  Quantity: 30 tablet  Refills: 0     metoprolol succinate ER 50 MG 24 hr tablet  Commonly known as: TOPROL XL  Used for: Alcohol withdrawal syndrome without complication (H)      Dose: 50 mg  Take 1 tablet (50 mg) by mouth daily  Quantity: 30 tablet  Refills: 0     multivitamin w/minerals tablet  Used for: Alcohol dependence with intoxication with complication (H)      Dose: 1 tablet  Take 1 tablet by mouth daily  Quantity: 30 tablet  Refills: 0     naloxone 4 MG/0.1ML nasal spray  Commonly known as: NARCAN  Used for: Alcohol withdrawal syndrome without complication (H)      Dose: 4 mg  Spray 1 spray (4 mg) into one nostril alternating nostrils as needed every 2-3 minutes until assistance arrives  Quantity: 0.2 mL  Refills: 1     nicotine 2 MG gum  Commonly known as:  "NICORETTE  Used for: Alcohol withdrawal syndrome without complication (H)      Dose: 2 mg  Place 1 each (2 mg) inside cheek every hour as needed for other (nicotine withdrawal symptoms)  Quantity: 30 each  Refills: 0     nicotine 21 MG/24HR 24 hr patch  Commonly known as: NICODERM CQ  Used for: Alcohol withdrawal syndrome without complication (H)      Dose: 1 patch  Place 1 patch onto the skin daily  Quantity: 30 patch  Refills: 0     ondansetron 8 MG ODT tab  Commonly known as: ZOFRAN ODT      Dose: 8 mg  Take 8 mg by mouth every 12 hours as needed for nausea  Refills: 0     QUEtiapine 25 MG tablet  Commonly known as: SEROquel  Used for: Primary insomnia, Anxiety, Chronic abdominal pain, Uncomplicated opioid dependence (H), Alcohol-induced acute pancreatitis without infection or necrosis      Dose: 25 mg  Take 1 tablet (25 mg) by mouth at bedtime as needed, may repeat once (for sleep as needed)  Quantity: 30 tablet  Refills: 0     thiamine 100 MG tablet  Commonly known as: B-1  Used for: Alcohol withdrawal syndrome without complication (H)      Dose: 100 mg  Take 1 tablet (100 mg) by mouth daily  Quantity: 30 tablet  Refills: 0     triamcinolone 0.1 % external cream  Commonly known as: KENALOG      Apply topically 2 times daily  Refills: 0     valACYclovir 500 MG tablet  Commonly known as: VALTREX      Dose: 500 mg  Take 500 mg by mouth daily  Refills: 0             Disposition: lp     Facts about COVID19 at www.cdc.gov/COVID19 and www.[place].gov/covid19     Keeping hands clean is one of the most important steps we can take to avoid getting sick and spreading germs to others.  Please wash your hands frequently and lather with soap for at least 20 seconds!     Medical Follow-Up:[facility] in 3-4 weeks        Treatment Follow[facility]   .        \"Much or all of the text in this note was generated through the use of Dragon Dictate voice to text software. Errors in spelling or words which appear to be out of contact are " "unintentional, may be present due having escaped editing\"     "

## 2023-01-19 NOTE — PROGRESS NOTES
Problem: Behavioral Health Plan of Care  Goal: Optimal Comfort and Wellbeing  Outcome: Ongoing, Progressing    Behavioral  Pt appeared sleeping comfortably overnight. No behavioral concerns noted.     Medical  Pt out of ETOH withdrawal.  Pt has received a total 95 mg of valium this hospitalization.   Pt last required medication for withdrawal on 1/17/2 @ 00:25 am.   Pt hoping to discharge to [facility]. Pt on restrictions on unit with no personal property allowed and scrubs on unit. Pt hid a container of tobacco under his mattress that was found by staff.   No new medical concerns noted.

## 2023-01-19 NOTE — PLAN OF CARE
Problem: Alcohol Withdrawal  Goal: Alcohol Withdrawal Symptom Control  Outcome: Met     Pt has not required valium for 24 hours for withdrawal symptoms. Per unit policy, Pt is out of detox. Pt is planning to attend [facility].     Pt endorses anxiety. Pt presents with blunted/flat affect.

## 2023-01-19 NOTE — PROGRESS NOTES
"[facility] Assessment      Vital signs:     Ht 1.905 m (6' 3\")   Wt 89.6 kg (197 lb 8 oz)   BMI 24.69 kg/m        Direct admission    Counselor: Yonis  Drug of Choice: alcohol, marijuana, opiates  Last use: Alcohol - 1/16/23.  Methadone ended in 2020 (right before COVID) Marijuana - earlier this month  Home clinic/MD:   [facility]  Renetta Guthrie MD   Medical clinic in [place], [place]  [place] [address] [place], [place] [id number] [phone]  Pt stated he is going to make an appt    Psychiatrist/therapist: none    Medical history/current conditions:    Acute Alcohol Withdrawal  Alcohol Use Disorder  Polysubstance Abuse Disorder (benzos, narcotics, Adderall)  HTN  Herpes simplex of penis  Anxiety   Depression    H&P Screen:  H&P within the last 90 days: Yes.  Date: 1/19/23 Location: 3A/detox      Mental Health diagnosis: Anxiety/depression  Medication compliant?: yes  Recent sucidal thoughts? no     When? na  Current thought of self-harm? no    Plan? na    Pain assessment:   Pt. Experiencing pain at this time?  C5/C6 disc.  5/10 pain. Work related    [facility] Screen for COVID-19    ______________________________________________________________________________________________________________________  Do you have any of the following NEW or worsening symptoms NOT attributed to pre-existing conditions?    No    o Fever of 100.0  F (37.8 C) or over  o Chills  o Cough  o Shortness of Breath  o Loss of taste or smell  o Generalized body aches  o Persistent headache  o Sore throat (or trouble eating or drinking in young children?)  o Nausea, Vomiting, or diarrhea (loose stools)    Did you test positive for COVID-19 in the last 10 days or are you waiting on the test results due to an exposure or symptoms?  No    Has anyone told you to self-quarantine due to exposure to someone with COVID-19?  No    If pt responds   YES to any of the " symptoms or  Positive COVID-19 result in the last 10 days with or without symptoms or YES to symptoms with pending results ptwill need to leave unit immediately and can return in 11 days from discharge date.    ______________________________________________________________________________________________________________________  If you are admitting directly from the community you will be required to stay in your room until COVID lab results confirm negative. If COVID results are positive, You will have to exit the LP program, quarantine as recommended per CDC and then may return for CD treatment after symptoms have resolved.  What is your exit plan should you be positive for COVID today or anytime during your stay? My mothers house    COVID-19 Test completed by LPRN ? Yes    COVID-19 - Pt informed of the following while at :    1) Practice good hand washing hygiene and avoid touching face    2) If pt has any of the symptoms below, notify staff immediately.      Fever     Cough     Shortness of breath or difficulty breathing     Chills     Repeated shaking with chills    Muscle pain     3) COVID-19 testing may be initiated more than once during your stay.  If COVID results are at anytime positive, the pt will follow exit plan as listed above,  quarantine as recommended per CDC and then may return for CD treatment after symptoms have resolved.     4) Per COVID protocol, during your stay at , social distancing is required AND mouth and nose must be covered at all times with facial mask while out in milieu.      5) Patients will not be allowed to go to any outside appointments, all outside appointments will need to be virtual or by phone    RN Assessment of Patient's Ability to Safely Manage and Self-Administer Respiratory Treatments    Has experience in the management of Respiratory (If NA, indicate and move to Integrative Therapies): NA    Integrative Therapies: Essential Oils    Patient requesting essential oil  inhaler to manage (Mood/Mental Health/Physical/Spiritual symptoms).     Discussed appropriate use of essential oil inhalers and instructed patient not to leave labeled product out on unit.     Patient was screened for kidney disease, asthma/reactive airway disease and rashes and wounds or 1st trimester of pregnancy    List Essential Oils requested by pt Calm/Lavandar/Sweet [place]    Patient verbalized and demonstrated understanding of how to use essential oil inhaler correctly and will notify LP RN with any concerns or side effects. Patient agrees not to share their essential oil inhaler with other clients.  Continue to support the patient in safely utilizing integrative therapies as able to manage symptoms during treatment.     Patient tobacco use:    Do you use tobacco? yes  Type? cigs  How often? daily  How much? 1/2 pack  Are you interested in quitting? no    NRT (Nicotine Replacement therapy) ordered? Pt denied at this time   Pt is aware of the dangers of tobacco cessation and in contemplation.    Pt given written education.    Nutritional Assessment:    Have you ever purged, binged or restricted yourself as a way to control your weight?   No     Are you on a special diet?   No     Do you have any concerns regarding your nutritional status?   No     Have you had any appetite changes in the last 3 months?   No   Have you had weight loss or weight gain of more than 10 lbs in the last 3 months?   If patient gained or lost more than 10 lbs, then refer to program RN / attending Physician for assessment.   No   Was the patient informed of BMI?    Normal, No Intervention   Yes   Have you engaged in any risk-taking behavior that would put you at risk for exposure to blood-borne or sexually transmitted diseases?   No   Do you have any dental problems?   No       LPRN reviewed with pt the below 'medical concerns/medication refill expectations' while at LP Yes    LP has no rounding provider to assess medical issues or to  refill your medications    Please make virtual/phone appt/s with your community provider/s and notify LPRN of date and time    You may not leave LP to attend any medical appt's.     You are responsible for having a plan to refill medications if necessary.  Please allow time to complete this.    Pt verbalizes understanding of the above criteria yes    Nursing Assessment Summary:  As above    On-going nursing intervention required?   No    Acute care visit recommended: no

## 2023-01-19 NOTE — PROGRESS NOTES
Initial Services Plan        Before your first treatment group, please do the following    Immediate health & safety concerns: Go to the emergency room if you start to have withdrawal symptoms.  Obtain personal items (glasses, hearing aides, medicines, diabetes supplies, clothing, etc.).  Look for a support network (such as AA, NA, DBT group, a Caodaism group, etc.)    Suggestions for client during the time between intake & completion of treatment plan:  Tour your treatment center (unit or outpatient clinic).  Introduce yourself to the treatment group.  Spend time getting to know your peers.  Complete your psycho-social paperwork.  Complete the problem list for your treatment plan.  Start drug and alcohol use history.  Review your patient or client handbook.    Client issues to be addressed in the first treatment sessions:  Identify motivations(s) for coming to treatment, i.e. legal, family, job, self  Identify concerns about going to group, i.e. fear of talking in group  Identify outside concerns that may interfere with treatment, i.e. bills not getting paid, homesick for children      ABRIL Vega  1/19/2023  12:40 PM

## 2023-01-19 NOTE — PROGRESS NOTES
Progress Note    This patient had a Comprehensive Substance Abuse assessment on 1-18-23 completed by ABRIL Blevins.  This patient was seen for a face to face update of the Comprehensive Substance Abuse assessment on 1/19/2023  by ABRIL Vega.  INSIDE: The patient's Comprehensive Substance Abuse assessment completed on 1-18-23 is in the patient's electronic medical record in Epic in the Chart Review section under the Notes/Trans Tab.    Alcohol/Drug use since the last CD evaluation (include date of last use):     No additional substances use since the last CD evaluation  Pt denies any withdrawal symptoms.      Please note any other clinical changes since the last CD evaluation (such as medication changes, additional legal charges, detoxification admissions, overdoses, etc.)     No significant changes since the last CD evaluation        ASAM Dimensions Original scores Current Scores   I.) Intoxication and Withdrawal: 1 0   II.) Biomedical:  1 1   III.) Emotional and Behavioral:  2 2   IV.) Readiness to Change:  0 0   V.) Relapse Potential: 4 4   VI.) Recovery Environmental: 2 2     Please list clinical justifications for the above ASAM score changes since the original comprehensive assessment:     The patient's score on Dimension I changed from a 1 to a 0.  The patient's withdrawal symptoms had been addressed by his physicians while he had been on 3 A IP detoxification unit at [facility] in [place], [place].         Current BEATRIS: Current UA:     No BEATRIS as the patient was a direct transfer from 3 A IP detoxification unit at [facility] in [place], [place].     No UA screen as the patient was a direct transfer from 3 A IP detoxification unit at [facility] in [place], [place].        PHQ-9, ROCHELLE-7   PHQ-9 on 1/19/2023  ROCHELLE-7 on 5/18/2021 1/19/2023    The patient's PHQ-9 score was 15 out of 27, indicating moderately severe depression. The patient's ROCHELLE-7 score was 10 out of 21,  "indicating moderate anxiety.       Additional Risk Factors:    A recent death of someone close to the patient and/or unresolved grief and loss issues     Significant history of trauma and/or abuse issues     History of impulsive or aggressive behaviors   Protective Factors:    Having people in his/her life that would prevent the patient from considering a suicide attempt (i.e. young children, spouse, parents, etc.)     Having pet(s) that give companionship and/or would prevent the patient from considering a suicide attempt     An absence of mental health issues or stable and well treated mental health issues     An absence of chronic health problems or stable and well treated chronic health issues     A positive relationship with his/her clinical medical and/or mental health providers     Having easy access to supportive family members     Having restricted access to highly lethal means of suicide     Risk Status     0. - Very Low Risk:  Evaluation Counselors:  Document in Epic / SBAR to counselor \"Very Low Risk\".      Treatment Counselors:  Reassess upon admission as applicable, assess weekly in progress notes under Dimension 3 and summarize in Discharge / Treatment summary under Dimension 3.     Additional information to support suicide risk rating: There was no additional information to provide at this time.     [place] Suicide Severity Rating Scale (Lifetime/Recent)  [place] Suicide Severity Rating (Lifetime/Recent) 3/9/2016 8/3/2016 8/20/2016 10/12/2022 10/12/2022 1/16/2023 1/19/2023   Wish to be Dead (Lifetime) - - - - No No -   Non-Specific Active Suicidal Thoughts (Lifetime) - - - - No No -   Q1 Wished to be Dead (Past Month) - - - no no no -   Q2 Suicidal Thoughts (Past Month) - - - no no no -   Q3 Suicidal Thought Method - - - no no no -   Q4 Suicidal Intent without Specific Plan - - - no no no -   Q5 Suicide Intent with Specific Plan - - - no no no -   Q6 Suicide Behavior (Lifetime) - - - no no no - "   Level of Risk per Screen - - - low risk low risk low risk -   Do you have guns available to you? No No No - - - -   Where are the guns now? - - - - - - -   1. Wish to be Dead (Lifetime) - - - - - - 0   2. Non-Specific Active Suicidal Thoughts (Lifetime) - - - - - - 0   Actual Attempt (Lifetime) - - - - - - 0   Has subject engaged in non-suicidal self-injurious behavior? (Lifetime) - - - - - - 0   Interrupted Attempts (Lifetime) - - - - - - 0   Aborted or Self-Interrupted Attempt (Lifetime) - - - - - - 0   Preparatory Acts or Behavior (Lifetime) - - - - - - 0   Calculated C-SSRS Risk Score (Lifetime/Recent) - - - - - - No Risk Indicated

## 2023-01-19 NOTE — PROGRESS NOTES
Pt signed MAGDALENE and ADA form was completed and emailed to his employer.  Pt given original of ADA form.

## 2023-01-19 NOTE — PLAN OF CARE
Goal Outcome Evaluation:    Plan of Care Reviewed With: patient               Patient discharging to [facility]. He reported anxiety, vistaril was given . He verbalized understanding of his discharge medications and instructions. He denied si/hi/hallucinations and pain. He contracted for safety.  He received all his belonging. He was given information to make follow uo appointment.

## 2023-01-20 NOTE — GROUP NOTE
Group Therapy Documentation    PATIENT'S NAME: Frank Arguello  MRN:   [id number]  :   1985  [id number]  DATE OF SERVICE: 23  START TIME:  9:00 AM  END TIME: 11:00 AM  FACILITATOR(S): Heidi Goncalves LADC  TOPIC: BEH Group Therapy  Number of patients attending the group: 11  Group Length:  2 Hours    Group Therapy Type: Emotion processing    Summary of Group / Topics Discussed:    Recovery Principles, Disease of addiction, and Emotions/expression      Group Attendance:  Attended group session    Patient's response to the group topic/interactions:  cooperative with task    Patient appeared to be Actively participating, Attentive and Engaged.        Client specific details: Frank was an active participant in morning group therapy session. He engaged in a discussion on trust and introduced himself to the group.
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
## 2023-01-20 NOTE — GROUP NOTE
Group Therapy Documentation    PATIENT'S NAME: Frank Arguello  MRN:   [id number]  :   1985  ACCT. NUMBER: [id number]  DATE OF SERVICE: 23  START TIME: 12:30 PM  END TIME:  2:30 PM  FACILITATOR(S): Shaye Prather LADC; Maria Del Carmen Ortez LADC; Sweta Jesus LADC  TOPIC: BEH Group Therapy  Number of patients attending the group:  30  Group Length:  2 Hours    Group Therapy Type: Emotion processing    Summary of Group / Topics Discussed:    Emotions/expression and Leisure explorations/use of leisure time      Group Attendance:  Attended group session    Patient's response to the group topic/interactions:  cooperative with task    Patient appeared to be Engaged.        Client specific details: Pt was appropriate and attentive PM group. Patient participated in recovery supportive activity, watching a therapeutic movie about addiction, the impacts on individual and loved ones.

## 2023-01-21 NOTE — GROUP NOTE
"Psychoeducation Group Documentation    PATIENT'S NAME: Frank Arguello  MRN:   [id number]  :   1985  ACCT. NUMBER: [id number]  DATE OF SERVICE: 23  START TIME: 12:30 PM  END TIME:  2:30 PM  FACILITATOR(S): Eulogio Pena LADC; Gaye Lake LADC  TOPIC: BEH Pyschoeducation  Number of patients attending the group:  11  Group Length:  2 Hours    Skills Group Therapy Type: Relationship skills development    Summary of Group / Topics Discussed:    Relationship/social skills          Group Attendance:  Attended group session    Patient's response to the group topic/interactions:  cooperative with task    Patient appeared to be Attentive.         Client specific details: Patient attended a skills lecture on \"Relationships\" and effective communication skills.  Patients gained knowledge on how to express their feelings in a healthy way, also each patient had an opportunity to process the information, ask questions        "

## 2023-01-21 NOTE — GROUP NOTE
Psychoeducation Group Documentation    PATIENT'S NAME: Frank Arguello  MRN:   [id number]  :   1985  ACCT. NUMBER: [id number]  DATE OF SERVICE: 23  START TIME:  9:00 AM  END TIME: 11:00 AM  FACILITATOR(S): Erich Mei LADC  TOPIC: BEH Pyschoeducation  Number of patients attending the group:  11  Group Length:  2 Hours    Skills Group Therapy Type: Recovery skills    Summary of Group / Topics Discussed:    Relationship/social skills, Balanced lifestyle skills, and Relapse prevention skills          Group Attendance:  Attended group session    Patient's response to the group topic/interactions:  cooperative with task    Patient appeared to be Attentive and Engaged.         Client specific details:  The patient participated in the morning lecture on boundaries and family roles.

## 2023-01-22 NOTE — GROUP NOTE
Psychoeducation Group Documentation    PATIENT'S NAME: Frank Arguello  MRN:   [id number]  :   1985  ACCT. NUMBER: [id number]  DATE OF SERVICE: 23  START TIME:  9:00 AM  END TIME: 11:00 AM  FACILITATOR(S): Oj Mas RN; Gaye Lake LADC  TOPIC: BEH Pyschoeducation  Number of patients attending the group:  11  Group Length:  2 Hours    Skills Group Therapy Type: Lecture by nurse    Summary of Group / Topics Discussed:    Symptom management skills          Group Attendance:  Attended group session    Patient's response to the group topic/interactions:  cooperative with task    Patient appeared to be Attentive.         Client specific details: This patient attended Lecture presented by: Nursing staff on HIV-Aids, and prevention and treatments. Patient engaged in Q&A after the lecture was presented. Patient actively engaged in group lecture.

## 2023-01-22 NOTE — GROUP NOTE
Psychoeducation Group Documentation    PATIENT'S NAME: Frank Arguello  MRN:   [id number]  :   1985  ACCT. NUMBER: [id number]  DATE OF SERVICE: 23  START TIME: 12:30 PM  END TIME:  1:30 PM  FACILITATOR(S): Erich Mei LADC; Gaye Lake LADC  TOPIC: BEH Pyschoeducation  Number of patients attending the group:  11  Group Length:  1 Hours    Skills Group Therapy Type: Recovery skills and Relationship skills development    Summary of Group / Topics Discussed:    Relationship/social skills          Group Attendance:  Attended group session    Patient's response to the group topic/interactions:  cooperative with task    Patient appeared to be Attentive and Engaged.         Client specific details:  The patient participated in the afternoon lecture on Relationships.

## 2023-01-23 NOTE — GROUP NOTE
Group Therapy Documentation    PATIENT'S NAME: Frank Arguello  MRN:   [id number]  :   1985  ACCT. NUMBER: [id number]  DATE OF SERVICE: 23  START TIME: 12:30 PM  END TIME:  2:30 PM  FACILITATOR(S): Erich Mei LADC  TOPIC: BEH Group Therapy  Number of patients attending the group:  9  Group Length:  2 Hours    Group Therapy Type: Recovery strategies    Summary of Group / Topics Discussed:    Recovery Principles, Coping/DBT informed care, Trauma informed care, Disease of addiction, Emotions/expression, and Relapse prevention      Group Attendance:  Attended group session    Patient's response to the group topic/interactions:  cooperative with task    Patient appeared to be Attentive and Engaged.        Client specific details:  Frank participated in afternoon group. He took part in a group introduction activity followed by a graduation for a group member. For the second part of group he listened to and gave positive feedback on a peer's assignment.

## 2023-01-23 NOTE — PROGRESS NOTES
Acknowledgement of Current Treatment Plan - Initial Treatment Plan     INITIAL TREATMENT PLAN:     1. I have participated in creating my treatment plan with my therapist / counselor on1/23/2023.     I agree with the plan as it is written in the electronic health record.    Name Signature/Date   Frank Arguello    Name of Therapist / Counselor Signature/Date   [facility]      2. I have completed and reviewed my Safety Plan with my counselor and signed this on 1/22/2023. I have been given the hard copy of this plan.    Patient signature/date:      ________________________________________________________________    3. Last Use Date: _1/16/23_________    Patient signature/date:     ________________________________________________________________

## 2023-01-23 NOTE — GROUP NOTE
Group Therapy Documentation    PATIENT'S NAME: Frank Arguello  MRN:   [id number]  :   1985  ACCT. NUMBER: [id number]  DATE OF SERVICE: 23  START TIME:  9:00 AM  END TIME: 11:00 AM  FACILITATOR(S): Justin Porter LADC  TOPIC: BEH Group Therapy  Number of patients attending the group:  11  Group Length:  2 Hours    Group Therapy Type: Recovery strategies    Summary of Group / Topics Discussed:    Recovery Principles, Sober coping skills, Balanced lifestyle, Disease of addiction, Relapse prevention, and Self-care activities      Group Attendance:  Attended group session    Patient's response to the group topic/interactions:  cooperative with task, discussed personal experience with topic, expressed readiness to alter behaviors, expressed understanding of topic, gave appropriate feedback to peers and listened actively    Patient appeared to be Actively participating, Attentive and Engaged.        Client specific details:  Frank gave appropriate feedback..

## 2023-01-24 NOTE — GROUP NOTE
Group Therapy Documentation    PATIENT'S NAME: Frank Arguello  MRN:   [id number]  :   1985  ACCT. NUMBER: [id number]  DATE OF SERVICE: 23  START TIME:  3:00 PM  END TIME:  4:00 PM  FACILITATOR(S): Catie Camilo  TOPIC: BEH Group Therapy  Number of patients attending the group:  25    Group Length:  1 Hour    Group Therapy Type: Recovery strategies, Emotion processing, and Daily living/independence skills    Summary of Group / Topics Discussed:    Recovery Principles, Cognitive behavioral therapy skills, and Relapse prevention      Group Attendance:  Attended group session    Patient's response to the group topic/interactions:  cooperative with task    Patient appeared to be Actively participating, Attentive and Engaged.        Client specific details:  Patient attended group lecture and was attentive and participative.

## 2023-01-24 NOTE — GROUP NOTE
Group Therapy Documentation    PATIENT'S NAME: Frank Arguello  MRN:   [id number]  :   1985  ACCT. NUMBER: [id number]  DATE OF SERVICE: 23  START TIME: 12:30 PM  END TIME:  2:30 PM  FACILITATOR(S): Heidi Goncalves LADC  TOPIC: BEH Group Therapy  Number of patients attending the group: 9  Group Length:  2 Hours    Group Therapy Type: Emotion processing    Summary of Group / Topics Discussed:    Sober coping skills, Co-occurring illnesses symptom management, Disease of addiction, and Emotions/expression      Group Attendance:  Attended group session    Patient's response to the group topic/interactions:  cooperative with task    Patient appeared to be Actively participating, Attentive and Engaged.        Client specific details: Frank was an active participant in afternoon group therapy session. He provided supportive feedback to his peer who presented a treatment plan assignment.

## 2023-01-24 NOTE — GROUP NOTE
Group Therapy Documentation    PATIENT'S NAME: Frank Arguello  MRN:   [id number]  :   1985  ACCT. NUMBER: [id number]  DATE OF SERVICE: 23  START TIME:  9:00 AM  END TIME: 11:00 AM  FACILITATOR(S): Justin Porter LADC  TOPIC: BEH Group Therapy  Number of patients attending the group:  9  Group Length:  2 Hours    Group Therapy Type: Recovery strategies and Emotion processing    Summary of Group / Topics Discussed:    Recovery Principles, Sober coping skills, Balanced lifestyle, Disease of addiction, and Relapse prevention      Group Attendance:  Attended group session    Patient's response to the group topic/interactions:  cooperative with task, discussed personal experience with topic, expressed readiness to alter behaviors, expressed understanding of topic, gave appropriate feedback to peers and listened actively    Patient appeared to be Actively participating, Attentive and Engaged.        Client specific details:  Syed gave appropriate feedback..

## 2023-01-25 NOTE — GROUP NOTE
Psychoeducation Group Documentation    PATIENT'S NAME: Frank Arguello  MRN:   [id number]  :   1985  ACCT. NUMBER: [id number]  DATE OF SERVICE: 23  START TIME:  8:30 AM  END TIME:  9:30 AM  FACILITATOR(S): Justin Porter LADC; Jaya Lundy MD; Erich Mei LADC  TOPIC: BEH Pyschoeducation  Number of patients attending the group:  9  Group Length:  1 Hours    Skills Group Therapy Type: Recovery skills    Summary of Group / Topics Discussed:    Balanced lifestyle skills          Group Attendance:  Attended group session    Patient's response to the group topic/interactions:  cooperative with task    Patient appeared to be Attentive.         Client specific details:  Syed gave appropriate feedback..

## 2023-01-25 NOTE — GROUP NOTE
Group Therapy Documentation    PATIENT'S NAME: Frank Arguello  MRN:   [id number]  :   1985  ACCT. NUMBER: [id number]  DATE OF SERVICE: 23  START TIME: 12:30 PM  END TIME:  2:30 PM  FACILITATOR(S): Heidi Goncalves LADC  TOPIC: BEH Group Therapy  Number of patients attending the group: 8  Group Length:  2 Hours    Group Therapy Type: Emotion processing    Summary of Group / Topics Discussed:    Relationship/socialization, Disease of addiction, and Emotions/expression      Group Attendance:  Attended group session    Patient's response to the group topic/interactions:  cooperative with task    Patient appeared to be Actively participating, Attentive and Engaged.        Client specific details:  Frank was an active participant in afternoon group therapy session.

## 2023-01-25 NOTE — PROGRESS NOTES
[facility] Weekly Treatment Plan Review    ATTENDANCE FOR THE FOLLOWING DATE SPAN: 23 TO 23    Weekly Treatment Plan Review     Treatment Plan initiated on: 23.    Dimension1: Acute Intoxication/Withdrawal Potential -   Previous Dimension Ratin  Current Dimension Ratin  Date of Last Use: 23  Any reports of withdrawal symptoms - No      Dimension 2: Biomedical Conditions & Complications -   Previous Dimension Ratin  Current Dimension Ratin  Medical Concerns: None reported  Current Medications & Medication Changes:  Current Outpatient Medications   Medication     acetaminophen (TYLENOL) 325 MG tablet     alum & mag hydroxide-simethicone (MAALOX) 200-200-20 MG/5ML SUSP suspension     benzocaine-menthol (CEPACOL) 15-3.6 MG lozenge     escitalopram (LEXAPRO) 10 MG tablet     gabapentin (NEURONTIN) 400 MG capsule     guaiFENesin (COUGH SYRUP PO)     hydrOXYzine (ATARAX) 25 MG tablet     ibuprofen (ADVIL/MOTRIN) 200 MG tablet     loratadine (CLARITIN) 10 MG tablet     melatonin 3 MG tablet     metoprolol succinate ER (TOPROL XL) 50 MG 24 hr tablet     multivitamin w/minerals (THERA-VIT-M) tablet     nicotine (NICORETTE) 4 MG gum     QUEtiapine (SEROQUEL) 25 MG tablet     senna-docusate (SENOKOT-S/PERICOLACE) 8.6-50 MG tablet     thiamine (B-1) 100 MG tablet     No current facility-administered medications for this encounter.     Facility-Administered Medications Ordered in Other Encounters   Medication     Self Administer Medications: Behavioral Services     Medication Prescriber: See chart  Taking meds as prescribed? Yes  Medication side effects or concerns:  None reported  Outside medical appointments this week (list provider and reason for visit):  None       Dimension 3: Emotional/Behavioral Conditions & Complications -   Previous Dimension Ratin  Current Dimension Ratin  PHQ2:   PHQ-2 (  [employer]) 2023   Q1: Little interest or pleasure  "in doing things 0 1 3   Q2: Feeling down, depressed or hopeless 1 1 2   PHQ-2 Score 1 2 5      GAD2:   ROCHELLE-2 2023   Feeling nervous, anxious, or on edge 2   Not being able to stop or control worrying 0   ROCHELLE-2 Total Score 2     Mental health diagnosis: Anxiety and depression  Date of last SIB: NA  Date of  last SI:  NA  Date of last HI: NA  Behavioral Targets:  Stabilize and maintain mental health.  Risk factors:  Early sobriety, grief/loss, unmanaged mental health symptoms, trauma history  Protective factors:  forward/future oriented thinking, abstinence from substances, adherence with prescribed medication, agreement to use safety plan, living with other people, structured day and access to a variety of clinical interventions  Current MH Assignments:  Understanding Depression and Addiction    Narrative:  Current Mental Health symptoms include: none reported.  Patient reports his mood this week has improved and he is \"feeling better\". Patient reports \"HALT\" as his primary coping skills he uses to manage stress and difficult emotions.  Patient denies any suicidal thoughts or ideations at this time. Patient will continue to be monitored throughout treatment.       Dimension 4: Treatment Acceptance / Resistance -   Previous Dimension Ratin  Current Dimension Ratin  ALAN Diagnosis:  Alcohol Use Disorder   303.90 (F10.20) Severe In a controlled environment  Opioid Use Disorder, Specify if:  In a controlled environment with a severity of:  305.50 (F11.10) Mild  Commitment to tx process/Stage of change-Contemplation  ALAN assignments - First Step      Narrative - Patient verbally reports being motivated for long-term recovery. Patient reports \"I need/have to be\" as his primary motivation to stay sober and in treatment this week.  Patient's group attendance and participation have been positive overall.       Dimension 5: Relapse / Continued Problem Potential -   Previous Dimension Ratin  Current Dimension " "Ratin  Relapses this week - None  Urges to use - YES, List \"3/10\"  UA results -   Recent Results (from the past 168 hour(s))   Asymptomatic COVID-19 Virus (Coronavirus) by PCR Nose    Collection Time: 23  2:15 PM    Specimen: Nose; Swab   Result Value Ref Range    SARS CoV2 PCR Negative Negative   Asymptomatic COVID-19 Virus (Coronavirus) by PCR Nose    Collection Time: 23  9:57 AM    Specimen: Nose; Swab   Result Value Ref Range    SARS CoV2 PCR Negative Negative   Asymptomatic COVID-19 Virus (Coronavirus) by PCR Nose    Collection Time: 23  9:59 AM    Specimen: Nose; Swab   Result Value Ref Range    SARS CoV2 PCR Negative Negative   Asymptomatic COVID-19 Virus (Coronavirus) by PCR Nose    Collection Time: 23  7:57 AM    Specimen: Nose; Swab   Result Value Ref Range    SARS CoV2 PCR Negative Negative       Narrative- Patient rated his cravings this week on a scale from 1(low) to 10(high) as a \"3\".  He could not determine what triggered these cravings other than \"control\". He reports \"take time\" as the coping skills he has utilized to manage these cravings. Patient attended Spirituality Group facilitated by Jamila Beaver.  He continues to work on developing relapse prevention strategies and sober coping skills.    Dimension 6: Recovery Environment -   Previous Dimension Ratin  Current Dimension Ratin  Family Involvement - Patient reports having phone contact with loved ones.  Summarize attendance at family groups and family sessions - NA  Family supportive of treatment?  Yes    Community support group attendance - Patient is attending nightly sober support meetings/lectures.  Recreational activities - Patient has been participating in offered program activities and leisure time with peers.    Narrative -  Patient has been spending time with same gender-peers and connecting with/building a sober support network. Patient reports his aftercare plan will include \"meetings, home, " "work, sponsor\".    Progress made on transition planning goals: None at this time    Justification for Continued Treatment at this Level of Care:    Patient continues to work on completing treatment plan goals and interventions.   Patient remains a high risk for relapse at this time and would benefit from continued support in developing relapse prevention strategies.   Patient's mental health symptoms are currently impacting his daily functioning and would benefit from continued support.    Treatment coordination activities this week:  None  Need for peer recovery support referral? No    Discharge Planning:  Target Discharge Date/Timeframe: 2/16/23   Med Mgmt Provider/Appt:  TBD   Ind therapy Provider/Appt:  TBD   Family therapy Provider/Appt:  TBD   Other referrals:  TBD      Has vulnerable adult status changed? No    Interdisciplinary Clinical Supervision including: LADC and Mental health professional    Are Treatment Plan goals/objectives effective? Yes  *If no, list changes to treatment plan:    Are the current goals meeting client's needs? Yes  *If no, list the changes to treatment plan.    Client Input / Response: Patient contributed to treatment plan review.      *Client agrees with any changes to the treatment plan: N/A  *Client received copy of changes: N/A  *Client is aware of right to access a treatment plan review: Yes     KEYANNA Pathak, ABRIL  "

## 2023-01-25 NOTE — GROUP NOTE
Group Therapy Documentation    PATIENT'S NAME: Frank Arguello  MRN:   [id number]  :   1985  ACCT. NUMBER: [id number]  DATE OF SERVICE: 23  START TIME:  9:40 AM  END TIME: 11:30 AM  FACILITATOR(S): Justin Porter LADC  TOPIC: BEH Group Therapy  Number of patients attending the group:  10  Group Length:  2 Hours    Group Therapy Type: Recovery strategies and Emotion processing    Summary of Group / Topics Discussed:    Recovery Principles, Relationship/socialization, Balanced lifestyle, Trauma informed care, Disease of addiction, Emotions/expression, and Relapse prevention      Group Attendance:  Attended group session    Patient's response to the group topic/interactions:  cooperative with task    Patient appeared to be Actively participating, Attentive and Engaged.        Client specific details:  Frank gave appropriate feedback..

## 2023-01-26 NOTE — GROUP NOTE
Psychoeducation Group Documentation    PATIENT'S NAME: Frank Arguello  MRN:   [id number]  :   1985  ACCT. NUMBER: [id number]  DATE OF SERVICE: 23  START TIME:  3:00 PM  END TIME:  4:00 PM  FACILITATOR(S): Erich Mei LADC; Catie Camilo; Gaye Lake LADC  TOPIC: BEH Pyschoeducation  Number of patients attending the group:  6  Group Length:  1 Hours    Skills Group Therapy Type: Healthy behaviors development    Summary of Group / Topics Discussed:    Balanced lifestyle skills          Group Attendance:  Attended group session    Patient's response to the group topic/interactions:  cooperative with task    Patient appeared to be Attentive and Engaged.         Client specific details:  The patient participated in the afternoon skills group on Nutrition.

## 2023-01-26 NOTE — GROUP NOTE
Group Therapy Documentation    PATIENT'S NAME: Frank Arguello  MRN:   [id number]  :   1985  ACCT. NUMBER: [id number]  DATE OF SERVICE: 23  START TIME:  9:00 AM  END TIME: 11:00 AM  FACILITATOR(S): Justin Porter LADC  TOPIC: BEH Group Therapy  Number of patients attending the group:  8  Group Length:  2 Hours    Group Therapy Type: Recovery strategies    Summary of Group / Topics Discussed:    Recovery Principles, Sober coping skills, Balanced lifestyle, Disease of addiction, Emotions/expression, and Relapse prevention      Group Attendance:  Attended group session    Patient's response to the group topic/interactions:  cooperative with task, discussed personal experience with topic, expressed readiness to alter behaviors, expressed understanding of topic, gave appropriate feedback to peers and listened actively    Patient appeared to be Actively participating, Attentive and Engaged.        Client specific details:  Frank gave appropriate feedback..

## 2023-01-26 NOTE — GROUP NOTE
Group Therapy Documentation    PATIENT'S NAME: Frank Arguello  MRN:   [id number]  :   1985  [id number]  DATE OF SERVICE: 23  START TIME: 12:30 AM  END TIME:  2:30 PM  FACILITATOR(S): Justin Porter LADC; Jamila Beaver  TOPIC: BEH Group Therapy  Number of patients attending the group:  6  Group Length:  2 Hours    Group Therapy Type: Recovery strategies and Emotion processing    Summary of Group / Topics Discussed:    Spiritual Care      Group Attendance:  Attended group session    Patient's response to the group topic/interactions:  cooperative with task, discussed personal experience with topic, expressed readiness to alter behaviors, expressed understanding of topic and listened actively    Patient appeared to be Actively participating, Attentive and Engaged.        Client specific details:  Frank gave appropriate feedback..

## 2023-01-27 NOTE — GROUP NOTE
Group Therapy Documentation    PATIENT'S NAME: Frank Arguello  MRN:   [id number]  :   1985  ACCT. NUMBER: [id number]  DATE OF SERVICE: 23  START TIME:  9:00 AM  END TIME: 11:00 AM  FACILITATOR(S): Justin Porter LADC  TOPIC: BEH Group Therapy  Number of patients attending the group:  6  Group Length:  2 Hours    Group Therapy Type: Recovery strategies    Summary of Group / Topics Discussed:    Recovery Principles, Sober coping skills, Relationship/socialization, Balanced lifestyle, Disease of addiction, Emotions/expression, Relapse prevention, and Self-care activities      Group Attendance:  Attended group session    Patient's response to the group topic/interactions:  cooperative with task, discussed personal experience with topic, expressed readiness to alter behaviors, expressed understanding of topic, gave appropriate feedback to peers and listened actively    Patient appeared to be Actively participating, Attentive and Engaged.        Client specific details:  Frank gave appropriate feedback..

## 2023-01-27 NOTE — GROUP NOTE
Group Therapy Documentation    PATIENT'S NAME: Frank Arguello  MRN:   [id number]  :   1985  ACCT. NUMBER: [id number]  DATE OF SERVICE: 23  START TIME: 12:30 PM  END TIME:  2:30 PM  FACILITATOR(S): Catie Camilo  TOPIC: BEH Group Therapy  Number of patients attending the group:  6    Group Length:  2 Hours    Group Therapy Type: Recovery strategies, Daily living/independence skills, and Health and wellbeing     Summary of Group / Topics Discussed:    Recovery Principles, Relationship/socialization, and Balanced lifestyle      Group Attendance:  Attended group session    Patient's response to the group topic/interactions:  cooperative with task    Patient appeared to be Actively participating, Attentive and Engaged.        Client specific details:  Patient attended group session and was attentive and participative.

## 2023-01-28 NOTE — GROUP NOTE
Group Therapy Documentation    PATIENT'S NAME: Frank Arguello  MRN:   [id number]  :   1985  ACCT. NUMBER: [id number]  DATE OF SERVICE: 23  START TIME: 12:30 PM  END TIME:  2:30 PM  FACILITATOR(S): Heidi Goncalves LADC; Sweta Jesus LADC; Joseph Rodriguez LADC  TOPIC: BEH Group Therapy  Number of patients attending the group: 24  Group Length:  2 Hours    Group Therapy Type: Recovery strategies    Summary of Group / Topics Discussed:    Relationship/socialization      Group Attendance:  Attended group session    Patient's response to the group topic/interactions:  cooperative with task    Patient appeared to be Attentive and Engaged.        Client specific details: Frank was an active participant in workshop on personal strengths and skills.

## 2023-01-28 NOTE — GROUP NOTE
Group Therapy Documentation    PATIENT'S NAME: Frank Arguello  MRN:   [id number]  :   1985  ACCT. NUMBER: [id number]  DATE OF SERVICE: 23  START TIME:  9:00 AM  END TIME: 11:00 AM  FACILITATOR(S): Sweta Jesus [facility]; [facility]  TOPIC: BEH Group Therapy  Number of patients attending the group:  24  Group Length:  2 Hours    Group Therapy Type: Recovery strategies and Emotion processing    Summary of Group / Topics Discussed:    Recovery Principles, Relationship/socialization, and Emotions/expression      Group Attendance:  Attended group session    Patient's response to the group topic/interactions:  cooperative with task and listened actively    Patient appeared to be Actively participating, Attentive and Engaged.        Client specific details:  Frank learned about the importance of honesty in relationships and recovery.

## 2023-01-29 NOTE — GROUP NOTE
Psychoeducation Group Documentation    PATIENT'S NAME: Frank Arguello  MRN:   [id number]  :   1985  ACCT. NUMBER: [id number]  DATE OF SERVICE: 23  START TIME: 12:30 PM  END TIME:  1:30 PM  FACILITATOR(S): Heidi Goncalves LADC; Sweta Jesus LADC  TOPIC: BEH Pyschoeducation  Number of patients attending the group: 24  Group Length:  1 Hours    Skills Group Therapy Type: Emotion regulation skills    Summary of Group / Topics Discussed:    Relationship/social skills and Balanced lifestyle skills          Group Attendance:  Attended group session    Patient's response to the group topic/interactions:  cooperative with task    Patient appeared to be Engaged.         Client specific details: Pt was appropriate and engaged, during  Skills group.

## 2023-01-29 NOTE — GROUP NOTE
Group Therapy Documentation    PATIENT'S NAME: Frank Arguello  MRN:   [id number]  :   1985  ACCT. NUMBER: [id number]  DATE OF SERVICE: 23  START TIME:  8:45 AM  END TIME: 10:30 AM  FACILITATOR(S): Heidi Goncalves LADC; Deirdre Marrero RN; Sweta Jesus LADC  TOPIC: BEH Group Therapy  Number of patients attending the group: 24  Group Length:  2 Hours    Group Therapy Type: Health and wellbeing     Summary of Group / Topics Discussed:    Hepatitis C and Tuberculosis      Group Attendance:  Attended group session    Patient's response to the group topic/interactions:  cooperative with task    Patient appeared to be Attentive and Engaged.        Client specific details: Frank was an active participant in [facility] lecture on Hepatitis C/TB.

## 2023-01-30 NOTE — ADDENDUM NOTE
Encounter addended by: Heidi Goncalves LADC on: 1/30/2023 8:43 AM   Actions taken: Charge Capture section accepted
27.1

## 2023-01-30 NOTE — GROUP NOTE
Group Therapy Documentation    PATIENT'S NAME: Frank Arguello  MRN:   [id number]  :   1985  [id number]  DATE OF SERVICE: 23  START TIME:  9:00 AM  END TIME: 11:00 AM  FACILITATOR(S): Heidi Goncalves LADC  TOPIC: BEH Group Therapy  Number of patients attending the group: 7  Group Length:  2 Hours    Group Therapy Type: Emotion processing    Summary of Group / Topics Discussed:    Recovery Principles, Balanced lifestyle, and Disease of addiction      Group Attendance:  Attended group session    Patient's response to the group topic/interactions:  cooperative with task    Patient appeared to be Actively participating, Attentive and Engaged.        Client specific details: Frank was an active participant in morning group therapy session. He took part in a discussion on aftercare planning and goal setting.

## 2023-01-30 NOTE — PROGRESS NOTES
[facility] Weekly Treatment Plan Review    ATTENDANCE FOR THE FOLLOWING DATE SPAN: 23 TO 23    Weekly Treatment Plan Review     Treatment Plan initiated on: 23.    Dimension1: Acute Intoxication/Withdrawal Potential -   Previous Dimension Ratin  Current Dimension Ratin  Date of Last Use: 23  Any reports of withdrawal symptoms - No      Dimension 2: Biomedical Conditions & Complications -   Previous Dimension Ratin  Current Dimension Ratin  Medical Concerns: None reported  Current Medications & Medication Changes:  Current Outpatient Medications   Medication     acetaminophen (TYLENOL) 325 MG tablet     alum & mag hydroxide-simethicone (MAALOX) 200-200-20 MG/5ML SUSP suspension     benzocaine-menthol (CEPACOL) 15-3.6 MG lozenge     escitalopram (LEXAPRO) 10 MG tablet     gabapentin (NEURONTIN) 400 MG capsule     guaiFENesin (COUGH SYRUP PO)     hydrOXYzine (ATARAX) 25 MG tablet     ibuprofen (ADVIL/MOTRIN) 200 MG tablet     loratadine (CLARITIN) 10 MG tablet     melatonin 3 MG tablet     metoprolol succinate ER (TOPROL XL) 50 MG 24 hr tablet     multivitamin w/minerals (THERA-VIT-M) tablet     nicotine (NICORETTE) 4 MG gum     QUEtiapine (SEROQUEL) 25 MG tablet     senna-docusate (SENOKOT-S/PERICOLACE) 8.6-50 MG tablet     thiamine (B-1) 100 MG tablet     No current facility-administered medications for this encounter.     Facility-Administered Medications Ordered in Other Encounters   Medication     Self Administer Medications: Behavioral Services     Medication Prescriber: See chart  Taking meds as prescribed? Yes  Medication side effects or concerns:  None reported  Outside medical appointments this week (list provider and reason for visit):  None       Dimension 3: Emotional/Behavioral Conditions & Complications -   Previous Dimension Ratin  Current Dimension Ratin  PHQ2:   PHQ-2 (  [employer]) 2023   Q1: Little interest or  "pleasure in doing things 0 0 1 3   Q2: Feeling down, depressed or hopeless 0 1 1 2   PHQ-2 Score 0 1 2 5      GAD2:   ROCHELLE-2 2023   Feeling nervous, anxious, or on edge 2 1   Not being able to stop or control worrying 0 0   ROCHELLE-2 Total Score 2 1     Mental health diagnosis: Anxiety and depression  Date of last SIB: NA  Date of  last SI:  NA  Date of last HI: NA  Behavioral Targets:  Stabilize and maintain mental health.  Risk factors:  Early sobriety, grief/loss, unmanaged mental health symptoms, trauma history  Protective factors:  forward/future oriented thinking, abstinence from substances, adherence with prescribed medication, agreement to use safety plan, living with other people, structured day and access to a variety of clinical interventions     Current MH Assignments:  Understanding Depression and Addiction    Narrative:  Patient continues to work on his awareness and insight regarding how his substance abuse negatively impacts his mental and emotional well being. Patient reports no significant changes in his mood and that his stress level has subsided.  Patient completed his \"Undrstanding Depression and Addiction\" assignment and presented in group. Patient ia working on this \"Grief and Loss\" assignment and will present in group upon completion. Patient reports no suicidal ideation at this time.       Dimension 4: Treatment Acceptance / Resistance -   Previous Dimension Ratin  Current Dimension Ratin  ALAN Diagnosis:  Alcohol Use Disorder   303.90 (F10.20) Severe In a controlled environment  Opioid Use Disorder, Specify if:  In a controlled environment with a severity of:  305.50 (F11.10) Mild  Commitment to tx process/Stage of change-Contemplation  ALAN assignments - First Step      Narrative - Patient is continuing to work on his internal motivation for change. Patient has attended group sessions and lectures on time and offers meaningful feedback to his peers regarding home work " "assignment and topics for group discussion. Patient reports that he is motivated by the [facility] process regarding treatment. Patient completed his \"First Step\" assignment and presented in group. Patient has completed all of his assignments in this dimension.       Dimension 5: Relapse / Continued Problem Potential -   Previous Dimension Ratin  Current Dimension Ratin  Relapses this week - None  Urges to use - YES, List \"3/10\"  UA results -   Recent Results (from the past 168 hour(s))   Asymptomatic COVID-19 Virus (Coronavirus) by PCR Nose    Collection Time: 23  7:57 AM    Specimen: Nose; Swab   Result Value Ref Range    SARS CoV2 PCR Negative Negative   Asymptomatic COVID-19 Virus (Coronavirus) by PCR Nose    Collection Time: 23  7:12 AM    Specimen: Nose; Swab   Result Value Ref Range    SARS CoV2 PCR Negative Negative       Narrative- Patient continues to rate his cravings as a 3, 1-(low)-10-(high). Patient continues to work on gaining insight regarding his triggers, cues and early signs for relapse. Patient reports that he crowley managed his cravings by learning to relax. Patient completed his \"'A Look At Cross Addiction\" and presented in group. Patient is working on his \"Drug Use History\" assignment and will present in group upon completion.      Dimension 6: Recovery Environment -   Previous Dimension Ratin  Current Dimension Ratin  Family Involvement - Patient reports having phone contact with loved ones.  Summarize attendance at family groups and family sessions - NA  Family supportive of treatment?  Yes    Community support group attendance - Patient is attending nightly sober support meetings/lectures.  Recreational activities - Patient has been participating in offered program activities and leisure time with peers.    Narrative -  Patient reports attending all 12 Step meetings this past week in compliance with his treatment plan. Patient will continue to work with his " counselors to develop an aftercare treatment protocol conducive to long term sobriety.     Progress made on transition planning goals: None at this time    Justification for Continued Treatment at this Level of Care:    Patient continues to work on completing treatment plan goals and interventions.   Patient remains a high risk for relapse at this time and would benefit from continued support in developing relapse prevention strategies.   Patient's mental health symptoms are currently impacting his daily functioning and would benefit from continued support.    Treatment coordination activities this week:  None  Need for peer recovery support referral? No    Discharge Planning:  Target Discharge Date/Timeframe: 2/16/23   Med Mgmt Provider/Appt:  TBD   Ind therapy Provider/Appt:  TBD   Family therapy Provider/Appt:  TBD   Other referrals:  TBD      Has vulnerable adult status changed? No    Interdisciplinary Clinical Supervision including: LADC and Mental health professional    Are Treatment Plan goals/objectives effective? Yes  *If no, list changes to treatment plan:    Are the current goals meeting client's needs? Yes  *If no, list the changes to treatment plan.    Client Input / Response: Patient contributed to treatment plan review.      *Client agrees with any changes to the treatment plan: N/A  *Client received copy of changes: N/A  *Client is aware of right to access a treatment plan review: Yes     KEYANNA Pathak, LIC

## 2023-01-30 NOTE — GROUP NOTE
Group Therapy Documentation    PATIENT'S NAME: Frank Arguello  MRN:   [id number]  :   1985  [facility] NUMBER: [id number]  DATE OF SERVICE: 23  START TIME: 12:30 PM  END TIME:  2:30 PM  FACILITATOR(S): Catie Camilo  TOPIC: BEH Group Therapy  Number of patients attending the group:  8    Group Length:  2 Hours    Group Therapy Type: Recovery strategies, Emotion processing, and Daily living/independence skills    Summary of Group / Topics Discussed:    Recovery Principles, Sober coping skills, Balanced lifestyle, and Relapse prevention      Group Attendance:  Attended group session    Patient's response to the group topic/interactions:  cooperative with task    Patient appeared to be Actively participating, Attentive and Engaged.        Client specific details:  Patient attended group session and was attentive and participative.

## 2023-01-31 NOTE — GROUP NOTE
Group Therapy Documentation    PATIENT'S NAME: Frank Arguello  MRN:   [id number]  :   1985  [facility] NUMBER: [id number]  DATE OF SERVICE: 23  START TIME:  3:00 PM  END TIME:  4:00 PM  FACILITATOR(S): Catie Camilo  TOPIC: BEH Group Therapy  Number of patients attending the group:  24    Group Length:  1 Hour    Group Therapy Type: Recovery strategies and Emotion processing    Summary of Group / Topics Discussed:    Recovery Principles, Relationship/socialization, and Relapse prevention      Group Attendance:  Attended group session    Patient's response to the group topic/interactions:  cooperative with task    Patient appeared to be Actively participating, Attentive and Engaged.        Client specific details:  Patient attended group lecture and was attentive and participative.

## 2023-01-31 NOTE — GROUP NOTE
Group Therapy Documentation    PATIENT'S NAME: Frank Arguello  MRN:   [id number]  :   1985  [facility] NUMBER: [id number]  DATE OF SERVICE: 23  START TIME: 12:30 PM  END TIME:  2:30 PM  FACILITATOR(S): Catie Camilo  TOPIC: BEH Group Therapy  Number of patients attending the group:  8    Group Length:  2 Hours    Group Therapy Type: Recovery strategies, Emotion processing, and Daily living/independence skills    Summary of Group / Topics Discussed:    Recovery Principles, Sober coping skills, Relationship/socialization, and Relapse prevention      Group Attendance:  Attended group session    Patient's response to the group topic/interactions:  cooperative with task    Patient appeared to be Actively participating, Attentive and Engaged.        Client specific details:  Patient attended group session and was attentive and participative.

## 2023-01-31 NOTE — GROUP NOTE
Group Therapy Documentation    PATIENT'S NAME: Frank Arguello  MRN:   [id number]  :   1985  [facility] NUMBER: [id number]  DATE OF SERVICE: 23  START TIME:  9:00 AM  END TIME: 11:00 AM  FACILITATOR(S): Heidi Goncalves LADC  TOPIC: BEH Group Therapy  Number of patients attending the group: 8  Group Length:  2 Hours    Group Therapy Type: Emotion processing    Summary of Group / Topics Discussed:    Disease of addiction and Emotions/expression      Group Attendance:  Attended group session    Patient's response to the group topic/interactions:  cooperative with task    Patient appeared to be Actively participating, Attentive and Engaged.        Client specific details: Frank was an active participant in morning group therapy session. He provided supportive feedback to his peers who presented treatment plan assignments.

## 2023-02-01 NOTE — GROUP NOTE
Group Therapy Documentation    PATIENT'S NAME: Frank Arguello  MRN:   [id number]  :   1985  [facility]. NUMBER: [id number]  DATE OF SERVICE: 23  START TIME:  9:40 AM  END TIME: 11:30 AM  FACILITATOR(S): Justin Porter LADC  TOPIC: BEH Group Therapy  Number of patients attending the group:  8  Group Length:  2 Hours    Group Therapy Type: Recovery strategies and Emotion processing    Summary of Group / Topics Discussed:    Recovery Principles, Sober coping skills, Balanced lifestyle, Disease of addiction, and Relapse prevention      Group Attendance:  Attended group session    Patient's response to the group topic/interactions:  cooperative with task, discussed personal experience with topic, expressed readiness to alter behaviors, expressed understanding of topic, gave appropriate feedback to peers and listened actively    Patient appeared to be Actively participating, Attentive and Engaged.        Client specific details:  Frank gave appropriate feedback..

## 2023-02-01 NOTE — GROUP NOTE
Group Therapy Documentation    PATIENT'S NAME: Frank Arguello  MRN:   [id number]  :   1985  [facility]. NUMBER: [id number]  DATE OF SERVICE: 23  START TIME: 12:30 PM  END TIME:  2:30 PM  FACILITATOR(S): Catie Camilo  TOPIC: BEH Group Therapy  Number of patients attending the group:  7    Group Length:  2 Hours    Group Therapy Type: Recovery strategies, Emotion processing, and Health and wellbeing     Summary of Group / Topics Discussed:    Recovery Principles, Sober coping skills, Relationship/socialization, and Relapse prevention      Group Attendance:  Attended group session    Patient's response to the group topic/interactions:  cooperative with task    Patient appeared to be Actively participating, Attentive and Engaged.        Client specific details:  Patient attended group session and was attentive and participative.

## 2023-02-01 NOTE — GROUP NOTE
Psychoeducation Group Documentation    PATIENT'S NAME: Frank Arguello  MRN:   [id number]  :   1985  [facility] NUMBER: [id number]  DATE OF SERVICE: 23  START TIME:  8:30 AM  END TIME:  9:30 AM  FACILITATOR(S): Justin Porter LADC; Norman Romero, PhD LP; Shaye Prather LADC  TOPIC: BEH Pyschoeducation  Number of patients attending the group:  7  Group Length:  1 Hours    Skills Group Therapy Type: Healthy behaviors development    Summary of Group / Topics Discussed:    Balanced lifestyle skills          Group Attendance:  Attended group session    Patient's response to the group topic/interactions:  cooperative with task    Patient appeared to be Attentive.         Client specific details:  Frank gave appropriate feedback..

## 2023-02-02 NOTE — GROUP NOTE
Psychoeducation Group Documentation    PATIENT'S NAME: Frank Arguello  MRN:   [id number]  :   1985  [facility]. NUMBER: [id number]  DATE OF SERVICE: 23  START TIME:  3:00 PM  END TIME:  4:00 PM  FACILITATOR(S): Justin Porter LADC; Erich Mei LADC  TOPIC: BEH Pyschoeducation  Number of patients attending the group:  7  Group Length:  1 Hours    Skills Group Therapy Type: Recovery skills    Summary of Group / Topics Discussed:    Balanced lifestyle skills          Group Attendance:  Attended group session    Patient's response to the group topic/interactions:  cooperative with task and expressed readiness to alter behaviors    Patient appeared to be Actively participating and Attentive.         Client specific details:  Frank gave appropriate feedback..

## 2023-02-02 NOTE — GROUP NOTE
"Group Therapy Documentation    PATIENT'S NAME: Frank Arguello  MRN:   [id number]  :   1985  [facility]. NUMBER: [id number]  DATE OF SERVICE: 23  START TIME:  9:00 AM  END TIME: 11:00 AM  FACILITATOR(S): Justin Porter LADC  TOPIC: BEH Group Therapy  Number of patients attending the group:  7  Group Length:  2 Hours    Group Therapy Type: Recovery strategies and Emotion processing    Summary of Group / Topics Discussed:    Recovery Principles, Sober coping skills, Balanced lifestyle, Disease of addiction, Emotions/expression, Relapse prevention, and Self-care activities      Group Attendance:  Attended group session    Patient's response to the group topic/interactions:  cooperative with task, discussed personal experience with topic, expressed readiness to alter behaviors, expressed understanding of topic, gave appropriate feedback to peers and listened actively    Patient appeared to be Actively participating, Attentive and Engaged.        Client specific details:  Frank gave appropriate feedback..He presented his \"Cross Addiction\" assignment.      "

## 2023-02-02 NOTE — GROUP NOTE
Group Therapy Documentation    PATIENT'S NAME: Frank Arguello  MRN:   [id number]  :   1985  [facility]. NUMBER: [id number]  DATE OF SERVICE: 23  START TIME: 12:30 PM  END TIME:  2:30 PM  FACILITATOR(S): Justin Porter LADC; Jamila Beaver  TOPIC: BEH Group Therapy  Number of patients attending the group:  7  Group Length:  2 Hours    Group Therapy Type: Recovery strategies and Emotion processing    Summary of Group / Topics Discussed:    Recovery Principles and Spiritual Care      Group Attendance:  Attended group session    Patient's response to the group topic/interactions:  cooperative with task, discussed personal experience with topic and expressed readiness to alter behaviors    Patient appeared to be Actively participating, Attentive and Engaged.        Client specific details:  Frank gave appropriate feedback..

## 2023-02-03 NOTE — GROUP NOTE
Group Therapy Documentation    PATIENT'S NAME: Frank Arguello  MRN:   [id number]  :   1985  [facility] NUMBER: [id number]  DATE OF SERVICE: 23  START TIME:  9:00 AM  END TIME: 11:00 AM  FACILITATOR(S): Justin Porter LADC  TOPIC: BEH Group Therapy  Number of patients attending the group:  8  Group Length:  2 Hours    Group Therapy Type: Recovery strategies and Emotion processing    Summary of Group / Topics Discussed:    Recovery Principles, Sober coping skills, Balanced lifestyle, Disease of addiction, Emotions/expression, Relapse prevention, and Self-care activities      Group Attendance:  Attended group session    Patient's response to the group topic/interactions:  cooperative with task, discussed personal experience with topic, expressed readiness to alter behaviors, expressed understanding of topic, gave appropriate feedback to peers and listened actively    Patient appeared to be Actively participating, Attentive and Engaged.        Client specific details:  Frank gave appropriate feedback..

## 2023-02-03 NOTE — GROUP NOTE
Psychoeducation Group Documentation    PATIENT'S NAME: Frank Arguello  MRN:   [id number]  :   1985  [facility]. NUMBER: [id number]  DATE OF SERVICE: 23  START TIME: 12:30 PM  END TIME:  2:30 PM  FACILITATOR(S): Justin Porter [facility]; Erich Mei [facility]; Sweta Jesus [facility]  TOPIC: BEH Pyschoeducation  Number of patients attending the group:  8  Group Length:  2 Hours    Skills Group Therapy Type: Recovery skills and Emotion regulation skills    Summary of Group / Topics Discussed:    Relationship/social skills and Balanced lifestyle skills          Group Attendance:  Attended group session    Patient's response to the group topic/interactions:  cooperative with task and listened actively    Patient appeared to be Actively participating and Attentive.         Client specific details:  Frank gave appropriate feedback..

## 2023-02-04 NOTE — GROUP NOTE
Group Therapy Documentation    PATIENT'S NAME: Frank Arguello  MRN:   [id number]  [facility]   1985  [id number]  DATE OF SERVICE: 23  START TIME:  9:30 AM  END TIME: 11:00 AM  FACILITATOR(S): Catie Camilo  TOPIC: BEH Group Therapy  Number of patients attending the group:  27    Group Length:  1.5 Hours    Group Therapy Type: Recovery strategies, Emotion processing, and Daily living/independence skills    Summary of Group / Topics Discussed:    Relapse prevention      Group Attendance:  Attended group session    Patient's response to the group topic/interactions:  cooperative with task    Patient appeared to be Actively participating, Attentive and Engaged.        Client specific details:  Patient attended lecture and was attentive and participative.

## 2023-02-04 NOTE — GROUP NOTE
Group Therapy Documentation    PATIENT'S NAME: Frank Arguello  MRN:   [id number]  :   1985  [id number]  DATE OF SERVICE: 23  START TIME: 12:30 PM  END TIME:  2:30 PM  FACILITATOR(S): Maria Del Carmen Ortez LADC; Catie Camilo  TOPIC: BEH Group Therapy  Number of patients attending the group:  27  Group Length:  2 Hours    Group Therapy Type: Recovery strategies    Summary of Group / Topics Discussed:    Sober coping skills, Disease of addiction, and Relapse prevention    Patients participated in a relapse prevention skills workshop, gaining knowledge on how their behaviors in active use can show up as thought patterns in early recovery. Patients learned how to identify their addictive thought patterns and practiced coping strategies to reframe these thoughts to avoid return to use of substances. Each patient had an opportunity to process the information, ask questions of the facilitator, and provide constructive feedback to peers who shared.      Group Attendance:  Attended group session    Patient's response to the group topic/interactions:  cooperative with task    Patient appeared to be Actively participating, Attentive and Engaged.        Client specific details:  Patient participated in relapse prevention workshop, demonstrating active listening skills, engaging in the skills activity, and providing appropriate feedback to peers who shared.

## 2023-02-05 NOTE — PROGRESS NOTES
[facility] Weekly Treatment Plan Review    ATTENDANCE FOR THE FOLLOWING DATE SPAN: 23 TO 23    Weekly Treatment Plan Review     Treatment Plan initiated on: 23.    Dimension1: Acute Intoxication/Withdrawal Potential -   Previous Dimension Ratin  Current Dimension Ratin  Date of Last Use: 23  Any reports of withdrawal symptoms - No      Dimension 2: Biomedical Conditions & Complications -   Previous Dimension Ratin  Current Dimension Ratin  Medical Concerns: None reported  Current Medications & Medication Changes:  Current Outpatient Medications   Medication     acetaminophen (TYLENOL) 325 MG tablet     alum & mag hydroxide-simethicone (MAALOX) 200-200-20 MG/5ML SUSP suspension     benzocaine-menthol (CEPACOL) 15-3.6 MG lozenge     escitalopram (LEXAPRO) 10 MG tablet     gabapentin (NEURONTIN) 400 MG capsule     guaiFENesin (COUGH SYRUP PO)     hydrOXYzine (ATARAX) 25 MG tablet     ibuprofen (ADVIL/MOTRIN) 200 MG tablet     loratadine (CLARITIN) 10 MG tablet     melatonin 3 MG tablet     metoprolol succinate ER (TOPROL XL) 50 MG 24 hr tablet     multivitamin w/minerals (THERA-VIT-M) tablet     nicotine (NICORETTE) 4 MG gum     nicotine polacrilex (NICORETTE) 4 MG gum     QUEtiapine (SEROQUEL) 25 MG tablet     senna-docusate (SENOKOT-S/PERICOLACE) 8.6-50 MG tablet     thiamine (B-1) 100 MG tablet     No current facility-administered medications for this encounter.     Facility-Administered Medications Ordered in Other Encounters   Medication     Self Administer Medications: Behavioral Services     Medication Prescriber: See chart  Taking meds as prescribed? Yes  Medication side effects or concerns:  None reported  Outside medical appointments this week (list provider and reason for visit):  None       Dimension 3: Emotional/Behavioral Conditions & Complications -   Previous Dimension Ratin  Current Dimension Ratin  PHQ2:   PHQ-2 (  [employer]) 2/3/2023 2023  "2023   Q1: Little interest or pleasure in doing things 0 0 0 1 3   Q2: Feeling down, depressed or hopeless 0 0 1 1 2   PHQ-2 Score 0 0 1 2 5      GAD2:   ROCHELLE-2 2023 2023 2/3/2023   Feeling nervous, anxious, or on edge 2 1 1   Not being able to stop or control worrying 0 0 0   ROCHELLE-2 Total Score 2 1 1     Mental health diagnosis: Anxiety and depression  Date of last SIB: NA  Date of  last SI:  NA  Date of last HI: NA  Behavioral Targets:  Stabilize and maintain mental health.  Risk factors:  Early sobriety, grief/loss, unmanaged mental health symptoms, trauma history  Protective factors:  forward/future oriented thinking, abstinence from substances, adherence with prescribed medication, agreement to use safety plan, living with other people, structured day and access to a variety of clinical interventions     Current MH Assignments:  Understanding Depression and Addiction    Narrative:  Patient continues to work awareness regarding how his substance abuse negatively impacts his mental and emotional well being. Patient completed his \"Understanding Depression and Addiction\" assignment and presented in group. Patient also completed his 'Grief and Loss\" assignment and presented in group. Patient reports that his stress level has subsided due to his working the program. Patient has completed all of his assignments in this dimension. Patient reports no suicidal ideation at this time.       Dimension 4: Treatment Acceptance / Resistance -   Previous Dimension Ratin  Current Dimension Ratin  ALAN Diagnosis:  Alcohol Use Disorder   303.90 (F10.20) Severe In a controlled environment  Opioid Use Disorder, Specify if:  In a controlled environment with a severity of:  305.50 (F11.10) Mild  Commitment to tx process/Stage of change-Contemplation  ALAN assignments - First Step      Narrative - Patient continues to work on his internal motivation for change. Patient has attended group sessions " "and lectures on time and offers meaningful feedback to his peers regarding home work assignment and topics for group discussion. Patient reports that he is motivated by interacting with his peers and buying into the [facility] treatment process. Patient has completed all his assignments in this dimension.       Dimension 5: Relapse / Continued Problem Potential -   Previous Dimension Ratin  Current Dimension Ratin  Relapses this week - None  Urges to use - YES, List \"3/10\"  UA results -   No results found for this or any previous visit (from the past 168 hour(s)).    Narrative- Patient reports no cravings this week. Patient continues to work on gaining and understanding regarding his early warning signs for relapse. Patient completed his \"A Look At Cross Addiction\" assignment and presented in group. Patient is working on his \"Drug Use History\" assignment and will present in group upon completion. Patient attended a \"Relapse Prevention Workshop\" this past weekend and was attentive and participative.    Dimension 6: Recovery Environment -   Previous Dimension Ratin  Current Dimension Ratin  Family Involvement - Patient reports having phone contact with loved ones.  Summarize attendance at family groups and family sessions - NA  Family supportive of treatment?  Yes    Community support group attendance - Patient is attending nightly sober support meetings/lectures.  Recreational activities - Patient has been participating in offered program activities and leisure time with peers.    Narrative -  Patient reports attending all 12 Step meetings this past week in compliance with his treatment plan. Patient will continue to work with his counselors to develop an aftercare treatment protocol conducive to long term sobriety. Patient reports that he would like to return home, go to therapy, attend AA meetings and meet with a sponsor.    Progress made on transition planning goals: None at this " time    Justification for Continued Treatment at this Level of Care:    Patient continues to work on completing treatment plan goals and interventions.   Patient remains a high risk for relapse at this time and would benefit from continued support in developing relapse prevention strategies.   Patient's mental health symptoms are currently impacting his daily functioning and would benefit from continued support.    Treatment coordination activities this week:  None  Need for peer recovery support referral? No    Discharge Planning:  Target Discharge Date/Timeframe: 2/16/23   Med Mgmt Provider/Appt:  TBUMANG   Ind therapy Provider/Appt:  TBD   Family therapy Provider/Appt:  TBUMANG   Other referrals:  TBD      Has vulnerable adult status changed? No    Interdisciplinary Clinical Supervision including: LADWADE and Mental health professional    Are Treatment Plan goals/objectives effective? Yes  *If no, list changes to treatment plan:    Are the current goals meeting client's needs? Yes  *If no, list the changes to treatment plan.    Client Input / Response: Patient contributed to treatment plan review.      *Client agrees with any changes to the treatment plan: N/A  *Client received copy of changes: N/A  *Client is aware of right to access a treatment plan review: Yes     ABRIL Tucker

## 2023-02-05 NOTE — GROUP NOTE
Psychoeducation Group Documentation    PATIENT'S NAME: Frank Arguello  MRN:   [id number]  :   1985  ACCT. NUMBER: [id number]  DATE OF SERVICE: 23  START TIME:  9:00 AM  END TIME: 11:00 AM  FACILITATOR(S): Donna Miles RN; Gaye Lake LADC  TOPIC: BEH Pyschoeducation  Number of patients attending the group:  8  Group Length:  2 Hours    Skills Group Therapy Type: Lecture    Summary of Group / Topics Discussed:    Balanced lifestyle skills and Relapse prevention skills          Group Attendance:  Attended group session    Patient's response to the group topic/interactions:  cooperative with task    Patient appeared to be Attentive.         Client specific details:  gissell Marie Lecture presented by: Nursing staff on nutrition, medication management, and substance and pregnacy. Patient engaged in Q&A after the lecture was presented.

## 2023-02-05 NOTE — GROUP NOTE
Group Therapy Documentation    PATIENT'S NAME: Frank Arguello  MRN:   [id number]  :   1985  ACCT. NUMBER: [id number]  DATE OF SERVICE: 23  START TIME: 12:30 PM  END TIME:  1:30 PM  FACILITATOR(S): Catie Camilo  TOPIC: BEH Group Therapy  Number of patients attending the group:  27    Group Length:  1 Hour    Group Therapy Type: Emotion processing    Summary of Group / Topics Discussed:    Relapse prevention      Group Attendance:  Attended group session    Patient's response to the group topic/interactions:  cooperative with task    Patient appeared to be Actively participating, Attentive and Engaged.        Client specific details:  Patient attended group lecture and attentive and participative.

## 2023-02-06 NOTE — PROGRESS NOTES
Frank ALMONTE Patient approached writer and stated that he want to discharge himself. Patient stated that he has to deal some personal stuff outside treatment. Writer encouraged patient to wait for his counselors before leaving, patient refused.  Writer asked if he is sure about his decision he stated yes, writer asked if he has a safe place to go patient stated yes, patient also stated that his ride is coming to pick him up. Writer and staff prepared his medications. Patient left AMA @ 1815.

## 2023-02-06 NOTE — PROGRESS NOTES
[address] [place] [address]          Frank Arguello, 1985, was admitted for evaluation/treatment of chemical dependency at [facility].  This person took part in these program(s):    ______ The Inpatient Program   ______ The Outpatient Program   ______ The [facility] Program   ______ [facility] Outpatient       Date admitted: 1/19/23  Date discharged: 2/5/23    Type of discharge:   ______ Satisfactory - completed evaluation / treatment   ______ Discharged without completing   ______ Behavioral discharge   ______ Transferred to another chemical dependency program   ______ Transferred to another type of service   __X____ Left against medical advice (AMA) / Eloped       Comments:       Counselor: ABRIL Hanson                       Date: 2/6/2023             Time: 6:43 AM

## 2023-02-06 NOTE — ADDENDUM NOTE
Encounter addended by: Agatha Dubose on: 2/6/2023 12:23 AM   Actions taken: Charge Capture section accepted

## 2023-02-06 NOTE — ADDENDUM NOTE
Encounter addended by: Justin Porter LADC on: 2/6/2023 6:46 AM   Actions taken: Clinical Note Signed

## 2023-02-06 NOTE — PROGRESS NOTES
Visit Date: 02/05/2023    DISCHARGE SUMMARY    EVALUATION COUNSELOR:  ABRIL Padilla.    TREATMENT COUNSELORS:  DIYA Laboy, ABRIL; Heidi Goncalves PsyD, ARIEL.    REFERRAL SOURCE:  [facility].    PROGRAM:  [facility].    ADMISSION DATE:  01/19/2023.    LAST SESSION DATE:  02/05/2023.     ADMISSION DIAGNOSES:   Alcohol use disorder, severe, 303.90/F10.20.    DISCHARGE DIAGNOSES:  Alcohol use disorder, severe, 303.90/F10.20.    DISCHARGE STATUS:  Patient left against staff advice on Sunday 02/05/2023.    LAST USE DATE AS CLIENT REPORTED:  01/16/2023     DAYS OF TREATMENT COMPLETED:  Lodging +18 days.    HISTORY OF PRESENTING INFORMATION:  The patient was admitted through [facility] where he detoxed from alcoholism.  The patient participated in the [facility] program for 18 days.  The patient received a phone call from his girlfriend on Sunday evening and decided to leave treatment as soon as possible.  The patient requested his belongings from staff.  Staff tried to ask him to wait until his therapists and counselors met with him on Monday.  The patient refused and said he had a safe place to go.  The patient was discharged AMA.    SERVICES PROVIDED:  Services included assessment, treatment planning, education regarding chemical dependency, individual and group therapy, spiritual care, counseling and workshops dealing with the issues of depression, anxiety, relapse prevention and relationships.    DIMENSION 1:  Acute intoxication withdrawal.  Admission risk factor 0, discharge risk factor 0.  The patient stated that his last use was 01/16/2023.    DIMENSION 2:  Biomedical:  Admission risk factor 1, discharge risk factor 1.  The patient reported slight pain in his left arm.  None of these issues interfered with the patient participating in the treatment  program.    DIMENSION 3:  Emotional, behavioral.  Admission risk factor 2, discharge risk factor 2.  Upon entering treatment, the patient reported feelings of depression and anxiety that were compounded by his addiction.  The patient was asked to read Understanding Depression and Addiction and share a 2-page reflection paper.  The patient was able to accomplish his goal of understanding the relationship between his addiction and his mental health issues.  The patient had some grief and loss issues over the overdose death of his brother.  The patient completed the Grief and Loss packet, which allowed him to accomplish his goal of learning skills to express feelings in a healthy way.  The patient stated that at no time during the treatment program did he experience any suicidal ideation.  The patient did complete a safety plan.    DIMENSION 4:  Readiness to Change.  Admission risk factor 0, discharge risk factor 3.  The patient entered treatment, stating that he was motivated to change his lifestyle once and for all after many years of ongoing use.  The patient received a phone call from his girlfriend on Sunday and left the program AMA.  The patient would not listen to any staff about waiting to discuss.  The patient was adamant that he had a safe place to go and would be leaving treatment.    DIMENSION 5:  Relapse, continued use potential.  Admission risk factor 4, discharge risk factor 4.  The patient continues to be a high risk for relapse.    DIMENSION 6:  Recovery environment.  Admission risk factor 2, discharge risk factor 3.  The patient stated that his girlfriend does use.  The patient did state that he has codependency issues with this particular female.  It did not seem like a conducive relationship for long-term recovery, but the patient was adamant that he would be staying in this relationship.    STRENGTHS:  The patient participated in group process.  Patient did his assignments in a timely  basis.    PROGNOSIS:  Unfavorable since the patient left AMA.    LIVING ARRANGEMENTS AT DISCHARGE:  The patient will be returning to his home and his significant other.    CONTINUING CARE RECOMMENDATIONS AND REFERRALS:     1.  The patient needs to abstain from all mood-altering chemicals.  2.  The patient needs to participate and complete a chemical dependency treatment program.  3.  Patient needs to attend 12-step meetings.  4.  The patient needs to obtain a sponsor.  5.  The patient needs to stay medication compliant.    This information has been disclosed to you from records protected by Federal confidentiality rules (42 CFR part 2). The Federal rules prohibit you from making any further disclosure of this information unless further disclosure is expressly permitted by the written consent of the person to whom it pertains or as otherwise permitted by 42 CFR part 2. A general authorization for the release of medical or other information is NOT sufficient for this purpose. The Federal rules restrict any use of the information to criminally investigate or prosecute any alcohol or drug abuse patient.    DIYA Hanson, [facility]        D: 2023   T: 2023   MT: PK    Name:     MUKUND TEEAntonina  MRN:      [id number]        Account:    [id number]   :      1985           Visit Date: 2023     Document: [id number]